# Patient Record
Sex: FEMALE | Race: WHITE | Employment: OTHER | URBAN - METROPOLITAN AREA
[De-identification: names, ages, dates, MRNs, and addresses within clinical notes are randomized per-mention and may not be internally consistent; named-entity substitution may affect disease eponyms.]

---

## 2017-06-28 PROBLEM — R51.9 INTRACTABLE HEADACHE: Status: ACTIVE | Noted: 2017-06-28

## 2020-01-16 ENCOUNTER — APPOINTMENT (OUTPATIENT)
Dept: CT IMAGING | Age: 75
DRG: 386 | End: 2020-01-16
Payer: MEDICARE

## 2020-01-16 ENCOUNTER — HOSPITAL ENCOUNTER (INPATIENT)
Age: 75
LOS: 9 days | Discharge: HOME | DRG: 386 | End: 2020-01-25
Attending: EMERGENCY MEDICINE | Admitting: INTERNAL MEDICINE
Payer: MEDICARE

## 2020-01-16 ENCOUNTER — APPOINTMENT (OUTPATIENT)
Dept: GENERAL RADIOLOGY | Age: 75
DRG: 386 | End: 2020-01-16
Payer: MEDICARE

## 2020-01-16 PROBLEM — R41.82 CHANGE IN MENTAL STATUS: Status: ACTIVE | Noted: 2020-01-16

## 2020-01-16 LAB
A/G RATIO: 0.7 (ref 1.1–2.2)
ALBUMIN SERPL-MCNC: 3.6 G/DL (ref 3.4–5)
ALP BLD-CCNC: 124 U/L (ref 40–129)
ALT SERPL-CCNC: 10 U/L (ref 10–40)
ANION GAP SERPL CALCULATED.3IONS-SCNC: 12 MMOL/L (ref 3–16)
ANISOCYTOSIS: ABNORMAL
AST SERPL-CCNC: 15 U/L (ref 15–37)
BASOPHILS ABSOLUTE: 0 K/UL (ref 0–0.2)
BASOPHILS RELATIVE PERCENT: 0.4 %
BILIRUB SERPL-MCNC: 0.3 MG/DL (ref 0–1)
BILIRUBIN URINE: NEGATIVE
BLOOD, URINE: NEGATIVE
BUN BLDV-MCNC: 17 MG/DL (ref 7–20)
CALCIUM SERPL-MCNC: 9.3 MG/DL (ref 8.3–10.6)
CHLORIDE BLD-SCNC: 101 MMOL/L (ref 99–110)
CLARITY: ABNORMAL
CO2: 22 MMOL/L (ref 21–32)
COLOR: YELLOW
COMMENT UA: ABNORMAL
CREAT SERPL-MCNC: 0.8 MG/DL (ref 0.6–1.2)
EKG ATRIAL RATE: 92 BPM
EKG DIAGNOSIS: NORMAL
EKG P AXIS: 31 DEGREES
EKG P-R INTERVAL: 146 MS
EKG Q-T INTERVAL: 344 MS
EKG QRS DURATION: 80 MS
EKG QTC CALCULATION (BAZETT): 425 MS
EKG R AXIS: 19 DEGREES
EKG T AXIS: 4 DEGREES
EKG VENTRICULAR RATE: 92 BPM
EOSINOPHILS ABSOLUTE: 0.1 K/UL (ref 0–0.6)
EOSINOPHILS RELATIVE PERCENT: 0.9 %
EPITHELIAL CELLS, UA: 4 /HPF (ref 0–5)
GFR AFRICAN AMERICAN: >60
GFR NON-AFRICAN AMERICAN: >60
GLOBULIN: 4.9 G/DL
GLUCOSE BLD-MCNC: 93 MG/DL (ref 70–99)
GLUCOSE URINE: NEGATIVE MG/DL
HCT VFR BLD CALC: 33.3 % (ref 36–48)
HEMATOLOGY PATH CONSULT: YES
HEMOGLOBIN: 10.4 G/DL (ref 12–16)
HYALINE CASTS: 11 /LPF (ref 0–8)
HYPOCHROMIA: ABNORMAL
KETONES, URINE: NEGATIVE MG/DL
LACTIC ACID: 1.4 MMOL/L (ref 0.4–2)
LEUKOCYTE ESTERASE, URINE: ABNORMAL
LYMPHOCYTES ABSOLUTE: 1.2 K/UL (ref 1–5.1)
LYMPHOCYTES RELATIVE PERCENT: 10.9 %
MCH RBC QN AUTO: 21.3 PG (ref 26–34)
MCHC RBC AUTO-ENTMCNC: 31.2 G/DL (ref 31–36)
MCV RBC AUTO: 68.2 FL (ref 80–100)
MICROCYTES: ABNORMAL
MICROSCOPIC EXAMINATION: YES
MONOCYTES ABSOLUTE: 0.7 K/UL (ref 0–1.3)
MONOCYTES RELATIVE PERCENT: 6.8 %
MUCUS: ABNORMAL /LPF
NEUTROPHILS ABSOLUTE: 8.6 K/UL (ref 1.7–7.7)
NEUTROPHILS RELATIVE PERCENT: 81 %
NITRITE, URINE: NEGATIVE
OCCULT BLOOD DIAGNOSTIC: ABNORMAL
OVALOCYTES: ABNORMAL
PDW BLD-RTO: 25.7 % (ref 12.4–15.4)
PH UA: 7 (ref 5–8)
PLATELET # BLD: 376 K/UL (ref 135–450)
PMV BLD AUTO: 8.1 FL (ref 5–10.5)
POTASSIUM REFLEX MAGNESIUM: 4.1 MMOL/L (ref 3.5–5.1)
PRO-BNP: 268 PG/ML (ref 0–124)
PROTEIN UA: ABNORMAL MG/DL
RBC # BLD: 4.88 M/UL (ref 4–5.2)
RBC UA: 3 /HPF (ref 0–4)
RENAL EPITHELIAL, UA: ABNORMAL /HPF
SCHISTOCYTES: ABNORMAL
SODIUM BLD-SCNC: 135 MMOL/L (ref 136–145)
SPECIFIC GRAVITY UA: 1.02 (ref 1–1.03)
TOTAL PROTEIN: 8.5 G/DL (ref 6.4–8.2)
TROPONIN: <0.01 NG/ML
URINE REFLEX TO CULTURE: YES
URINE TYPE: ABNORMAL
UROBILINOGEN, URINE: 0.2 E.U./DL
WBC # BLD: 10.7 K/UL (ref 4–11)
WBC UA: 6 /HPF (ref 0–5)

## 2020-01-16 PROCEDURE — 93005 ELECTROCARDIOGRAM TRACING: CPT | Performed by: EMERGENCY MEDICINE

## 2020-01-16 PROCEDURE — 85025 COMPLETE CBC W/AUTO DIFF WBC: CPT

## 2020-01-16 PROCEDURE — 6370000000 HC RX 637 (ALT 250 FOR IP): Performed by: PHYSICIAN ASSISTANT

## 2020-01-16 PROCEDURE — 80053 COMPREHEN METABOLIC PANEL: CPT

## 2020-01-16 PROCEDURE — 81001 URINALYSIS AUTO W/SCOPE: CPT

## 2020-01-16 PROCEDURE — 6360000002 HC RX W HCPCS: Performed by: HOSPITALIST

## 2020-01-16 PROCEDURE — 87086 URINE CULTURE/COLONY COUNT: CPT

## 2020-01-16 PROCEDURE — 6370000000 HC RX 637 (ALT 250 FOR IP): Performed by: HOSPITALIST

## 2020-01-16 PROCEDURE — G0328 FECAL BLOOD SCRN IMMUNOASSAY: HCPCS

## 2020-01-16 PROCEDURE — 2580000003 HC RX 258: Performed by: HOSPITALIST

## 2020-01-16 PROCEDURE — 72125 CT NECK SPINE W/O DYE: CPT

## 2020-01-16 PROCEDURE — 1200000000 HC SEMI PRIVATE

## 2020-01-16 PROCEDURE — 83880 ASSAY OF NATRIURETIC PEPTIDE: CPT

## 2020-01-16 PROCEDURE — 93010 ELECTROCARDIOGRAM REPORT: CPT | Performed by: INTERNAL MEDICINE

## 2020-01-16 PROCEDURE — 70450 CT HEAD/BRAIN W/O DYE: CPT

## 2020-01-16 PROCEDURE — 84484 ASSAY OF TROPONIN QUANT: CPT

## 2020-01-16 PROCEDURE — 83605 ASSAY OF LACTIC ACID: CPT

## 2020-01-16 PROCEDURE — 99285 EMERGENCY DEPT VISIT HI MDM: CPT

## 2020-01-16 PROCEDURE — 71046 X-RAY EXAM CHEST 2 VIEWS: CPT

## 2020-01-16 RX ORDER — ACETAMINOPHEN 325 MG/1
650 TABLET ORAL ONCE
Status: COMPLETED | OUTPATIENT
Start: 2020-01-16 | End: 2020-01-16

## 2020-01-16 RX ORDER — POTASSIUM CHLORIDE 20 MEQ/1
40 TABLET, EXTENDED RELEASE ORAL PRN
Status: DISCONTINUED | OUTPATIENT
Start: 2020-01-16 | End: 2020-01-25 | Stop reason: HOSPADM

## 2020-01-16 RX ORDER — SODIUM CHLORIDE 0.9 % (FLUSH) 0.9 %
10 SYRINGE (ML) INJECTION PRN
Status: DISCONTINUED | OUTPATIENT
Start: 2020-01-16 | End: 2020-01-19

## 2020-01-16 RX ORDER — METOPROLOL SUCCINATE 25 MG/1
25 TABLET, EXTENDED RELEASE ORAL DAILY
Status: DISCONTINUED | OUTPATIENT
Start: 2020-01-16 | End: 2020-01-25 | Stop reason: HOSPADM

## 2020-01-16 RX ORDER — ASPIRIN 81 MG/1
81 TABLET, CHEWABLE ORAL DAILY
Status: DISCONTINUED | OUTPATIENT
Start: 2020-01-17 | End: 2020-01-17

## 2020-01-16 RX ORDER — SODIUM CHLORIDE 0.9 % (FLUSH) 0.9 %
10 SYRINGE (ML) INJECTION EVERY 12 HOURS SCHEDULED
Status: DISCONTINUED | OUTPATIENT
Start: 2020-01-16 | End: 2020-01-19

## 2020-01-16 RX ORDER — CLOPIDOGREL BISULFATE 75 MG/1
75 TABLET ORAL DAILY
Status: DISCONTINUED | OUTPATIENT
Start: 2020-01-17 | End: 2020-01-18

## 2020-01-16 RX ORDER — SODIUM CHLORIDE 9 MG/ML
INJECTION, SOLUTION INTRAVENOUS CONTINUOUS
Status: DISCONTINUED | OUTPATIENT
Start: 2020-01-16 | End: 2020-01-23

## 2020-01-16 RX ORDER — FAMOTIDINE 20 MG/1
20 TABLET, FILM COATED ORAL 2 TIMES DAILY
Status: DISCONTINUED | OUTPATIENT
Start: 2020-01-16 | End: 2020-01-17

## 2020-01-16 RX ORDER — LISINOPRIL 20 MG/1
20 TABLET ORAL DAILY
Status: DISCONTINUED | OUTPATIENT
Start: 2020-01-16 | End: 2020-01-25 | Stop reason: HOSPADM

## 2020-01-16 RX ORDER — ONDANSETRON 2 MG/ML
4 INJECTION INTRAMUSCULAR; INTRAVENOUS EVERY 6 HOURS PRN
Status: DISCONTINUED | OUTPATIENT
Start: 2020-01-16 | End: 2020-01-25 | Stop reason: HOSPADM

## 2020-01-16 RX ORDER — POTASSIUM CHLORIDE 7.45 MG/ML
10 INJECTION INTRAVENOUS PRN
Status: DISCONTINUED | OUTPATIENT
Start: 2020-01-16 | End: 2020-01-25 | Stop reason: HOSPADM

## 2020-01-16 RX ORDER — ATORVASTATIN CALCIUM 40 MG/1
40 TABLET, FILM COATED ORAL NIGHTLY
Status: DISCONTINUED | OUTPATIENT
Start: 2020-01-16 | End: 2020-01-25 | Stop reason: HOSPADM

## 2020-01-16 RX ADMIN — METOPROLOL SUCCINATE 25 MG: 25 TABLET, EXTENDED RELEASE ORAL at 20:42

## 2020-01-16 RX ADMIN — Medication 10 ML: at 20:42

## 2020-01-16 RX ADMIN — ATORVASTATIN CALCIUM 40 MG: 40 TABLET, FILM COATED ORAL at 20:42

## 2020-01-16 RX ADMIN — LISINOPRIL 20 MG: 20 TABLET ORAL at 20:42

## 2020-01-16 RX ADMIN — SODIUM CHLORIDE: 9 INJECTION, SOLUTION INTRAVENOUS at 20:42

## 2020-01-16 RX ADMIN — FAMOTIDINE 20 MG: 20 TABLET, FILM COATED ORAL at 20:42

## 2020-01-16 RX ADMIN — ENOXAPARIN SODIUM 40 MG: 40 INJECTION SUBCUTANEOUS at 20:42

## 2020-01-16 RX ADMIN — ACETAMINOPHEN 650 MG: 325 TABLET ORAL at 15:29

## 2020-01-16 ASSESSMENT — ENCOUNTER SYMPTOMS
EYE DISCHARGE: 0
SORE THROAT: 0
SHORTNESS OF BREATH: 0
BACK PAIN: 0
RHINORRHEA: 0
WHEEZING: 0
NAUSEA: 1
COUGH: 0
DIARRHEA: 0
VOMITING: 0
EYE PAIN: 0
ABDOMINAL PAIN: 1

## 2020-01-16 ASSESSMENT — PAIN DESCRIPTION - FREQUENCY: FREQUENCY: INTERMITTENT

## 2020-01-16 ASSESSMENT — PAIN SCALES - GENERAL
PAINLEVEL_OUTOF10: 1
PAINLEVEL_OUTOF10: 1
PAINLEVEL_OUTOF10: 0
PAINLEVEL_OUTOF10: 0
PAINLEVEL_OUTOF10: 4
PAINLEVEL_OUTOF10: 0

## 2020-01-16 ASSESSMENT — PAIN DESCRIPTION - ORIENTATION: ORIENTATION: ANTERIOR

## 2020-01-16 ASSESSMENT — PAIN DESCRIPTION - DESCRIPTORS: DESCRIPTORS: ACHING

## 2020-01-16 ASSESSMENT — PAIN - FUNCTIONAL ASSESSMENT: PAIN_FUNCTIONAL_ASSESSMENT: ACTIVITIES ARE NOT PREVENTED

## 2020-01-16 ASSESSMENT — PAIN DESCRIPTION - ONSET: ONSET: ON-GOING

## 2020-01-16 ASSESSMENT — PAIN DESCRIPTION - LOCATION: LOCATION: HEAD

## 2020-01-16 ASSESSMENT — PAIN DESCRIPTION - PROGRESSION: CLINICAL_PROGRESSION: NOT CHANGED

## 2020-01-16 ASSESSMENT — PAIN DESCRIPTION - PAIN TYPE: TYPE: ACUTE PAIN

## 2020-01-16 NOTE — ED PROVIDER NOTES
[address]       Pt Name: Lars Newsome  MRN: [id number]  [place] 1945  Date of evaluation: 1/16/2020  Provider: SONIA Hawkins    This patient was seen and evaluated by the attending physician Fred Cnoti MD.    [address] [place] [address]       Chief Complaint   Patient presents with   Polo Kimts     lives with son. son found her on ground in bathroom. pt unsure of how she fell. denies injury/pain. . a/ox4       HISTORY OF PRESENT ILLNESS  (Location/Symptom, Timing/Onset, Context/Setting, Quality, Duration, Modifying Factors, Severity.)   Lars Newsome is a 69 y.o. female who presents to the emergency department following a fall in her home. Patient was found by her son in the bathroom on the floor, and he called EMS and she was brought to the ED. Patient's son is not present for this HPI. Patient says she does not remember falling, only remembers being found on the floor, and does not remember what happened prior to falling. Says that lately she has been having episodes of dizziness that caused her to fall down, but she denies dizziness presently. Says she has been having some abdominal pain recently, reports a recent colonoscopy but she cannot remember anything else about that. She denies any pain presently. Denies shortness of breath, cough or cold symptoms. No other complaints. Nursing Notes were reviewed and I agree. REVIEW OF SYSTEMS    (2-9 systems for level 4, 10 or more for level 5)     Constitutional:  Negative for fever, chills, appetite change, fatigue and unexpected weight change. HENT:  Negative for congestion, ear pain, facial swelling, rhinorrhea, sinus pressure, sneezing, sore throat and trouble swallowing. Eyes:  Negative for photophobia, pain and visual disturbance. Respiratory:  Negative for cough, shortness of breath, wheezing and stridor. Cardiovascular:  Negative for chest pain, palpitations and leg swelling. Gastrointestinal: Positive for frequent abdominal pain. Negative for nausea, vomiting, diarrhea, constipation and blood in stool. Genitourinary:  Negative for dysuria, urgency, hematuria, flank pain, vaginal bleeding, vaginal discharge and pelvic pain. Musculoskeletal:  Negative for myalgias, arthralgias, neck pain and neck stiffness. Neurological:  Negative for dizziness, seizures, syncope, speech difficulty, weakness, light-headedness, numbness and headaches. Psychiatric/Behavioral:  Negative for suicidal ideas, hallucinations, confusion, sleep disturbance and agitation. Except as noted above the remainder of the review of systems was reviewed and negative. PAST MEDICAL HISTORY         Diagnosis Date    Acute MI [facility])     Eczema     [place] (hard of hearing)     Hypercholesteremia     Hypertension     Pacemaker     Ulcerative colitis     \"resolved\" per pt    Wears hearing aid        SURGICAL HISTORY           Procedure Laterality Date    ANKLE FRACTURE SURGERY  11    ORIF left ankle fracture    CORONARY ANGIOPLASTY WITH STENT PLACEMENT      x5    HYSTERECTOMY      dub    PACEMAKER INSERTION      RHINOPLASTY         CURRENT MEDICATIONS       Previous Medications    ASPIRIN 81 MG TABLET    Take 81 mg by mouth daily    ATORVASTATIN (LIPITOR) 40 MG TABLET    Take 40 mg by mouth daily. CLOPIDOGREL (PLAVIX) 75 MG TABLET    Take 75 mg by mouth daily. LISINOPRIL (PRINIVIL;ZESTRIL) 20 MG TABLET    Take 20 mg by mouth daily. METOPROLOL (TOPROL-XL) 25 MG XL TABLET    Take 25 mg by mouth daily.        ALLERGIES     Cortisone and Percocet [oxycodone-acetaminophen]    FAMILY HISTORY           Problem Relation Age of Onset    Heart Disease Mother     Ulcerative Colitis Father     Ulcerative Colitis Son      Family Status   Relation Name Status    Mother   at age 80    Father   at age 80    Son  (Not Specified)        SOCIAL HISTORY      reports that she has never smoked. She has never used smokeless tobacco. She reports that she does not drink alcohol or use drugs. PHYSICAL EXAM    (up to 7 for level 4, 8 or more for level 5)     ED Triage Vitals   BP Temp Temp Source Pulse Resp SpO2 Height Weight   01/16/20 1333 01/16/20 1333 01/16/20 1333 01/16/20 1333 01/16/20 1333 01/16/20 1333 01/16/20 1346 01/16/20 1333   (!) 177/98 97.9 °F (36.6 °C) Oral 94 20 99 % 4' 10\" (1.473 m) 148 lb 13 oz (67.5 kg)       Constitutional:  Appearing well-developed and well-nourished. No distress. HENT:  Normocephalic and atraumatic. Conjunctivae and EOM are normal. Pupils are equal, round, and reactive to light. Neck:  Normal range of motion. Neck supple. No tracheal deviation present. No thyromegaly present. No cervical adenopathy. Cardiovascular:  Normal rate, regular rhythm, normal heart sounds and intact distal pulses. Pulmonary/Chest:  Effort normal and breath sounds normal. No respiratory distress. No wheezes or rales. Abdominal:  Soft. Bowel sounds are normal. No distension, mass, tenderness, rebound or guarding. Musculoskeletal:  Normal range of motion. No edema exhibited. Neurological:  Alert and oriented to person, place, and time. No cranial nerve deficit. Skin:  Skin is warm and dry. Not diaphoretic. Psychiatric: Mildly confused. Normal mood, affect, behavior. DIAGNOSTIC RESULTS     RADIOLOGY:     Interpretation per the Radiologist below, if available at the time of this note:    CT Head WO Contrast   Preliminary Result   1. No acute intracranial hemorrhage   2. Focal area of encephalomalacia has developed involving the right   parietal-occipital region consistent with remote infarct. This is new from   prior CT of February 2018. 3. Stable chronic ischemic changes involving the periventricular white matter   and the right basal ganglia.          CT Cervical Spine WO Contrast   Preliminary Result   No radiographic findings to suggest presence of Narrative:     ORDER#: [id number]                          ORDERED BY: Shayla Loyola  SOURCE: Stool                              COLLECTED:  01/16/20 14:55  ANTIBIOTICS AT [facility].:                      RECEIVED :  01/16/20 14:57  Performed at:  [address] [place] [address]  [address] [facility] [employer] [address]   Phone [phone]   MICROSCOPIC URINALYSIS - Abnormal; Notable for the following components:    Mucus, UA 1+ (*)     Renal Epithelial, Urine 0-2 (*)     Hyaline Casts, UA 11 (*)     WBC, UA 6 (*)     All other components within normal limits    Narrative:     Performed at:  [facility]  [address] [facility] [employer] [address]   Phone [phone]   URINE CULTURE   TROPONIN    Narrative:     Performed at:  [facility]  [address] [facility] [employer] [address]   Phone [phone]   LACTIC ACID, PLASMA    Narrative:     Performed at:  [facility]  [address] [facility] [employer] [address]   Phone [phone]       All other labs were within normal range or not returned as of this dictation. EMERGENCY DEPARTMENT COURSE and DIFFERENTIAL DIAGNOSIS/MDM:   Vitals:    Vitals:    01/16/20 1447 01/16/20 1501 01/16/20 1517 01/16/20 1532   BP: (!) 167/77 (!) 187/97 (!) 192/91 (!) 164/95   Pulse: 90 87 87 92   Resp: 19 18 19 18   Temp:       TempSrc:       SpO2: 98% 98% 100% 96%   Weight:       Height:           The patient's condition in the ED was stable, the patient was afebrile and nontoxic in appearance, and the patient's physical exam was unremarkable. She did appear somewhat confused in the ED, and she could not recollect the circumstances of her fall. I do not know the patient's baseline mental status, and there is a possibility that she is altered right now. CT scan of the head without contrast showed some suspicion for a remote infarct since prior imaging about 2 years ago.

## 2020-01-16 NOTE — ED PROVIDER NOTES
[address] [place] [address]  eMERGENCY dEPARTMENT eNCOUnter        Pt Name: Boris Catalan  MRN: [id number]  [place] 1945  Date of evaluation: 1/16/2020  Provider: Colonel Mcburney, MD  PCP: [facility]       Chief Complaint   Patient presents with   Alejandrina Moles     lives with son. son found her on ground in bathroom. pt unsure of how she fell. denies injury/pain. . a/ox4       HISTORY OFPRESENT ILLNESS   (Location/Symptom, Timing/Onset, Context/Setting, Quality, Duration, Modifying Factors,Severity)  Note limiting factors. Boris Catalan is a 69 y.o. female     Brought in by ambulance for what appears to be a syncopal event. Patient has no recollection but she was apparently found on the bathroom floor. The only specific complaint is that she had some abdominal pain earlier and has been nauseated. She does state that last week she got lightheaded getting out of the car and struck her head on a tree. No chest pain no trouble breathing and she is not been running any fever. She seems mildly confused but she knows where she is, her name, the year and why she is here. Nursing Noteswere all reviewed and agreed with or any disagreements were addressed  in the HPI. REVIEW OF SYSTEMS    (2-9 systems for level 4, 10 or more for level 5)     Review of Systems   Constitutional: Negative for chills, fatigue and fever. HENT: Negative for ear pain, rhinorrhea and sore throat. Eyes: Negative for pain, discharge and visual disturbance. Respiratory: Negative for cough, shortness of breath and wheezing. Cardiovascular: Negative for chest pain, palpitations and leg swelling. Gastrointestinal: Positive for abdominal pain and nausea. Negative for diarrhea and vomiting. Genitourinary: Negative for difficulty urinating, dysuria, pelvic pain and vaginal discharge. Musculoskeletal: Negative for arthralgias, back pain, joint swelling and neck pain. Skin: Negative for rash. Transportation needs:     Medical: None     Non-medical: None   Tobacco Use    Smoking status: Never Smoker    Smokeless tobacco: Never Used   Substance and Sexual Activity    Alcohol use: No     Alcohol/week: 0.0 standard drinks    Drug use: No    Sexual activity: Not Currently   Lifestyle    Physical activity:     Days per week: None     Minutes per session: None    Stress: None   Relationships    Social connections:     Talks on phone: None     Gets together: None     Attends Hoahaoism service: None     Active member of club or organization: None     Attends meetings of clubs or organizations: None     Relationship status: None    Intimate partner violence:     Fear of current or ex partner: None     Emotionally abused: None     Physically abused: None     Forced sexual activity: None   Other Topics Concern    None   Social History Narrative    None       SCREENINGS    [place] Coma Scale  Eye Opening: Spontaneous  Best Verbal Response: Oriented  Best Motor Response: Obeys commands  [place]ao Coma Scale Score: 15        PHYSICAL EXAM    (up to 7 for level 4, 8 or more for level 5)     ED Triage Vitals   BP Temp Temp Source Pulse Resp SpO2 Height Weight   01/16/20 1333 01/16/20 1333 01/16/20 1333 01/16/20 1333 01/16/20 1333 01/16/20 1333 01/16/20 1346 01/16/20 1333   (!) 177/98 97.9 °F (36.6 °C) Oral 94 20 99 % 4' 10\" (1.473 m) 148 lb 13 oz (67.5 kg)      height is 4' 10\" (1.473 m) and weight is 144 lb 6.4 oz (65.5 kg). Her oral temperature is 97.5 °F (36.4 °C). Her blood pressure is 133/81 and her pulse is 70. Her respiration is 18 and oxygen saturation is 97%. Physical Exam  Exam conducted with a chaperone present. Constitutional:       Appearance: She is well-developed. She is not diaphoretic. HENT:      Head: Normocephalic and atraumatic. Right Ear: External ear normal.      Left Ear: External ear normal.   Eyes:      General: No scleral icterus. Right eye: No discharge. Left eye: No discharge. Neck:      Musculoskeletal: Normal range of motion. Thyroid: No thyromegaly. Vascular: No JVD. Trachea: No tracheal deviation. Cardiovascular:      Rate and Rhythm: Normal rate and regular rhythm. Heart sounds: No murmur. No friction rub. No gallop. Pulmonary:      Effort: Pulmonary effort is normal. No respiratory distress. Breath sounds: Normal breath sounds. No stridor. No wheezing or rales. Abdominal:      General: There is no distension. Palpations: Abdomen is soft. Tenderness: There is no tenderness. There is no guarding or rebound. Genitourinary:     Rectum: Tenderness present. Comments: Exam was tender. Stool was brown but there was a hint of bright red blood  Musculoskeletal:         General: No tenderness. Skin:     General: Skin is warm and dry. Findings: No rash (On exposed body surfaces). Neurological:      Mental Status: She is alert and oriented to person, place, and time. Coordination: Coordination normal.   Psychiatric:         Behavior: Behavior normal.         Thought Content:  Thought content normal.         DIAGNOSTIC RESULTS   LABS:    Results for orders placed or performed during the hospital encounter of 01/16/20   CBC Auto Differential   Result Value Ref Range    WBC 10.7 4.0 - 11.0 K/uL    RBC 4.88 4.00 - 5.20 M/uL    Hemoglobin 10.4 (L) 12.0 - 16.0 g/dL    Hematocrit 33.3 (L) 36.0 - 48.0 %    MCV 68.2 (L) 80.0 - 100.0 fL    MCH 21.3 (L) 26.0 - 34.0 pg    MCHC 31.2 31.0 - 36.0 g/dL    RDW 25.7 (H) 12.4 - 15.4 %    Platelets 464 393 - 849 K/uL    MPV 8.1 5.0 - 10.5 fL    Path Consult Yes     Neutrophils % 81.0 %    Lymphocytes % 10.9 %    Monocytes % 6.8 %    Eosinophils % 0.9 %    Basophils % 0.4 %    Neutrophils Absolute 8.6 (H) 1.7 - 7.7 K/uL    Lymphocytes Absolute 1.2 1.0 - 5.1 K/uL    Monocytes Absolute 0.7 0.0 - 1.3 K/uL    Eosinophils Absolute 0.1 0.0 - 0.6 K/uL    Basophils Absolute 0.0 0.0 - - 8 /LPF    WBC, UA 6 (H) 0 - 5 /HPF    RBC, UA 3 0 - 4 /HPF    Epi Cells 4 0 - 5 /HPF   Basic Metabolic Panel w/ Reflex to MG   Result Value Ref Range    Sodium 137 136 - 145 mmol/L    Potassium reflex Magnesium 3.7 3.5 - 5.1 mmol/L    Chloride 107 99 - 110 mmol/L    CO2 20 (L) 21 - 32 mmol/L    Anion Gap 10 3 - 16    Glucose 97 70 - 99 mg/dL    BUN 19 7 - 20 mg/dL    CREATININE 0.7 0.6 - 1.2 mg/dL    GFR Non-African American >60 >60    GFR African American >60 >60    Calcium 8.1 (L) 8.3 - 10.6 mg/dL   CBC   Result Value Ref Range    WBC 9.4 4.0 - 11.0 K/uL    RBC 3.80 (L) 4.00 - 5.20 M/uL    Hemoglobin 8.0 (L) 12.0 - 16.0 g/dL    Hematocrit 26.0 (L) 36.0 - 48.0 %    MCV 68.6 (L) 80.0 - 100.0 fL    MCH 21.1 (L) 26.0 - 34.0 pg    MCHC 30.7 (L) 31.0 - 36.0 g/dL    RDW 25.5 (H) 12.4 - 15.4 %    Platelets 349 726 - 098 K/uL    MPV 6.4 5.0 - 10.5 fL    Path Consult No    EKG 12 Lead   Result Value Ref Range    Ventricular Rate 92 BPM    Atrial Rate 92 BPM    P-R Interval 146 ms    QRS Duration 80 ms    Q-T Interval 344 ms    QTc Calculation (Bazett) 425 ms    P Axis 31 degrees    R Axis 19 degrees    T Axis 4 degrees    Diagnosis       Normal sinus rhythmMinimal voltage criteria for LVH, may be normal variantInferior infarct (cited on or before 02-NOV-2010)Cannot rule out Anterior infarct , age undeterminedAbnormal ECGWhen compared with ECG of 03-FEB-2018 23:52,Questionable change in initial forces of Anterior leadsConfirmed by Jacky Coy MD, Mercedes Sauer ([id number]) on 1/16/2020 4:07:02 PM       All other labs were within normal range or not returned as of this dictation. EKG: All EKG's are interpreted by the Emergency Department Physician who either signs orCo-signs this chart in the absence of a cardiologist.    EKG visualized preliminarily interpreted by myself shows sinus rhythm. Borderline left ventricular approach if he.  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 The axis is 19. Nonspecific T wave flattening.   No acute injury pattern appreciated    RADIOLOGY:   Non-plain film images such as CT, Ultrasound and MRI are read by the radiologist. [facility] radiographic images are visualized and preliminarily interpreted by the  EDProvider with the below findings:    Xr Chest Standard (2 Vw)    Result Date: 1/16/2020  EXAMINATION: TWO XRAY VIEWS OF THE CHEST 1/16/2020 2:02 pm COMPARISON: Chest x-ray dated 02/03/2018. HISTORY: ORDERING SYSTEM PROVIDED HISTORY: syncope TECHNOLOGIST PROVIDED HISTORY: Reason for exam:->syncope Reason for Exam: syncope Acuity: Acute Type of Exam: Initial FINDINGS: LINES/TUBES/OTHER: Left subclavian dual lead pacemaker is visualized with leads terminating in the right atrium and right ventricle. HEART/MEDIASTINUM: The cardiomediastinal silhouette is within normal limits. PLEURA/LUNGS: There are no focal consolidations or pleural effusions. There is no appreciable pneumothorax. BONES/SOFT TISSUE: No acute abnormality. No acute pulmonary disease. Ct Head Wo Contrast    Result Date: 1/16/2020  EXAMINATION: CT OF THE HEAD WITHOUT CONTRAST  1/16/2020 2:14 pm TECHNIQUE: CT of the head was performed without the administration of intravenous contrast. Dose modulation, iterative reconstruction, and/or weight based adjustment of the mA/kV was utilized to reduce the radiation dose to as low as reasonably achievable. COMPARISON: February 3, 2018 HISTORY: ORDERING SYSTEM PROVIDED HISTORY: fell and hit head last week, syncope today TECHNOLOGIST PROVIDED HISTORY: Reason for exam:->fell and hit head last week, syncope today Has a \"code stroke\" or \"stroke alert\" been called? ->No Reason for Exam: Fall (lives with son. son found her on ground in bathroom. pt unsure of how she fell. denies injury/pain. . a/ox4) Acuity: Acute Type of Exam: Initial FINDINGS: BRAIN/VENTRICLES: There is no acute intracranial hemorrhage, mass effect or midline shift. No abnormal extra-axial fluid collection.   The gray-white differentiation mg Oral Given 1/17/20 0904)   lisinopril (PRINIVIL;ZESTRIL) tablet 20 mg (20 mg Oral Given 1/17/20 0904)   metoprolol succinate (TOPROL XL) extended release tablet 25 mg (25 mg Oral Given 1/17/20 0904)   0.9 % sodium chloride infusion ( Intravenous New Bag 1/17/20 0630)   sodium chloride flush 0.9 % injection 10 mL (10 mLs Intravenous Not Given 1/17/20 0904)   sodium chloride flush 0.9 % injection 10 mL (has no administration in time range)   potassium chloride (KLOR-CON M) extended release tablet 40 mEq (has no administration in time range)     Or   potassium bicarb-citric acid (EFFER-K) effervescent tablet 40 mEq (has no administration in time range)     Or   potassium chloride 10 mEq/100 mL IVPB (Peripheral Line) (has no administration in time range)   magnesium hydroxide (MILK OF MAGNESIA) 400 MG/5ML suspension 30 mL (has no administration in time range)   ondansetron (ZOFRAN) injection 4 mg (has no administration in time range)   famotidine (PEPCID) tablet 20 mg (20 mg Oral Given 1/17/20 0904)   enoxaparin (LOVENOX) injection 40 mg (40 mg Subcutaneous Given 1/16/20 2042)   acetaminophen (TYLENOL) tablet 650 mg (650 mg Oral Given 1/16/20 1529)       Stable. Admitted. FINAL IMPRESSION      1. Fall in home, initial encounter    2. Blood in stool    3.  History of ulcerative colitis          DISPOSITION/PLAN    DISPOSITION Admitted 01/16/2020 04:42:55 PM      PATIENT REFERRED TO:  [address] [place] [address]  [address] [place] [address]  [phone]            DISCHARGE MEDICATIONS:  Current Discharge Medication List          DISCONTINUED MEDICATIONS:  Current Discharge Medication List      STOP taking these medications       ibuprofen (ADVIL;MOTRIN) 200 MG tablet Comments:   Reason for Stopping:         fexofenadine (ALLEGRA) 180 MG tablet Comments:   Reason for Stopping:                      (Please note that portions of this note were completed with a voice recognition program.  Efforts were

## 2020-01-16 NOTE — ED NOTES
ED SBAR report provider to Lisbet Traore, [address] [facility]. Patient to be transported to Room 4120 via stretcher by transport tech. Patient transported with bedside cardiac monitor and with IV medications infusing. IV site clean, dry, and intact. MEWS score and pain assessed and documented. Updated patient on plan of care.      Jina Larson RN  01/16/20 [id number]

## 2020-01-16 NOTE — ED TRIAGE NOTES
Pt to ED d/t fall. Pt lives with her son. Son found her on ground in bathroom. Pt unsure of how she fell. Denies injury/pain. .

## 2020-01-17 LAB
ANION GAP SERPL CALCULATED.3IONS-SCNC: 10 MMOL/L (ref 3–16)
BUN BLDV-MCNC: 19 MG/DL (ref 7–20)
CALCIUM SERPL-MCNC: 8.1 MG/DL (ref 8.3–10.6)
CHLORIDE BLD-SCNC: 107 MMOL/L (ref 99–110)
CO2: 20 MMOL/L (ref 21–32)
CREAT SERPL-MCNC: 0.7 MG/DL (ref 0.6–1.2)
DAT IGG CAPTURE: NORMAL
FERRITIN: 20.9 NG/ML (ref 15–150)
GFR AFRICAN AMERICAN: >60
GFR NON-AFRICAN AMERICAN: >60
GLUCOSE BLD-MCNC: 97 MG/DL (ref 70–99)
HCT VFR BLD CALC: 26 % (ref 36–48)
HCT VFR BLD CALC: 28.2 % (ref 36–48)
HEMATOLOGY PATH CONSULT: NO
HEMATOLOGY PATH CONSULT: NORMAL
HEMOGLOBIN: 8 G/DL (ref 12–16)
HEMOGLOBIN: 8.8 G/DL (ref 12–16)
IRON SATURATION: 5 % (ref 15–50)
IRON: 18 UG/DL (ref 37–145)
LV EF: 55 %
LVEF MODALITY: NORMAL
MCH RBC QN AUTO: 21.1 PG (ref 26–34)
MCHC RBC AUTO-ENTMCNC: 30.7 G/DL (ref 31–36)
MCV RBC AUTO: 68.6 FL (ref 80–100)
PDW BLD-RTO: 25.5 % (ref 12.4–15.4)
PLATELET # BLD: 330 K/UL (ref 135–450)
PMV BLD AUTO: 6.4 FL (ref 5–10.5)
POTASSIUM REFLEX MAGNESIUM: 3.7 MMOL/L (ref 3.5–5.1)
RBC # BLD: 3.8 M/UL (ref 4–5.2)
SODIUM BLD-SCNC: 137 MMOL/L (ref 136–145)
TOTAL IRON BINDING CAPACITY: 334 UG/DL (ref 260–445)
URINE CULTURE, ROUTINE: NORMAL
WBC # BLD: 9.4 K/UL (ref 4–11)

## 2020-01-17 PROCEDURE — 82728 ASSAY OF FERRITIN: CPT

## 2020-01-17 PROCEDURE — 86900 BLOOD TYPING SEROLOGIC ABO: CPT

## 2020-01-17 PROCEDURE — 93306 TTE W/DOPPLER COMPLETE: CPT

## 2020-01-17 PROCEDURE — 6360000002 HC RX W HCPCS: Performed by: HOSPITALIST

## 2020-01-17 PROCEDURE — 85014 HEMATOCRIT: CPT

## 2020-01-17 PROCEDURE — 95819 EEG AWAKE AND ASLEEP: CPT

## 2020-01-17 PROCEDURE — 86870 RBC ANTIBODY IDENTIFICATION: CPT

## 2020-01-17 PROCEDURE — 86922 COMPATIBILITY TEST ANTIGLOB: CPT

## 2020-01-17 PROCEDURE — 93880 EXTRACRANIAL BILAT STUDY: CPT

## 2020-01-17 PROCEDURE — 85018 HEMOGLOBIN: CPT

## 2020-01-17 PROCEDURE — 2580000003 HC RX 258: Performed by: HOSPITALIST

## 2020-01-17 PROCEDURE — 86850 RBC ANTIBODY SCREEN: CPT

## 2020-01-17 PROCEDURE — 80048 BASIC METABOLIC PNL TOTAL CA: CPT

## 2020-01-17 PROCEDURE — 6370000000 HC RX 637 (ALT 250 FOR IP): Performed by: HOSPITALIST

## 2020-01-17 PROCEDURE — 83540 ASSAY OF IRON: CPT

## 2020-01-17 PROCEDURE — 86860 RBC ANTIBODY ELUTION: CPT

## 2020-01-17 PROCEDURE — C9113 INJ PANTOPRAZOLE SODIUM, VIA: HCPCS | Performed by: HOSPITALIST

## 2020-01-17 PROCEDURE — 36415 COLL VENOUS BLD VENIPUNCTURE: CPT

## 2020-01-17 PROCEDURE — 85027 COMPLETE CBC AUTOMATED: CPT

## 2020-01-17 PROCEDURE — 1200000000 HC SEMI PRIVATE

## 2020-01-17 PROCEDURE — 86901 BLOOD TYPING SEROLOGIC RH(D): CPT

## 2020-01-17 PROCEDURE — 86880 COOMBS TEST DIRECT: CPT

## 2020-01-17 PROCEDURE — 86905 BLOOD TYPING RBC ANTIGENS: CPT

## 2020-01-17 PROCEDURE — 86902 BLOOD TYPE ANTIGEN DONOR EA: CPT

## 2020-01-17 PROCEDURE — 83550 IRON BINDING TEST: CPT

## 2020-01-17 RX ORDER — HALOPERIDOL 5 MG/ML
1 INJECTION INTRAMUSCULAR EVERY 4 HOURS PRN
Status: DISCONTINUED | OUTPATIENT
Start: 2020-01-17 | End: 2020-01-24

## 2020-01-17 RX ORDER — PANTOPRAZOLE SODIUM 40 MG/10ML
40 INJECTION, POWDER, LYOPHILIZED, FOR SOLUTION INTRAVENOUS 2 TIMES DAILY
Status: DISCONTINUED | OUTPATIENT
Start: 2020-01-17 | End: 2020-01-25 | Stop reason: HOSPADM

## 2020-01-17 RX ORDER — LORAZEPAM 2 MG/ML
1 INJECTION INTRAMUSCULAR ONCE
Status: COMPLETED | OUTPATIENT
Start: 2020-01-17 | End: 2020-01-17

## 2020-01-17 RX ADMIN — ONDANSETRON 4 MG: 2 INJECTION INTRAMUSCULAR; INTRAVENOUS at 20:25

## 2020-01-17 RX ADMIN — SODIUM CHLORIDE: 9 INJECTION, SOLUTION INTRAVENOUS at 17:10

## 2020-01-17 RX ADMIN — METOPROLOL SUCCINATE 25 MG: 25 TABLET, EXTENDED RELEASE ORAL at 09:04

## 2020-01-17 RX ADMIN — CLOPIDOGREL BISULFATE 75 MG: 75 TABLET ORAL at 09:04

## 2020-01-17 RX ADMIN — HALOPERIDOL LACTATE 1 MG: 5 INJECTION INTRAMUSCULAR at 17:53

## 2020-01-17 RX ADMIN — HALOPERIDOL LACTATE 1 MG: 5 INJECTION INTRAMUSCULAR at 22:45

## 2020-01-17 RX ADMIN — LISINOPRIL 20 MG: 20 TABLET ORAL at 09:04

## 2020-01-17 RX ADMIN — ASPIRIN 81 MG 81 MG: 81 TABLET ORAL at 09:04

## 2020-01-17 RX ADMIN — FAMOTIDINE 20 MG: 20 TABLET, FILM COATED ORAL at 09:04

## 2020-01-17 RX ADMIN — LORAZEPAM 1 MG: 2 INJECTION INTRAMUSCULAR; INTRAVENOUS at 19:00

## 2020-01-17 RX ADMIN — PANTOPRAZOLE SODIUM 40 MG: 40 INJECTION, POWDER, FOR SOLUTION INTRAVENOUS at 20:25

## 2020-01-17 RX ADMIN — SODIUM CHLORIDE: 9 INJECTION, SOLUTION INTRAVENOUS at 06:30

## 2020-01-17 RX ADMIN — Medication 10 ML: at 20:26

## 2020-01-17 RX ADMIN — PANTOPRAZOLE SODIUM 40 MG: 40 INJECTION, POWDER, FOR SOLUTION INTRAVENOUS at 14:49

## 2020-01-17 ASSESSMENT — PAIN SCALES - GENERAL: PAINLEVEL_OUTOF10: 0
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
## 2020-01-17 NOTE — PROGRESS NOTES
Pt has a pacemaker. Per tele monitor pt is in sinus rhythm with only an occasional atrial spike. Pt denies chest pain but is SOB with exertion.

## 2020-01-17 NOTE — PROGRESS NOTES
Pt admitted to 4120. Pt is A&O x 4, extremely hard of hearing and states that her hearing aides aren't working right now. Pt oriented to room, call light, fall precautions. Orders to be reviewed. Will monitor.    Electronically signed by Dave Onofre RN on 1/16/2020 at 8:34 PM

## 2020-01-17 NOTE — CONSULTS
Neurology Consult Note  Reason for Consult: syncope    Chief complaint: dizzy, fall    Dr Kala Fung MD asked me to see Clau Andrea in consultation for evaluation of syncope    History of Present Illness:  Clau Andrea is a 69 y.o. female who presents after falling in the bathroom. I obtained my information via interview w/ the patient, supplemented by chart review. The patient says that yesterday afternoon she stood up from the tub after showering/bathing, began feeling dizzy, then ended up on the floor. It is not clear to her if she actually lost consciousness or how long she was actually down for. No tongue biting. No incontinence. She was concerned because she could not get herself up, though did not identify any other focal deficit at the time. She did feel confused, however, saying that everything seemed strange. She was able to call for her son, who himself has a hx of stroke, and unfortunately he was unable to help her up either. EMS was called and she was subsequently transported to the ED in order to be evaluated. BP was 177/98. No fever. CT head w/out any definitive acute abnormality, noting a new focal area of encephalomalacia (when compared to scan from 2018) in the right parietal/occipital region. Currently, she says she has been feeling dizzy intermittently today, particularly when upright and moving, otherwise feels OK.       Medical History:  Past Medical History:   Diagnosis Date    Acute MI ([address] 2003    Eczema     [place] (hard of hearing)     Hypercholesteremia     Hypertension     Pacemaker     Ulcerative colitis 2003    \"resolved\" per pt    Wears hearing aid      Past Surgical History:   Procedure Laterality Date    ANKLE FRACTURE SURGERY  7/26/11    ORIF left ankle fracture    CORONARY ANGIOPLASTY WITH STENT PLACEMENT      x5    HYSTERECTOMY  1996    dub    PACEMAKER INSERTION      RHINOPLASTY       Scheduled Meds:   aspirin  81 mg Oral Daily  atorvastatin  40 mg Oral Nightly    clopidogrel  75 mg Oral Daily    lisinopril  20 mg Oral Daily    metoprolol succinate  25 mg Oral Daily    sodium chloride flush  10 mL Intravenous 2 times per day    famotidine  20 mg Oral BID    enoxaparin  40 mg Subcutaneous Nightly     Continuous Infusions:   sodium chloride 100 mL/hr at 01/17/20 0630     Medications Prior to Admission:   aspirin 81 MG tablet, Take 81 mg by mouth daily  lisinopril (PRINIVIL;ZESTRIL) 20 MG tablet, Take 20 mg by mouth daily. clopidogrel (PLAVIX) 75 MG tablet, Take 75 mg by mouth daily. metoprolol (TOPROL-XL) 25 MG XL tablet, Take 25 mg by mouth daily. atorvastatin (LIPITOR) 40 MG tablet, Take 40 mg by mouth daily. Allergies   Allergen Reactions    Cortisone Other (See Comments)     Injection site site sunk in    Percocet [Oxycodone-Acetaminophen] Nausea And Vomiting     Family History   Problem Relation Age of Onset    Heart Disease Mother     Ulcerative Colitis Father     Ulcerative Colitis Son      Social History     Tobacco Use   Smoking Status Never Smoker   Smokeless Tobacco Never Used     Social History     Substance and Sexual Activity   Drug Use No     Social History     Substance and Sexual Activity   Alcohol Use No    Alcohol/week: 0.0 standard drinks     ROS:  Constitutional- No weight loss or fevers  Eyes- No diplopia. No photophobia. Ears/nose/throat- No dysphagia. No Dysarthria  Cardiovascular- No palpitations. No chest pain  Respiratory- No dyspnea. No Cough  Gastrointestinal- No Abdominal pain. No Vomiting. Genitourinary- No incontinence. No urinary retention  Musculoskeletal- No myalgia. No arthralgia  Skin- No rash. No easy bruising. Psychiatric- No depression. No anxiety  Endocrine- No diabetes. No thyroid issues. Hematologic- No bleeding difficulty. No fatigue  Neurologic- No weakness. No Headache.     Exam:  Blood pressure 134/81, pulse 75, temperature 97.9 °F (36.6 °C), temperature source Oral, consciousness or not. Sounds like this may have been an orthostatic episode. Her Hg is falling and was + for occult blood, related? Doubt seizure, though does have old stroke that could potentially serve as an epileptogenic focus. 2.  HTN  3. HLD  4. Pacemaker    Recommendations  1. Would like to obtain MRI brain w/o, though unfortunately I've been told that her pacemaker is not compatible. 2.  Will check routine EEG. 3.  Likely needs GI consult. 4.  Defer any decision to hold antiplatelet therapy to primary/GI. 5.  Orthostatic BP/HR, telemetry/pacemaker interrogation, ECHO. 6.  PT/OT evaluation.     7.  Secondary stroke prevention measures - statin, BP control, normoglycemia, etc.      Elsie Valiente NP  [address] [place] [address] [address] Neurology    A copy of this note was provided for Dr Wing Sandy MD

## 2020-01-17 NOTE — PROGRESS NOTES
Physical Therapy  Attempt Note    Patient Name: Rena Solomon   Patient : 1945  MRN: [id number]   Room Number: I3R-7813/1919-50      PT referral received and chart reviewed. I went to patient's bedside to attempt evaluation this afternoon however patient is out of room at EEG test. Will attempt PT evaluation again tomorrow.         Abhay Colon, PT

## 2020-01-17 NOTE — PLAN OF CARE
Dr. Fuentes Senior notified about a drop of H+H from 1/16-1/17 and positive occult stool reading. See new orders, will continue to monitor.

## 2020-01-17 NOTE — PLAN OF CARE
Problem: Pain:  Goal: Pain level will decrease  Description  Pain level will decrease  Outcome: Ongoing  Goal: Control of acute pain  Description  Control of acute pain  Outcome: Ongoing  Goal: Control of chronic pain  Description  Control of chronic pain  Outcome: Ongoing     Problem: Falls - Risk of:  Goal: Will remain free from falls  Description  Will remain free from falls  Outcome: Ongoing  Goal: Absence of physical injury  Description  Absence of physical injury  Outcome: Ongoing     Problem: Discharge Planning:  Goal: Discharged to appropriate level of care  Description  Discharged to appropriate level of care  Outcome: Ongoing

## 2020-01-17 NOTE — PLAN OF CARE
MRI called to inform nurse that MRI cannot be done due to type of pacemaker.  Page put out to notify Raghavendra Portillo NP.

## 2020-01-17 NOTE — PLAN OF CARE
Pt has set off bed/chair alarm 3 times this shift. CMU called for telemetry monitor. Will place in room and continue to monitor. All fall risk precautions are in place. . Bed in lowest position, call light within reach, non-skid socks on when ambulating, bed alarm on, bed wheels locked. Pt. Educated on usage of call light before ambulation.

## 2020-01-18 PROBLEM — D62 ACUTE BLOOD LOSS ANEMIA: Status: ACTIVE | Noted: 2020-01-18

## 2020-01-18 LAB
ABO/RH: NORMAL
ANION GAP SERPL CALCULATED.3IONS-SCNC: 12 MMOL/L (ref 3–16)
ANTIBODY IDENTIFICATION: NORMAL
ANTIBODY SCREEN: NORMAL
BUN BLDV-MCNC: 15 MG/DL (ref 7–20)
CALCIUM SERPL-MCNC: 8.2 MG/DL (ref 8.3–10.6)
CHLORIDE BLD-SCNC: 109 MMOL/L (ref 99–110)
CO2: 19 MMOL/L (ref 21–32)
CREAT SERPL-MCNC: 0.6 MG/DL (ref 0.6–1.2)
FOLATE: 4.57 NG/ML (ref 4.78–24.2)
GFR AFRICAN AMERICAN: >60
GFR NON-AFRICAN AMERICAN: >60
GLUCOSE BLD-MCNC: 111 MG/DL (ref 70–99)
JKA ANTIGEN: NORMAL
MAGNESIUM: 1.6 MG/DL (ref 1.8–2.4)
POTASSIUM SERPL-SCNC: 3.4 MMOL/L (ref 3.5–5.1)
SODIUM BLD-SCNC: 140 MMOL/L (ref 136–145)
T4 FREE: 1 NG/DL (ref 0.9–1.8)
TSH SERPL DL<=0.05 MIU/L-ACNC: 2.18 UIU/ML (ref 0.27–4.2)
VITAMIN B-12: 287 PG/ML (ref 211–911)

## 2020-01-18 PROCEDURE — 2580000003 HC RX 258: Performed by: HOSPITALIST

## 2020-01-18 PROCEDURE — 97116 GAIT TRAINING THERAPY: CPT

## 2020-01-18 PROCEDURE — 82746 ASSAY OF FOLIC ACID SERUM: CPT

## 2020-01-18 PROCEDURE — 36415 COLL VENOUS BLD VENIPUNCTURE: CPT

## 2020-01-18 PROCEDURE — 6370000000 HC RX 637 (ALT 250 FOR IP): Performed by: FAMILY MEDICINE

## 2020-01-18 PROCEDURE — 6360000002 HC RX W HCPCS: Performed by: HOSPITALIST

## 2020-01-18 PROCEDURE — C9113 INJ PANTOPRAZOLE SODIUM, VIA: HCPCS | Performed by: HOSPITALIST

## 2020-01-18 PROCEDURE — 84443 ASSAY THYROID STIM HORMONE: CPT

## 2020-01-18 PROCEDURE — 85025 COMPLETE CBC W/AUTO DIFF WBC: CPT

## 2020-01-18 PROCEDURE — 80048 BASIC METABOLIC PNL TOTAL CA: CPT

## 2020-01-18 PROCEDURE — 97530 THERAPEUTIC ACTIVITIES: CPT

## 2020-01-18 PROCEDURE — 97162 PT EVAL MOD COMPLEX 30 MIN: CPT

## 2020-01-18 PROCEDURE — 82607 VITAMIN B-12: CPT

## 2020-01-18 PROCEDURE — 1200000000 HC SEMI PRIVATE

## 2020-01-18 PROCEDURE — 6370000000 HC RX 637 (ALT 250 FOR IP): Performed by: HOSPITALIST

## 2020-01-18 PROCEDURE — 97535 SELF CARE MNGMENT TRAINING: CPT

## 2020-01-18 PROCEDURE — 83735 ASSAY OF MAGNESIUM: CPT

## 2020-01-18 PROCEDURE — 84439 ASSAY OF FREE THYROXINE: CPT

## 2020-01-18 PROCEDURE — 94760 N-INVAS EAR/PLS OXIMETRY 1: CPT

## 2020-01-18 PROCEDURE — 97166 OT EVAL MOD COMPLEX 45 MIN: CPT

## 2020-01-18 RX ADMIN — Medication 10 ML: at 21:12

## 2020-01-18 RX ADMIN — PANTOPRAZOLE SODIUM 40 MG: 40 INJECTION, POWDER, FOR SOLUTION INTRAVENOUS at 21:11

## 2020-01-18 RX ADMIN — POTASSIUM BICARBONATE 40 MEQ: 782 TABLET, EFFERVESCENT ORAL at 18:34

## 2020-01-18 RX ADMIN — PANTOPRAZOLE SODIUM 40 MG: 40 INJECTION, POWDER, FOR SOLUTION INTRAVENOUS at 09:43

## 2020-01-18 RX ADMIN — LISINOPRIL 20 MG: 20 TABLET ORAL at 09:43

## 2020-01-18 RX ADMIN — Medication 10 ML: at 09:43

## 2020-01-18 RX ADMIN — POTASSIUM CHLORIDE 40 MEQ: 1500 TABLET, EXTENDED RELEASE ORAL at 09:43

## 2020-01-18 RX ADMIN — METOPROLOL SUCCINATE 25 MG: 25 TABLET, EXTENDED RELEASE ORAL at 09:43

## 2020-01-18 RX ADMIN — ATORVASTATIN CALCIUM 40 MG: 40 TABLET, FILM COATED ORAL at 21:11

## 2020-01-18 RX ADMIN — CLOPIDOGREL BISULFATE 75 MG: 75 TABLET ORAL at 09:43

## 2020-01-18 ASSESSMENT — PAIN SCALES - GENERAL: PAINLEVEL_OUTOF10: 0

## 2020-01-18 NOTE — PROGRESS NOTES
diabetes. No thyroid issues. Hematologic- No bleeding difficulty. No fatigue  Neurologic- No weakness. No Headache. Labs  Glucose 111  Na 140  K 3.4  BUN 15  Creatinine 0.6    WBC 8.2K  Hg 8.1  Platelets 947    Ferritin 20.9  Iron 18  Iron saturation 5    Stool + occult blood    Urine culture non-specific    Studies  EEG 1/17/20  Mild back ground slowing and disorganization. Focal slowing and disorganization in right temporal/occipital region. No definitive epileptiform activity. CT head w/o 1/16/20  No acute abnormality. Focal area of encephalomalacia in the right parietal occipital region consistent w/ remote infarct (new from 2018)  Stable chronic ischemic changes in the white matter and right basal ganglia.         Carotid US 8/58/37  JESSICA/LICA 4-81% stenosis     TTE 1/17/20  EF 55%  Left atrium mildly dilated. EKG 1/16/20  NSR    Impression  1. Dizziness w/ unwitnessed fall. Wonder about an orthostatic episode, maybe secondary to anemia. She has had some fluctuating encephalopathy, for which the cause is not clear at the moment. It is reasonable to consider the possibly of seizure related to her clinical picture in general (initially and ongoing), as she does have an old infarct that could plausibly be serving as an epileptogenic focus. EEG w/ nothing epileptiform or any recorded seizures, w/ slowing noted in area of previous stroke. Recommendations  1. Can't get MRI brain due to pacemaker. 2.  GI to see. Will defer antiplatelet regimen in the acute timeframe to their discretion. 3.  If her encephalopathy continues, may consider a trial of Keppra 500 mg BID.     4.  Delirium precautions - frequent reorientation, familiar objects at the bedside, attempt to maintain normal sleep/wake cycles, blinds up during the day and down at night, avoid benzodiazepines if able, etc.      Tamara Shirley NP  [address] [place] [address] [address] Neurology    A copy of this note was provided for Dr Shirley Salmon Abdifatah Carreon MD

## 2020-01-18 NOTE — PLAN OF CARE
Pt currently sleeping after finishing breakfast. Has not pulled at lines or attempted to get out of bed without assistance. Will continue to monitor.

## 2020-01-18 NOTE — PROGRESS NOTES
[facility] Medicine Progress Note     Date:  1/18/2020    PCP: Daren Fowler (Tel: [phone])    Date of Admission: 1/16/2020        Subjective  No new complaints    Objective  Physical exam:  Vitals: /75   Pulse 73   Temp 98.4 °F (36.9 °C) (Oral)   Resp 16   Ht 4' 10\" (1.473 m)   Wt 144 lb 6.4 oz (65.5 kg)   SpO2 93%   BMI 30.18 kg/m²   Gen: Not in distress. Alert. Head: Normocephalic. Atraumatic. Eyes: EOMI. Good acuity. ENT: Oral mucosa moist  Neck: No JVD. No obvious thyromegaly. CVS: Nml S1S2, no MRG, RRR  Pulmomary: Clear bilaterally. No crackles. No wheezes. Gastrointestinal: Soft, NT/ND. Positive bowel sounds. Musculoskeletal: No edema. Warm  Neuro: No focal deficit. Moves extremity spontaneously. Psychiatry: Appropriate affect. Not agitated. Skin: Warm, dry with normal turgor. No rash      24HR INTAKE/OUTPUT:      Intake/Output Summary (Last 24 hours) at 1/18/2020 1802  Last data filed at 1/18/2020 1401  Gross per 24 hour   Intake 560 ml   Output --   Net 560 ml     I/O last 3 completed shifts: In: 560 [P.O.:560]  Out: -   No intake/output data recorded.       Meds:    potassium bicarb-citric acid  40 mEq Oral Once    pantoprazole  40 mg Intravenous BID    atorvastatin  40 mg Oral Nightly    clopidogrel  75 mg Oral Daily    lisinopril  20 mg Oral Daily    metoprolol succinate  25 mg Oral Daily    sodium chloride flush  10 mL Intravenous 2 times per day       Infusions:    sodium chloride 100 mL/hr at 01/17/20 1710         PRN Meds: haloperidol lactate, sodium chloride flush, potassium chloride **OR** potassium alternative oral replacement **OR** potassium chloride, magnesium hydroxide, ondansetron    Labs/imaging:  CBC:   Recent Labs     01/16/20  1441 01/17/20  0633 01/17/20  1447 01/18/20  0549   WBC 10.7 9.4  --  8.2   HGB 10.4* 8.0* 8.8* 8.1*    330  --  324         BMP:    Recent Labs     01/16/20  1441 01/17/20  0633 01/18/20  0549   * 137 140 K 4.1 3.7 3.4*    107 109   CO2 22 20* 19*   BUN 17 19 15   CREATININE 0.8 0.7 0.6   GLUCOSE 93 97 111*         Hepatic:   Recent Labs     01/16/20  1441   AST 15   ALT 10   BILITOT 0.3   ALKPHOS 124       Troponin:   Recent Labs     01/16/20  1441   TROPONINI <0.01         BNP: No results for input(s): BNP in the last 72 hours. INR: No results for input(s): INR in the last 72 hours. Reviewed imaging and reports noted      Assessment:  Active Problems:    CAD (coronary artery disease)    Pacemaker    HTN (hypertension)    Hypercholesteremia    UC (ulcerative colitis) ([facility] [address]    Change in mental status    Acute blood loss anemia  Resolved Problems:    * No resolved hospital problems. *        Plan:  Syncope and collapse  -Clinically unable to determine  -Appreciate neurology recommendations  -CT head negative for any acute intracranial hemorrhage, did reveal focal area of encephalomalacia in the right parieto-occipital region consistent with remote infarct which is new from previous CT in 2018  -Cannot obtain MRI due to pacemaker  -Echo revealed EF of 55%, carotid ultrasound Dopplers did not reveal any significant stenosis  -Continue statin, hold aspirin and Plavix for now        Increased confusion and agitation  -Was present during admission, I have not encountered the symptoms thus I am clinically unable to determine the cause of this      Possible GI bleed  -GI consulted, aspirin and Plavix on hold  -Continue IV Protonix, continue to monitor, transfuse hemoglobin less than 7      Acute blood loss anemia  -Secondary to above, continue to monitor      CAD  -Hold aspirin and Plavix, continue statin, beta-blocker      Hypokalemia  -Replace, continue to monitor      Essential hypertension  -BP stable, continue present management, continue to monitor      Diet: DIET GENERAL;     Activity: Up with assist  Prophylaxis: SCD    Code status: Full Code     ----------        Syeda Vitale

## 2020-01-18 NOTE — PROGRESS NOTES
Pt awake, tearful and confused. Pt does not remember why she came to the hospital. Pt is less agitated this am and cooperative with pt care. Bilateral wrists restraints in place.

## 2020-01-18 NOTE — PROGRESS NOTES
Occupational Therapy   Occupational Therapy Initial Assessment   If pt is d/c'd prior to next tx session this note will serve as d/c summary. Date: 2020   Patient Name: Paulette Macario  MRN: [id number]     : 1945    Date of Service: 2020    Discharge Recommendations:  3-5 sessions per week, Continue to assess pending progress  Paulette Macario scored a 17/24 on the AM-PAC ADL Inpatient form. Current research shows that an AM-PAC score of 17 or less is typically not associated with a discharge to the patient's home setting. Based on the patients AM-PAC score and their current ADL deficits, it is recommended that the patient have 3-5 sessions per week of Occupational Therapy at d/c to increase the patients independence. OT Equipment Recommendations  Other: Uncertain due to questionable historian    Assessment   Performance deficits / Impairments: Decreased functional mobility ; Decreased cognition;Decreased ADL status; Decreased safe awareness;Decreased balance;Decreased high-level IADLs    Assessment: \"70 y. o.female With encephalopathy and r/o seizures. PMH coronary artery disease, ulcerative colitis, anemia, blood transfusions, Pacer, HTN, hyperlipidemia. S/p fall at home  EEG shows Mild back ground slowing and disorganization in right temporal/occipital region. Pt lived with her son and reports she was indep prior to admit. She states that she could perform all ADLs, transfers without an AD and shared homemanagement with her son. She is currently well below that baseline. She is having significant difficulty following verbal commands and has frequent episodes of confusion in conversation, perseverating  on topics. She has difficulty terminating activities but is willing to participate. She requires min/CGA for transfers and anticipate this for LE ADLs as well as home management tasks.  Assessment is ongoing as she will most likely do better due to confusiion if she is able to return to home but if she needed to leave today she would require continued skilled OT for 3-5x/week. Treatment Diagnosis: Imp ADLs, IADLs, cognition, functional mobility, transfers and balance due to encephalopathy and fall  Prognosis: Fair  Decision Making: Medium Complexity  History: coronary artery disease, ulcerative colitis, anemia, blood transfusions, Pacer, HTN, hyperlipidemia. S/p fall at home  Exam: see above  Assistance / Modification: Min/CGA  OT Education: OT Role;Plan of Care;Orientation  Patient Education: Pt is reoriented frequently throughout session  Barriers to Learning: Cognition, [place]  REQUIRES OT FOLLOW UP: Yes  Activity Tolerance  Activity Tolerance: Patient Tolerated treatment well;Treatment limited secondary to decreased cognition  Safety Devices  Safety Devices in place: Yes  Type of devices: All fall risk precautions in place; Left in chair;Nurse notified; Chair alarm in place;Call light within reach(RNKayden)           Patient Diagnosis(es): The primary encounter diagnosis was Fall in home, initial encounter. Diagnoses of Blood in stool and History of ulcerative colitis were also pertinent to this visit. has a past medical history of Acute MI ([facility] [address] Eczema, [place] (hard of hearing), Hypercholesteremia, Hypertension, Pacemaker, Ulcerative colitis, and Wears hearing aid. has a past surgical history that includes Hysterectomy (1996); Pacemaker insertion; Coronary angioplasty with stent; Ankle fracture surgery (7/26/11); and rhinoplasty. Treatment Diagnosis: Imp ADLs, IADLs, cognition, functional mobility, transfers and balance due to encephalopathy and fall      Restrictions  Restrictions/Precautions  Restrictions/Precautions: Fall Risk    Subjective   General  Chart Reviewed: Yes  Patient assessed for rehabilitation services?: Yes  Additional Pertinent Hx: \"70 y. o.female With medical history significant for coronary artery disease, ulcerative colitis, anemia and blood transfusions.  Patient presented to the emergency room when she was found down by her son in the bathroom floor. Patient could not recall the circumstances around her fall. Patient does report that she has been experiencing Episodes of dizziness. \" Per Dr. Brandi Chawla 1/16/2020. EEG shows Mild back ground slowing and disorganization in right temporal/occipital region. No definitive epileptiform activity     Family / Caregiver Present: No  Referring Practitioner: Dr. Eddie Parrish  Diagnosis: encephalopathy R/O seizures  Subjective  Subjective: Pt seen bedside with PT. Denies pain throughout. Has confused conversations perseverating on  seeing 4 little people yesterday who put jelly in her hair (EEG?)  General Comment  Comments: Pt pleasant to work with and agreeable to OT/PT co-evaluation but needs repetition and demonstration to follow commands  Oxygen Therapy  SpO2: 95 %  Pulse Oximeter Device Mode: Intermittent  O2 Device: None (Room air)  Social/Functional History  Social/Functional History  Lives With: Son  Type of Home: House  Home Layout: One level, Laundry in basement, Able to Live on Main level with bedroom/bathroom(14 step down to basement)  Home Access: Stairs to enter without rails  Entrance Stairs - Number of Steps: 1  Bathroom Shower/Tub: Tub/Shower unit, Shower chair with back  [employer]: Standard  Bathroom Equipment: Grab bars in shower, Grab bars around toilet, Hand-held shower  Bathroom Accessibility: Not accessible(with a walker)  Home Equipment: Grab bars  Receives Help From: Family  ADL Assistance: Independent(per pt)  Homemaking Assistance: (shares with son)  Ambulation Assistance: Independent  Transfer Assistance: Independent  Active : No(son drives)  Mode of Transportation: Family  Additional Comments: Pt is a questionable historian. Most of the social history was copied from earlier stay in  2017. Pt reports she uses the microwave when cooking.        Objective   Vision: Impaired(glasses)  Hearing: Exceptions to transfer OT care to primary OT.   OT license: [id number]  Lord Carlos OT   Electronically signed by Lord Carlos OT on 1/18/2020 at 1:58 PM

## 2020-01-18 NOTE — PROCEDURES
[address] [place] [address]                          ELECTROENCEPHALOGRAM REPORT    PATIENT NAME: Merlin Bacca                      :        1945  MED REC NO:   [id number]                          ROOM:       4120  ACCOUNT NO:   [de-identified]                           ADMIT DATE: 2020  PROVIDER:     Boby Oliveros DO    DATE OF EE2020    REFERRING PROVIDER:  Simi Herzog NP    REASON FOR STUDY:  Seizure. BRIEF HISTORY AND NEUROLOGIC FINDINGS:  The patient is a 41-year-old  female being evaluated for possible seizure. MEDICATIONS:  The patient's medications did not include any centrally  acting medications. EEG FINDINGS:  This is a 20-channel digital EEG performed utilizing  bipolar and referential montages. Wakefulness, drowsiness, and sleep  were obtained during the recording. During maximum wakefulness, there  was a moderate voltage, symmetric, somewhat disorganized though reactive  7 Hz posterior background rhythm. The anterior background consisted of  low to moderate voltage mixed frequencies. Drowsiness was manifested by  attenuation of the waking background rhythms. Stage 2 sleep was  manifested by somewhat poorly formed K-complexes. Vertex waves were  also noted in light sleep. There appeared to be a focal theta and delta slowing with increased  disorganization in the right temporooccipital region. Photic stimulation was performed at various flash frequencies and evoked  a minimal posterior driving response at a few isolated frequencies. Hyperventilation exercise was not performed due to the patient's age and  clinical history. A 1-channel EKG rhythm strip was reviewed and showed no significant  cardiac dysrhythmias. EEG DIAGNOSIS:  This EEG is abnormal due to the presence of mild  background slowing and disorganization.   There was also focal slowing  and disorganization in the right temporooccipital region as well during  the study. CLINICAL INTERPRETATION:  The focal slowing and disorganization in the  right temporooccipital region is nonspecific and suggestive of possible  focal neuronal dysfunction in the involved region. The generalized  background slowing and disorganization, also nonspecific, is consistent  with a mild encephalopathy. This maybe seen in multiple settings  including toxic, metabolic, or degenerative conditions as well as with  medication effect. No definite epileptiform activity was present during  this recording. If seizures remain a clinical concern, then a repeat  EEG maybe of further benefit. Clinical correlation is advised.         Lenore Vargas DO    D: 01/17/2020 16:45:53       T: 01/17/2020 21:55:22     /[email or web address]  Job#: [id number]     Doc#: [id number]    CC:

## 2020-01-18 NOTE — CONSULTS
Gastroenterology Consult Note      Patient: Kendra Morris  : 1945  Acct#:      Date:  2020    Subjective:       History of Present Illness  Patient is a 69 y.o.  female who is seen in consult for anemia. 69 y.o. female with a PMH of UC, CAD on plavix, pacemaker, HTN, HLD who presented on 2020 with syncope. According to chart notes she was found down on the bathroom floor by her son. We have been consulted regarding anemia with positive heme occult stool test.     She was recently admitted to [facility] last month with abdominal pain, diarrhea, rectal bleeding. She was found to be anemic and required 3u blood transfusion during her hospital stay. She underwent colonoscopy on 19 with Dr. Adam Lombardo which showed pancolitis. Biopsies showed moderate to severe chronic active colitis. Initially suspected to have ulcerative colitis however stool studies were positive for E. Coli. She was treated with a 5-day course of Bactrim. Her hemoglobin on discharge was 8.7.       Blood work this admission showed an intial hemoglobin of 10.4, now dropped to 8.0. Mely Mello Past Medical History:   Diagnosis Date    Acute MI ([address]     Eczema     [place]hard of hearing[place]     Hypercholesteremia     Hypertension     Pacemaker     Ulcerative colitis     \"resolved\" per pt    Wears hearing aid       Past Surgical History:   Procedure Laterality Date    ANKLE FRACTURE SURGERY  11    ORIF left ankle fracture    CORONARY ANGIOPLASTY WITH STENT PLACEMENT      x5    HYSTERECTOMY      dub    PACEMAKER INSERTION      RHINOPLASTY        Past Endoscopic History:   Colonoscopy 2019 with Dr. Adam Lombardo  1. Ulcerative pan-colitis, mild in right colon and moderate/severe in the left colon  2. Random biopsies obtained  3. Normal terminal ileum  4. Mild sigmoid colon diverticulosis  5.  Small internal hemorrhoids  A.  Right colon, biopsy:  - One colonic fragment with mild chronic active colitis. - Remaining colonic fragments with no significant abnormality.  - No dysplasia identified. B.  Left colon, biopsy:  - Moderate to severe chronic active colitis. - No dysplasia identified. Admission Meds  No current facility-administered medications on file prior to encounter. Current Outpatient Medications on File Prior to Encounter   Medication Sig Dispense Refill    aspirin 81 MG tablet Take 81 mg by mouth daily      lisinopril (PRINIVIL;ZESTRIL) 20 MG tablet Take 20 mg by mouth daily.  clopidogrel (PLAVIX) 75 MG tablet Take 75 mg by mouth daily.  metoprolol (TOPROL-XL) 25 MG XL tablet Take 25 mg by mouth daily.  atorvastatin (LIPITOR) 40 MG tablet Take 40 mg by mouth daily. Allergies  Allergies   Allergen Reactions    Cortisone Other (See Comments)     Injection site site sunk in    Percocet [Oxycodone-Acetaminophen] Nausea And Vomiting        Social history:  Social History     Tobacco Use    Smoking status: Never Smoker    Smokeless tobacco: Never Used   Substance Use Topics    Alcohol use: No     Alcohol/week: 0.0 standard drinks        Family History:   Family History   Problem Relation Age of Onset    Heart Disease Mother     Ulcerative Colitis Father     Ulcerative Colitis Son           Review of Systems  Pertinent items are noted in HPI. Physical Exam:  Blood pressure (!) 126/93, pulse 80, temperature 97.5 °F (36.4 °C), temperature source Oral, resp. rate 18, height 4' 10\" (1.473 m), weight 144 lb 6.4 oz (65.5 kg), SpO2 95 %, not currently breastfeeding. General appearance: alert, cooperative, no distress, appears stated age  Eyes: Anicteric  Head: Normocephalic, without obvious abnormality  Lungs: clear to auscultation bilaterally, Normal Effort  Heart: regular rate and rhythm, normal S1 and S2, no murmurs or rubs  Abdomen: soft, non-tender. Bowel sounds normal. No masses,  no organomegaly.    Extremities: atraumatic, no
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
## 2020-01-18 NOTE — PROGRESS NOTES
Pt remains agitated and confused, trying to get out of restraints. Attempted to check pt for incontinence and she screamed STOP. Pt disoriented x 3 but does know her name is Kristal Mccarthy. Will continue to monitor.

## 2020-01-18 NOTE — PLAN OF CARE
Pt slept most of the morning until breakfast. Woke up pleasantly asking to go to the bathroom. Ambulated with pt x1. Tolerated well, pt is A+Ox3, disorientated to situation. Discontinued wrist restraints and posey vest. Pt states that she will use the call light before ambulating, teaching needs reinforcement but fall precautions are in place. Bed in lowest position, call light within reach, non-skid socks on when ambulating, bed alarm on, bed wheels locked. Telecam is in the room and CMU was notified that pt no longer has on restraints. Will continue to monitor throughout the shift.  Electronically signed by Manjula Hollis RN on 1/18/2020 at 9:56 AM

## 2020-01-19 LAB
ANION GAP SERPL CALCULATED.3IONS-SCNC: 13 MMOL/L (ref 3–16)
BUN BLDV-MCNC: 12 MG/DL (ref 7–20)
CALCIUM SERPL-MCNC: 8.6 MG/DL (ref 8.3–10.6)
CHLORIDE BLD-SCNC: 105 MMOL/L (ref 99–110)
CO2: 21 MMOL/L (ref 21–32)
CREAT SERPL-MCNC: 0.6 MG/DL (ref 0.6–1.2)
GFR AFRICAN AMERICAN: >60
GFR NON-AFRICAN AMERICAN: >60
GLUCOSE BLD-MCNC: 89 MG/DL (ref 70–99)
POTASSIUM SERPL-SCNC: 3.9 MMOL/L (ref 3.5–5.1)
SODIUM BLD-SCNC: 139 MMOL/L (ref 136–145)

## 2020-01-19 PROCEDURE — 6360000002 HC RX W HCPCS: Performed by: INTERNAL MEDICINE

## 2020-01-19 PROCEDURE — 6370000000 HC RX 637 (ALT 250 FOR IP): Performed by: HOSPITALIST

## 2020-01-19 PROCEDURE — 85027 COMPLETE CBC AUTOMATED: CPT

## 2020-01-19 PROCEDURE — 1200000000 HC SEMI PRIVATE

## 2020-01-19 PROCEDURE — 2580000003 HC RX 258: Performed by: INTERNAL MEDICINE

## 2020-01-19 PROCEDURE — 6360000002 HC RX W HCPCS: Performed by: HOSPITALIST

## 2020-01-19 PROCEDURE — 80048 BASIC METABOLIC PNL TOTAL CA: CPT

## 2020-01-19 PROCEDURE — 6370000000 HC RX 637 (ALT 250 FOR IP): Performed by: INTERNAL MEDICINE

## 2020-01-19 PROCEDURE — 92523 SPEECH SOUND LANG COMPREHEN: CPT

## 2020-01-19 PROCEDURE — 2580000003 HC RX 258: Performed by: FAMILY MEDICINE

## 2020-01-19 PROCEDURE — 97129 THER IVNTJ 1ST 15 MIN: CPT

## 2020-01-19 PROCEDURE — 6370000000 HC RX 637 (ALT 250 FOR IP): Performed by: NURSE PRACTITIONER

## 2020-01-19 PROCEDURE — 94760 N-INVAS EAR/PLS OXIMETRY 1: CPT

## 2020-01-19 PROCEDURE — 2580000003 HC RX 258: Performed by: HOSPITALIST

## 2020-01-19 PROCEDURE — C9113 INJ PANTOPRAZOLE SODIUM, VIA: HCPCS | Performed by: HOSPITALIST

## 2020-01-19 PROCEDURE — 36415 COLL VENOUS BLD VENIPUNCTURE: CPT

## 2020-01-19 RX ORDER — SODIUM CHLORIDE 0.9 % (FLUSH) 0.9 %
10 SYRINGE (ML) INJECTION EVERY 12 HOURS SCHEDULED
Status: DISCONTINUED | OUTPATIENT
Start: 2020-01-19 | End: 2020-01-25 | Stop reason: HOSPADM

## 2020-01-19 RX ORDER — LEVETIRACETAM 250 MG/1
250 TABLET ORAL 2 TIMES DAILY
Status: DISCONTINUED | OUTPATIENT
Start: 2020-01-19 | End: 2020-01-25 | Stop reason: HOSPADM

## 2020-01-19 RX ORDER — FOLIC ACID 1 MG/1
4 TABLET ORAL DAILY
Status: DISCONTINUED | OUTPATIENT
Start: 2020-01-19 | End: 2020-01-25 | Stop reason: HOSPADM

## 2020-01-19 RX ORDER — SODIUM CHLORIDE 0.9 % (FLUSH) 0.9 %
10 SYRINGE (ML) INJECTION PRN
Status: DISCONTINUED | OUTPATIENT
Start: 2020-01-19 | End: 2020-01-25 | Stop reason: HOSPADM

## 2020-01-19 RX ORDER — LANOLIN ALCOHOL/MO/W.PET/CERES
1000 CREAM (GRAM) TOPICAL DAILY
Status: DISCONTINUED | OUTPATIENT
Start: 2020-01-19 | End: 2020-01-25 | Stop reason: HOSPADM

## 2020-01-19 RX ADMIN — METOPROLOL SUCCINATE 25 MG: 25 TABLET, EXTENDED RELEASE ORAL at 08:37

## 2020-01-19 RX ADMIN — Medication 10 ML: at 08:37

## 2020-01-19 RX ADMIN — SODIUM CHLORIDE, PRESERVATIVE FREE 10 ML: 5 INJECTION INTRAVENOUS at 21:29

## 2020-01-19 RX ADMIN — CYANOCOBALAMIN TAB 1000 MCG 1000 MCG: 1000 TAB at 13:56

## 2020-01-19 RX ADMIN — LEVETIRACETAM 250 MG: 250 TABLET ORAL at 21:29

## 2020-01-19 RX ADMIN — BISACODYL 20 MG: 5 TABLET, COATED ORAL at 17:07

## 2020-01-19 RX ADMIN — IRON SUCROSE 200 MG: 20 INJECTION, SOLUTION INTRAVENOUS at 14:18

## 2020-01-19 RX ADMIN — FOLIC ACID 4 MG: 1 TABLET ORAL at 13:56

## 2020-01-19 RX ADMIN — LISINOPRIL 20 MG: 20 TABLET ORAL at 08:37

## 2020-01-19 RX ADMIN — PANTOPRAZOLE SODIUM 40 MG: 40 INJECTION, POWDER, FOR SOLUTION INTRAVENOUS at 21:29

## 2020-01-19 RX ADMIN — ATORVASTATIN CALCIUM 40 MG: 40 TABLET, FILM COATED ORAL at 21:29

## 2020-01-19 RX ADMIN — LEVETIRACETAM 250 MG: 250 TABLET ORAL at 13:57

## 2020-01-19 RX ADMIN — SODIUM CHLORIDE: 9 INJECTION, SOLUTION INTRAVENOUS at 14:41

## 2020-01-19 RX ADMIN — PANTOPRAZOLE SODIUM 40 MG: 40 INJECTION, POWDER, FOR SOLUTION INTRAVENOUS at 08:37

## 2020-01-19 RX ADMIN — MAGNESIUM CITRATE 296 ML: 1.75 LIQUID ORAL at 17:07

## 2020-01-19 ASSESSMENT — PAIN SCALES - GENERAL
PAINLEVEL_OUTOF10: 0

## 2020-01-19 NOTE — PROGRESS NOTES
[facility] Medicine Progress Note     Date:  1/19/2020    PCP: Albert Alejandro (Tel: [phone])    Date of Admission: 1/16/2020        Subjective  No new complaints    Objective  Physical exam:  Vitals: /83   Pulse 77   Temp 97.7 °F (36.5 °C) (Oral)   Resp 15   Ht 4' 10\" (1.473 m)   Wt 146 lb 9.7 oz (66.5 kg)   SpO2 91%   BMI 30.64 kg/m²   Gen: Not in distress. Alert. Head: Normocephalic. Atraumatic. Eyes: EOMI. Good acuity. ENT: Oral mucosa moist  Neck: No JVD. No obvious thyromegaly. CVS: Nml S1S2, no MRG, RRR  Pulmomary: Clear bilaterally. No crackles. No wheezes. Gastrointestinal: Soft, NT/ND. Positive bowel sounds. Musculoskeletal: No edema. Warm  Neuro: No focal deficit. Moves extremity spontaneously. Psychiatry: Appropriate affect. Not agitated. Skin: Warm, dry with normal turgor. No rash      24HR INTAKE/OUTPUT:      Intake/Output Summary (Last 24 hours) at 1/19/2020 1524  Last data filed at 1/19/2020 1401  Gross per 24 hour   Intake 1580 ml   Output --   Net 1580 ml     I/O last 3 completed shifts: In: 3220 [P.O.:600; I.V.:980]  Out: -   No intake/output data recorded.       Meds:    levETIRAcetam  250 mg Oral BID    folic acid  4 mg Oral Daily    vitamin B-12  1,000 mcg Oral Daily    sodium chloride flush  10 mL Intravenous 2 times per day    iron sucrose  200 mg Intravenous Daily    pantoprazole  40 mg Intravenous BID    atorvastatin  40 mg Oral Nightly    lisinopril  20 mg Oral Daily    metoprolol succinate  25 mg Oral Daily       Infusions:    sodium chloride 75 mL/hr at 01/19/20 1441         PRN Meds: sodium chloride flush, haloperidol lactate, potassium chloride **OR** potassium alternative oral replacement **OR** potassium chloride, magnesium hydroxide, ondansetron    Labs/imaging:  CBC:   Recent Labs     01/17/20  0633 01/17/20  1447 01/18/20  0549 01/19/20  0812   WBC 9.4  --  8.2 8.2   HGB 8.0* 8.8* 8.1* 8.6*     --  324 391         BMP:    Recent Labs     01/17/20  0633 01/18/20  0549 01/19/20  0812    140 139   K 3.7 3.4* 3.9    109 105   CO2 20* 19* 21   BUN 19 15 12   CREATININE 0.7 0.6 0.6   GLUCOSE 97 111* 89         Hepatic:   No results for input(s): AST, ALT, ALB, BILITOT, ALKPHOS in the last 72 hours. Troponin:   No results for input(s): TROPONINI in the last 72 hours. BNP: No results for input(s): BNP in the last 72 hours. INR: No results for input(s): INR in the last 72 hours. Reviewed imaging and reports noted      Assessment:  Active Problems:    CAD (coronary artery disease)    Pacemaker    HTN (hypertension)    Hypercholesteremia    UC (ulcerative colitis) ([facility] [address]    Change in mental status    Acute blood loss anemia  Resolved Problems:    * No resolved hospital problems.  *        Plan:  Syncope and collapse  -Clinically unable to determine  -Appreciate neurology recommendations  -CT head negative for any acute intracranial hemorrhage, did reveal focal area of encephalomalacia in the right parieto-occipital region consistent with remote infarct which is new from previous CT in 2018  -Cannot obtain MRI due to pacemaker  -Echo revealed EF of 55%, carotid ultrasound Dopplers did not reveal any significant stenosis  -Continue statin, hold aspirin and Plavix for now        Increased confusion and agitation  -Was present during admission, I have not encountered the symptoms thus I am clinically unable to determine the cause of this      Possible GI bleed  -Appreciate GI recommendations, plan for EGD with flex sigmoidoscopy on Monday  - aspirin and Plavix on hold  -Continue IV Protonix, continue to monitor, transfuse hemoglobin less than 7      Acute blood loss anemia  -Secondary to above, continue to monitor      CAD  -Hold aspirin and Plavix, continue statin, beta-blocker      Hypokalemia  -Replace, continue to monitor      Essential hypertension  -BP stable, continue present management, continue to monitor      Diet: DIET GENERAL;     Activity: Up with assist  Prophylaxis: SCD    Code status: Full Code     ----------        Armando Hoyt MD  -------------------------------  Lor hospitalist

## 2020-01-19 NOTE — PROGRESS NOTES
Speech Language Pathology  Facility/Department: [facility] SURG  Initial Speech/Language/Cognitive Assessment    NAME: Paulette Macario  :    MRN: [id number]  ADMISSION DATE: 2020  ADMITTING DIAGNOSIS: has Ankle fracture; CAD (coronary artery disease); Pacemaker; HTN (hypertension); Hypercholesteremia; UC (ulcerative colitis) ([facility] [address] Intractable headache; Change in mental status; and Acute blood loss anemia on their problem list.  DATE ONSET: 2020    Date of Eval: 2020   Evaluating Therapist: MAHIN Campbell    RECENT RESULTS  CT OF HEAD/MRI:  2020:     1. No acute intracranial hemorrhage   2. Focal area of encephalomalacia has developed involving the right   parietal-occipital region consistent with remote infarct.  This is new from   prior CT of 2018. 3. Stable chronic ischemic changes involving the periventricular white matter   and the right basal ganglia.             Primary Complaint:       Fall       lives with son. son found her on ground in bathroom. pt unsure of how she fell. denies injury/pain. . a/ox4         Pain:  Pain Assessment  Pain Assessment: 0-10  Pain Level: 0  Patient's Stated Pain Goal: 3  Pain Type: Acute pain  Pain Location: Head  Pain Orientation: Anterior  Pain Descriptors: Aching  Pain Frequency: Intermittent  Pain Onset: On-going  Clinical Progression: Not changed  Functional Pain Assessment: Activities are not prevented  Non-Pharmaceutical Pain Intervention(s): Relaxation techniques, Repositioned, Rest  Response to Pain Intervention: Patient Satisfied    Assessment:  Cognitive Diagnosis: mild-mod orientation; mild-mod memory; mild attention deficits; mod PS for basic problems; poor initiation. Aphasia Diagnosis: no aphasia  Speech Diagnosis: no dysarthria  Communication Diagnosis: pt had poor initiation of communication. Pt was able to comprehend and express herself, but did not initiate it on her own.    Diagnosis: cognitive

## 2020-01-19 NOTE — PROGRESS NOTES
Gastroenterology Progress Note    Krista Blanchard is a 69 y.o. female patient. Hospitalization Day:3    SUBJECTIVE:  Sleepy. No complaints. ROS:  Gastrointestinal ROS: no abdominal pain, change in bowel habits, or black or bloody stools    Physical    VITALS:  /86   Pulse 74   Temp 97.8 °F (36.6 °C) (Oral)   Resp 16   Ht 4' 10\" (1.473 m)   Wt 146 lb 9.7 oz (66.5 kg)   SpO2 95%   BMI 30.64 kg/m²   TEMPERATURE:  Current - Temp: 97.8 °F (36.6 °C); Max - Temp  Av °F (36.7 °C)  Min: 97.6 °F (36.4 °C)  Max: 98.4 °F (36.9 °C)    General:  Alert and oriented,  No apparent distress  Skin- without jaundice  Eyes: anicteric sclera  Cardiac: RRR, Nl s1s2, without murmurs  Lungs CTA Bilaterally, normal effort  Abdomen soft, ND, NT, no HSM, Bowel sounds normal  Ext: without edema  Neuro: no asterixis     Data    Data Review:    Recent Labs     20  0633 20  1447 20  0549 20  0812   WBC 9.4  --  8.2 8.2   HGB 8.0* 8.8* 8.1* 8.6*   HCT 26.0* 28.2* 26.1* 27.7*   MCV 68.6*  --  68.3* 68.4*     --  324 391     Recent Labs     20  0633 20  0549 20  0812    140 139   K 3.7 3.4* 3.9    109 105   CO2 20* 19* 21   BUN 19 15 12   CREATININE 0.7 0.6 0.6     Recent Labs     20  1441   AST 15   ALT 10   BILITOT 0.3   ALKPHOS 124     No results for input(s): LIPASE, AMYLASE in the last 72 hours. No results for input(s): PROTIME, INR in the last 72 hours. Radiology Review:    VL DUP CAROTID BILATERAL   Final Result      CT Head WO Contrast   Final Result   1. No acute intracranial hemorrhage   2. Focal area of encephalomalacia has developed involving the right   parietal-occipital region consistent with remote infarct. This is new from   prior CT of 2018. 3. Stable chronic ischemic changes involving the periventricular white matter   and the right basal ganglia.          CT Cervical Spine WO Contrast   Final Result   No radiographic
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
## 2020-01-19 NOTE — PROGRESS NOTES
8.2K  Hg 8.6  Platelets 679    TSH 2.18  T4 Free 1.0  Folate 4.57  Vitamin B12 - 287    Ferritin 20.9  Iron 18  Iron saturation 5     Stool + occult blood     Urine culture non-specific    Studies  EEG 1/17/20  Mild back ground slowing and disorganization. Focal slowing and disorganization in right temporal/occipital region. No definitive epileptiform activity.       CT head w/o 1/16/20  No acute abnormality.    Focal area of encephalomalacia in the right parietal occipital region consistent w/ remote infarct (new from 2018)  Stable chronic ischemic changes in the white matter and right basal ganglia.          Carotid US 2/58/50  JESSICA/LICA 3-21% stenosis     TTE 1/17/20  EF 55%  Left atrium mildly dilated.       EKG 1/16/20  NSR    Impression  1. Dizziness w/ unwitnessed fall. Wonder about an orthostatic episode, maybe secondary to anemia. She has had some fluctuating encephalopathy, for which the cause is not clear at the moment. Currently not encephalopathic.       It is reasonable to consider the possibly of seizure related to her clinical picture in general (initially and ongoing), as she does have an old infarct that could plausibly be serving as an epileptogenic focus.       EEG w/ nothing epileptiform or any recorded seizures, w/ slowing noted in area of previous stroke. B12 and folate low. Recommendations  1. Will start low dose Keppra, 250 mg BID. 2.  Will begin folic acid 4 mg daily. 3.  Will supplement B12, 1000 mcg PO daily. 4.  GI to see. Will defer antiplatelet regimen in acute timeframe to primary/GI. 5.  Delirium precautions.       Maximino Flynn NP  [address] [place] [address] [address] Neurology    A copy of this note was provided for Dr Susan Saavedra MD

## 2020-01-20 ENCOUNTER — ANESTHESIA EVENT (OUTPATIENT)
Dept: ENDOSCOPY | Age: 75
DRG: 386 | End: 2020-01-20
Payer: MEDICARE

## 2020-01-20 ENCOUNTER — ANESTHESIA (OUTPATIENT)
Dept: ENDOSCOPY | Age: 75
DRG: 386 | End: 2020-01-20
Payer: MEDICARE

## 2020-01-20 VITALS
SYSTOLIC BLOOD PRESSURE: 98 MMHG | DIASTOLIC BLOOD PRESSURE: 56 MMHG | OXYGEN SATURATION: 92 % | RESPIRATION RATE: 8 BRPM

## 2020-01-20 LAB
ANION GAP SERPL CALCULATED.3IONS-SCNC: 12 MMOL/L (ref 3–16)
ANISOCYTOSIS: ABNORMAL
BASOPHILS ABSOLUTE: 0 K/UL (ref 0–0.2)
BASOPHILS ABSOLUTE: 0.1 K/UL (ref 0–0.2)
BASOPHILS RELATIVE PERCENT: 0.4 %
BASOPHILS RELATIVE PERCENT: 0.7 %
BUN BLDV-MCNC: 12 MG/DL (ref 7–20)
C DIFF TOXIN/ANTIGEN: NORMAL
CALCIUM SERPL-MCNC: 8.3 MG/DL (ref 8.3–10.6)
CHLORIDE BLD-SCNC: 109 MMOL/L (ref 99–110)
CO2: 22 MMOL/L (ref 21–32)
CREAT SERPL-MCNC: 0.6 MG/DL (ref 0.6–1.2)
EOSINOPHILS ABSOLUTE: 0.4 K/UL (ref 0–0.6)
EOSINOPHILS ABSOLUTE: 0.4 K/UL (ref 0–0.6)
EOSINOPHILS RELATIVE PERCENT: 4.8 %
EOSINOPHILS RELATIVE PERCENT: 5.3 %
GFR AFRICAN AMERICAN: >60
GFR NON-AFRICAN AMERICAN: >60
GLUCOSE BLD-MCNC: 89 MG/DL (ref 70–99)
HCT VFR BLD CALC: 26.1 % (ref 36–48)
HCT VFR BLD CALC: 27.4 % (ref 36–48)
HCT VFR BLD CALC: 27.7 % (ref 36–48)
HEMATOLOGY PATH CONSULT: NO
HEMOGLOBIN: 8.1 G/DL (ref 12–16)
HEMOGLOBIN: 8.5 G/DL (ref 12–16)
HEMOGLOBIN: 8.6 G/DL (ref 12–16)
HYPOCHROMIA: ABNORMAL
LYMPHOCYTES ABSOLUTE: 2.1 K/UL (ref 1–5.1)
LYMPHOCYTES ABSOLUTE: 2.1 K/UL (ref 1–5.1)
LYMPHOCYTES RELATIVE PERCENT: 24.1 %
LYMPHOCYTES RELATIVE PERCENT: 25.6 %
MAGNESIUM: 2 MG/DL (ref 1.8–2.4)
MCH RBC QN AUTO: 21.1 PG (ref 26–34)
MCH RBC QN AUTO: 21.1 PG (ref 26–34)
MCH RBC QN AUTO: 21.2 PG (ref 26–34)
MCHC RBC AUTO-ENTMCNC: 30.9 G/DL (ref 31–36)
MCHC RBC AUTO-ENTMCNC: 31 G/DL (ref 31–36)
MCHC RBC AUTO-ENTMCNC: 31 G/DL (ref 31–36)
MCV RBC AUTO: 68.1 FL (ref 80–100)
MCV RBC AUTO: 68.3 FL (ref 80–100)
MCV RBC AUTO: 68.4 FL (ref 80–100)
MICROCYTES: ABNORMAL
MONOCYTES ABSOLUTE: 0.7 K/UL (ref 0–1.3)
MONOCYTES ABSOLUTE: 0.8 K/UL (ref 0–1.3)
MONOCYTES RELATIVE PERCENT: 9.1 %
MONOCYTES RELATIVE PERCENT: 9.9 %
NEUTROPHILS ABSOLUTE: 4.9 K/UL (ref 1.7–7.7)
NEUTROPHILS ABSOLUTE: 5.2 K/UL (ref 1.7–7.7)
NEUTROPHILS RELATIVE PERCENT: 59.6 %
NEUTROPHILS RELATIVE PERCENT: 60.5 %
OVALOCYTES: ABNORMAL
PDW BLD-RTO: 24.9 % (ref 12.4–15.4)
PDW BLD-RTO: 25.1 % (ref 12.4–15.4)
PDW BLD-RTO: 25.2 % (ref 12.4–15.4)
PHOSPHORUS: 3.7 MG/DL (ref 2.5–4.9)
PLATELET # BLD: 324 K/UL (ref 135–450)
PLATELET # BLD: 391 K/UL (ref 135–450)
PLATELET # BLD: 400 K/UL (ref 135–450)
PLATELET SLIDE REVIEW: ADEQUATE
PMV BLD AUTO: 6.3 FL (ref 5–10.5)
PMV BLD AUTO: 6.5 FL (ref 5–10.5)
PMV BLD AUTO: 8.2 FL (ref 5–10.5)
POIKILOCYTES: ABNORMAL
POTASSIUM SERPL-SCNC: 3.6 MMOL/L (ref 3.5–5.1)
RBC # BLD: 3.81 M/UL (ref 4–5.2)
RBC # BLD: 4.02 M/UL (ref 4–5.2)
RBC # BLD: 4.06 M/UL (ref 4–5.2)
SLIDE REVIEW: ABNORMAL
SODIUM BLD-SCNC: 143 MMOL/L (ref 136–145)
TEAR DROP CELLS: ABNORMAL
WBC # BLD: 8.2 K/UL (ref 4–11)
WBC # BLD: 8.2 K/UL (ref 4–11)
WBC # BLD: 8.5 K/UL (ref 4–11)

## 2020-01-20 PROCEDURE — 6360000002 HC RX W HCPCS: Performed by: HOSPITALIST

## 2020-01-20 PROCEDURE — 88342 IMHCHEM/IMCYTCHM 1ST ANTB: CPT

## 2020-01-20 PROCEDURE — 7100000001 HC PACU RECOVERY - ADDTL 15 MIN: Performed by: INTERNAL MEDICINE

## 2020-01-20 PROCEDURE — 6370000000 HC RX 637 (ALT 250 FOR IP): Performed by: INTERNAL MEDICINE

## 2020-01-20 PROCEDURE — 0DB98ZX EXCISION OF DUODENUM, VIA NATURAL OR ARTIFICIAL OPENING ENDOSCOPIC, DIAGNOSTIC: ICD-10-PCS | Performed by: INTERNAL MEDICINE

## 2020-01-20 PROCEDURE — 2580000003 HC RX 258: Performed by: INTERNAL MEDICINE

## 2020-01-20 PROCEDURE — 0DBH8ZX EXCISION OF CECUM, VIA NATURAL OR ARTIFICIAL OPENING ENDOSCOPIC, DIAGNOSTIC: ICD-10-PCS | Performed by: INTERNAL MEDICINE

## 2020-01-20 PROCEDURE — 87324 CLOSTRIDIUM AG IA: CPT

## 2020-01-20 PROCEDURE — 83735 ASSAY OF MAGNESIUM: CPT

## 2020-01-20 PROCEDURE — 2580000003 HC RX 258: Performed by: FAMILY MEDICINE

## 2020-01-20 PROCEDURE — 3700000000 HC ANESTHESIA ATTENDED CARE: Performed by: INTERNAL MEDICINE

## 2020-01-20 PROCEDURE — 6370000000 HC RX 637 (ALT 250 FOR IP): Performed by: HOSPITALIST

## 2020-01-20 PROCEDURE — 0DBF8ZX EXCISION OF RIGHT LARGE INTESTINE, VIA NATURAL OR ARTIFICIAL OPENING ENDOSCOPIC, DIAGNOSTIC: ICD-10-PCS | Performed by: INTERNAL MEDICINE

## 2020-01-20 PROCEDURE — C9113 INJ PANTOPRAZOLE SODIUM, VIA: HCPCS | Performed by: INTERNAL MEDICINE

## 2020-01-20 PROCEDURE — 3609010300 HC COLONOSCOPY W/BIOPSY SINGLE/MULTIPLE: Performed by: INTERNAL MEDICINE

## 2020-01-20 PROCEDURE — 36415 COLL VENOUS BLD VENIPUNCTURE: CPT

## 2020-01-20 PROCEDURE — 87505 NFCT AGENT DETECTION GI: CPT

## 2020-01-20 PROCEDURE — 3609012400 HC EGD TRANSORAL BIOPSY SINGLE/MULTIPLE: Performed by: INTERNAL MEDICINE

## 2020-01-20 PROCEDURE — C9113 INJ PANTOPRAZOLE SODIUM, VIA: HCPCS | Performed by: HOSPITALIST

## 2020-01-20 PROCEDURE — 7100000000 HC PACU RECOVERY - FIRST 15 MIN: Performed by: INTERNAL MEDICINE

## 2020-01-20 PROCEDURE — 85025 COMPLETE CBC W/AUTO DIFF WBC: CPT

## 2020-01-20 PROCEDURE — 3700000001 HC ADD 15 MINUTES (ANESTHESIA): Performed by: INTERNAL MEDICINE

## 2020-01-20 PROCEDURE — 84100 ASSAY OF PHOSPHORUS: CPT

## 2020-01-20 PROCEDURE — 6370000000 HC RX 637 (ALT 250 FOR IP): Performed by: NURSE PRACTITIONER

## 2020-01-20 PROCEDURE — 87449 NOS EACH ORGANISM AG IA: CPT

## 2020-01-20 PROCEDURE — 6360000002 HC RX W HCPCS: Performed by: INTERNAL MEDICINE

## 2020-01-20 PROCEDURE — 88305 TISSUE EXAM BY PATHOLOGIST: CPT

## 2020-01-20 PROCEDURE — 0DB68ZX EXCISION OF STOMACH, VIA NATURAL OR ARTIFICIAL OPENING ENDOSCOPIC, DIAGNOSTIC: ICD-10-PCS | Performed by: INTERNAL MEDICINE

## 2020-01-20 PROCEDURE — 1200000000 HC SEMI PRIVATE

## 2020-01-20 PROCEDURE — 94760 N-INVAS EAR/PLS OXIMETRY 1: CPT

## 2020-01-20 PROCEDURE — 6360000002 HC RX W HCPCS: Performed by: NURSE ANESTHETIST, CERTIFIED REGISTERED

## 2020-01-20 PROCEDURE — 2709999900 HC NON-CHARGEABLE SUPPLY: Performed by: INTERNAL MEDICINE

## 2020-01-20 PROCEDURE — 2500000003 HC RX 250 WO HCPCS: Performed by: NURSE ANESTHETIST, CERTIFIED REGISTERED

## 2020-01-20 PROCEDURE — 80048 BASIC METABOLIC PNL TOTAL CA: CPT

## 2020-01-20 RX ORDER — PHENYLEPHRINE HCL IN 0.9% NACL 1 MG/10 ML
SYRINGE (ML) INTRAVENOUS PRN
Status: DISCONTINUED | OUTPATIENT
Start: 2020-01-20 | End: 2020-01-20 | Stop reason: SDUPTHER

## 2020-01-20 RX ORDER — LIDOCAINE HYDROCHLORIDE 20 MG/ML
INJECTION, SOLUTION EPIDURAL; INFILTRATION; INTRACAUDAL; PERINEURAL PRN
Status: DISCONTINUED | OUTPATIENT
Start: 2020-01-20 | End: 2020-01-20 | Stop reason: SDUPTHER

## 2020-01-20 RX ORDER — METHYLPREDNISOLONE SODIUM SUCCINATE 40 MG/ML
40 INJECTION, POWDER, LYOPHILIZED, FOR SOLUTION INTRAMUSCULAR; INTRAVENOUS EVERY 6 HOURS SCHEDULED
Status: DISCONTINUED | OUTPATIENT
Start: 2020-01-20 | End: 2020-01-21

## 2020-01-20 RX ORDER — PROPOFOL 10 MG/ML
INJECTION, EMULSION INTRAVENOUS PRN
Status: DISCONTINUED | OUTPATIENT
Start: 2020-01-20 | End: 2020-01-20 | Stop reason: SDUPTHER

## 2020-01-20 RX ORDER — PROPOFOL 10 MG/ML
INJECTION, EMULSION INTRAVENOUS CONTINUOUS PRN
Status: DISCONTINUED | OUTPATIENT
Start: 2020-01-20 | End: 2020-01-20 | Stop reason: SDUPTHER

## 2020-01-20 RX ADMIN — CYANOCOBALAMIN TAB 1000 MCG 1000 MCG: 1000 TAB at 10:57

## 2020-01-20 RX ADMIN — IRON SUCROSE 200 MG: 20 INJECTION, SOLUTION INTRAVENOUS at 10:57

## 2020-01-20 RX ADMIN — FOLIC ACID 4 MG: 1 TABLET ORAL at 10:57

## 2020-01-20 RX ADMIN — METHYLPREDNISOLONE SODIUM SUCCINATE 40 MG: 40 INJECTION, POWDER, FOR SOLUTION INTRAMUSCULAR; INTRAVENOUS at 21:08

## 2020-01-20 RX ADMIN — Medication 100 MCG: at 14:16

## 2020-01-20 RX ADMIN — LIDOCAINE HYDROCHLORIDE 60 MG: 20 INJECTION, SOLUTION EPIDURAL; INFILTRATION; INTRACAUDAL; PERINEURAL at 14:08

## 2020-01-20 RX ADMIN — PROPOFOL 140 MCG/KG/MIN: 10 INJECTION, EMULSION INTRAVENOUS at 14:08

## 2020-01-20 RX ADMIN — SODIUM CHLORIDE, PRESERVATIVE FREE 10 ML: 5 INJECTION INTRAVENOUS at 21:08

## 2020-01-20 RX ADMIN — METHYLPREDNISOLONE SODIUM SUCCINATE 40 MG: 40 INJECTION, POWDER, FOR SOLUTION INTRAMUSCULAR; INTRAVENOUS at 17:18

## 2020-01-20 RX ADMIN — PROPOFOL 60 MG: 10 INJECTION, EMULSION INTRAVENOUS at 14:08

## 2020-01-20 RX ADMIN — LISINOPRIL 20 MG: 20 TABLET ORAL at 10:57

## 2020-01-20 RX ADMIN — ATORVASTATIN CALCIUM 40 MG: 40 TABLET, FILM COATED ORAL at 21:07

## 2020-01-20 RX ADMIN — SODIUM CHLORIDE: 9 INJECTION, SOLUTION INTRAVENOUS at 21:53

## 2020-01-20 RX ADMIN — Medication 100 MCG: at 14:20

## 2020-01-20 RX ADMIN — PANTOPRAZOLE SODIUM 40 MG: 40 INJECTION, POWDER, FOR SOLUTION INTRAVENOUS at 10:58

## 2020-01-20 RX ADMIN — LEVETIRACETAM 250 MG: 250 TABLET ORAL at 21:07

## 2020-01-20 RX ADMIN — PANTOPRAZOLE SODIUM 40 MG: 40 INJECTION, POWDER, FOR SOLUTION INTRAVENOUS at 21:08

## 2020-01-20 RX ADMIN — PROPOFOL 40 MG: 10 INJECTION, EMULSION INTRAVENOUS at 14:11

## 2020-01-20 RX ADMIN — SODIUM CHLORIDE: 9 INJECTION, SOLUTION INTRAVENOUS at 04:18

## 2020-01-20 RX ADMIN — METOPROLOL SUCCINATE 25 MG: 25 TABLET, EXTENDED RELEASE ORAL at 10:57

## 2020-01-20 RX ADMIN — LEVETIRACETAM 250 MG: 250 TABLET ORAL at 10:57

## 2020-01-20 ASSESSMENT — PULMONARY FUNCTION TESTS
PIF_VALUE: 0
PIF_VALUE: 0
PIF_VALUE: 2
PIF_VALUE: 0
PIF_VALUE: 44
PIF_VALUE: 0

## 2020-01-20 ASSESSMENT — PAIN - FUNCTIONAL ASSESSMENT: PAIN_FUNCTIONAL_ASSESSMENT: 0-10

## 2020-01-20 ASSESSMENT — PAIN SCALES - GENERAL
PAINLEVEL_OUTOF10: 0

## 2020-01-20 ASSESSMENT — LIFESTYLE VARIABLES: SMOKING_STATUS: 0

## 2020-01-20 NOTE — ANESTHESIA POSTPROCEDURE EVALUATION
Department of Anesthesiology  Postprocedure Note    Patient: Audi Lara  MRN: [id number]  YOB: 1945  Date of evaluation: 1/20/2020  Time:  3:33 PM     Procedure Summary     Date:  01/20/20 Room / Location:  [address] [place] [address]    Anesthesia Start:  0916 Anesthesia Stop:  5069    Procedures:       EGD BIOPSY (N/A )      COLONOSCOPY WITH BIOPSY (N/A ) Diagnosis:  (anemia)    Surgeon:  Ernie Good MD Responsible Provider:  Javier Zacarias MD    Anesthesia Type:  MAC ASA Status:  3          Anesthesia Type: MAC    Falguni Phase I: Falguni Score: 8    Falguni Phase II:      Last vitals: Reviewed and per EMR flowsheets.        Anesthesia Post Evaluation    Patient location during evaluation: PACU  Patient participation: complete - patient participated  Level of consciousness: awake and alert  Pain score: 0  Airway patency: patent  Nausea & Vomiting: no nausea and no vomiting  Complications: no  Cardiovascular status: blood pressure returned to baseline  Respiratory status: acceptable  Hydration status: euvolemic

## 2020-01-20 NOTE — CARE COORDINATION
SW left messages for Mirliz, patient's children, regarding SNF recommendations. SW to leave a patient choice list in the room today and will follow up again later. Respectfully submitted,    TARA Ma  [facility]   [phone]    Electronically signed by TARA Oconnell on 1/20/2020 at 12:48 PM

## 2020-01-20 NOTE — PLAN OF CARE
Problem: Pain:  Goal: Pain level will decrease  Description  Pain level will decrease  1/20/2020 1318 by Laureano Aguilar RN  Outcome: Ongoing  1/19/2020 2330 by Maryann Cook RN  Outcome: Ongoing  Goal: Control of acute pain  Description  Control of acute pain  1/20/2020 1318 by Laureano Aguilar RN  Outcome: Ongoing  1/19/2020 2330 by Maryann Cook RN  Outcome: Ongoing  Goal: Control of chronic pain  Description  Control of chronic pain  1/20/2020 1318 by Laureano Aguilar RN  Outcome: Ongoing  1/19/2020 2330 by Maryann Cook RN  Outcome: Ongoing     Problem: Falls - Risk of:  Goal: Will remain free from falls  Description  Will remain free from falls  1/20/2020 1318 by Laureano Aguilar RN  Outcome: Ongoing  1/19/2020 2330 by Maryann Cook RN  Outcome: Met This Shift  Goal: Absence of physical injury  Description  Absence of physical injury  1/20/2020 1318 by Laureano Aguilar RN  Outcome: Ongoing  1/19/2020 2330 by Maryann Cook RN  Outcome: Met This Shift     Problem: Discharge Planning:  Goal: Discharged to appropriate level of care  Description  Discharged to appropriate level of care  1/20/2020 1318 by Laureano Aguilar RN  Outcome: Ongoing  1/19/2020 2330 by Maryann Cook RN  Outcome: Ongoing     Problem: Restraint Use - Nonviolent/Non-Self-Destructive Behavior:  Goal: Absence of restraint indications  Description  Absence of restraint indications  1/20/2020 1318 by Laureano Aguilar RN  Outcome: Ongoing  1/19/2020 2330 by Maryann Cook RN  Outcome: Ongoing  Goal: Absence of restraint-related injury  Description  Absence of restraint-related injury  1/20/2020 1318 by Laureano Aguilar RN  Outcome: Ongoing  1/19/2020 2330 by Maryann Cook RN  Outcome: Ongoing

## 2020-01-20 NOTE — OP NOTE
were obtained. Findings:    Esophagus: The esophagus appeared normal without evidence of Larry's esophagus or reflux esophagitis. There was a 5cm haital hernia seen. Stomach: The stomach showed erosive gastritis of the antrum. Biopsies were obtained from the antrum and body of the stomach to rule out active gastritis and H.pylori gastritis. Retroflexion revealed the hiatal hernia. Duodenum: The first and 2nd portions of the duodenum appeared normal with normal villous pattern. Cold forceps biopsies performed to rule out celiac disease. The scope was then withdrawn back into the stomach, it was decompressed, and the scope was completely withdrawn. A digital rectal examination was performed and revealed negative without mass, lesions or tenderness. The Olympus video colonoscope was placed in the patient's rectum under digital direction and advanced to the cecum. The cecum was identified by characteristic anatomy and ballottment. The prep was good. The ileocecal valve was identified. The terminal ileum was not intubated. The scope was then withdrawn back through the cecum, ascending, transverse, descending, sigmoid colon, and rectum. Careful circumferential examination of the mucosa in these areas demonstrated:   rmed to rule out celiac disease. 1) Moderate colitis with erythema, edema, and ulcerations from the rectum to the hepatic flexure. Cold forceps biopsies were obtained and stool studies collected. 2) Normal colonic mucosa in the right colon and cecum. Cold forceps biopsies were performed. The scope was then withdrawn into the rectum and retroflexed. The retroflexed view of the anal verge and rectum demonstrates no abnormalities. The scope was straightened, the colon was decompressed and the scope was withdrawn from the patient. The patient tolerated the procedure well and was taken to the PACU in good condition.     Estimated blood loss:  None    Specimens obtained: Yes    Impression:    1) 5cm hiatal hernia. 2) Erosive gastritis of the antrum. Biopsies were obtained from the antrum and body of the stomach to rule out active gastritis and H.pylori gastritis. 3) Normal duodenum. Cold forceps biopsies were performed to rule out celiac disease. 4) Moderate colitis with erythema, edema, and ulcerations from the rectum to the hepatic flexure. Cold forceps biopsies were obtained and stool studies collected. 5) Normal colonic mucosa in the right colon and cecum. Cold forceps biopsies were performed. Recommendations:   1)  The patient had biopsies taken today. The patient should call for results in 7 days if they have not heard from our office. Our number is [phone].  2)  Continue Protonix 40mg twice daily. 3)  I will start her on IV Solumedrol 40mg IV q6 for active ulcerative colitis which is likely the cause of her ongoing anemia. 4)  Low fiber diet. 5)  Follow CBC daily.      CANDICE Razo [address] [facility]

## 2020-01-20 NOTE — H&P
present    Pre-Procedure Assessment / Plan:  1) EGD, flexible sigmoidoscopy    ASA Grade:  ASA 3 - Patient with moderate systemic disease with functional limitations  Mallampati Classification:  Class II    Level of Sedation Plan: Moderate sedation    Post Procedure plan: Return to same level of care    I assessed the patient and find that the patient is in satisfactory condition to proceed with the planned procedure and sedation plan. I have explained the risk, benefits, and alternatives to the procedure; the patient understands and agrees to proceed.        William Norman MD  1/20/2020
injection. ENT: No discharge. Pharynx clear. External appearance of ears and nose normal.  Neck: Trachea midline. No obvious mass. Resp: No accessory muscle use. No crackles. No wheezes. No rhonchi. CV: Regular rate. Regular rhythm. No murmur or rub. No edema. GI: Non-tender. Non-distended. No hernia. Skin: Warm, dry, normal texture and turgor. Lymph: No cervical LAD. No supraclavicular LAD. M/S: / Ext. No cyanosis. No clubbing. No joint deformity. Neuro: Moves all four extremities. CN 2-12 tested, no deficits noted. Peripheral pulses and capillary refill is intact. CBC:   Recent Labs     01/16/20  1441   WBC 10.7   HGB 10.4*        BMP:    Recent Labs     01/16/20  1441   *   K 4.1      CO2 22   BUN 17   CREATININE 0.8   GLUCOSE 93     Hepatic:   Recent Labs     01/16/20  1441   AST 15   ALT 10   BILITOT 0.3   ALKPHOS 124     Troponin:   Recent Labs     01/16/20  1441   TROPONINI <0.01     BNP: No results for input(s): BNP in the last 72 hours. INR: No results for input(s): INR in the last 72 hours. No results found for: LABA1C        No results for input(s): CKTOTAL in the last 72 hours. -----------------------------------------------------------------  CT scan of the head  No acute intracranial abnormalities  Right parieto-occipital region infarct new since 2018  Stable chronic ischemic changes    Assessment / Plan     syncope R55  Check for a postural hypotension  IV fluids  Monitor on telemetry  CT scan of the head is negative  Carotid Doppler  Consult to neurology      Coronary artery disease  Continue on aspirin and Plavix and beta-blockers        DVT and GI prophylaxis      Full Code      Rosemary Boyer M.D    This note was transcribed using [id number] [employer]. Please disregard any translational errors.

## 2020-01-20 NOTE — CARE COORDINATION
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 SW informed that the current plan was for the patient to go to a rehab facility but they need to pick one so we can start the process. SW informed that there was a list in the room they could review and give us a few facilities to start with. SW informed that this will not be a fast process because we will need pre-certfication from insurance. SW asked him to speak with his sister so they could discuss the SNF list because the decision of where to treat can't be ours to make. He stated that they would look at it. Respectfully submitted,    TARA Hernández  [facility]   [phone]    Electronically signed by TARA Chandler on 1/20/2020 at 4:13 PM

## 2020-01-20 NOTE — PROGRESS NOTES
(EFFER-K) effervescent tablet 40 mEq, 40 mEq, Oral, PRN **OR** potassium chloride 10 mEq/100 mL IVPB (Peripheral Line), 10 mEq, Intravenous, PRN, Yi Sharma MD    magnesium hydroxide (MILK OF MAGNESIA) 400 MG/5ML suspension 30 mL, 30 mL, Oral, Daily PRN, Yi Sharma MD    ondansetron [facility]) injection 4 mg, 4 mg, Intravenous, Q6H PRN, Yi Sharma MD, 4 mg at 01/17/20 2025    Exam  Blood pressure 132/85, pulse 85, temperature 97.7 °F (36.5 °C), temperature source Oral, resp. rate 20, height 4' 10\" (1.473 m), weight 142 lb 6.7 oz (64.6 kg), SpO2 94 %, not currently breastfeeding. Constitutional                          Vital signs: BP, HR, and RR reviewed            General alert, no distress, well-nourished  Eyes: unable to visualize the fundi  Cardiovascular: pulses symmetric in all 4 extremities. Psychiatric: cooperative with examination, no psychotic behavior noted. Neurologic  Mental status:   orientation to person, place, year. Able to identify objects correctly, identify current [place] president.    Glendy Soto intact as able to attend well to the exam                Language fluent in conversation. No aphasia.                 Comprehension intact; follows simple commands  Cranial nerves:   CN2: visual fields full   CN 3,4,6: extraocular muscles intact. Pupils are equal, round, reactive bilaterally.    CN7: face symmetric without dysarthria  CN8: hard of hearing  CN12: tongue midline with protrusion  Strength: no gross focal paresis.    Sensory: light touch intact in all 4 extremities. Cerebellar/coordination: finger nose finger normal without any overt ataxia  Tone: normal in all 4 extremities  Gait: deferred at this time for safety. ROS  Constitutional- No weight loss or fevers  Eyes- No diplopia. No photophobia. Ears/nose/throat- No dysphagia. No Dysarthria  Cardiovascular- No palpitations. No chest pain  Respiratory- No dyspnea.  No Cough  Gastrointestinal- No

## 2020-01-20 NOTE — PROGRESS NOTES
To pacu from endo. Pt asleep. Wakes briefly. Pt very [place]. Abd rounded but soft. IV infusing. Monitor in a paced rhythm.

## 2020-01-20 NOTE — ANESTHESIA PRE PROCEDURE
tablet Take 40 mg by mouth daily.        Current Facility-Administered Medications   Medication Dose Route Frequency Provider Last Rate Last Dose    levETIRAcetam (KEPPRA) tablet 250 mg  250 mg Oral BID ROBERT Colon CNP   250 mg at 84/86/49 6577    folic acid (FOLVITE) tablet 4 mg  4 mg Oral Daily ROBERT Colon CNP   4 mg at 01/20/20 1057    vitamin B-12 (CYANOCOBALAMIN) tablet 1,000 mcg  1,000 mcg Oral Daily ROBERT Colon CNP   1,000 mcg at 01/20/20 1057    sodium chloride flush 0.9 % injection 10 mL  10 mL Intravenous 2 times per day Ita Horta MD   10 mL at 01/19/20 2129    sodium chloride flush 0.9 % injection 10 mL  10 mL Intravenous PRN Ita Horta MD        iron sucrose (VENOFER) injection 200 mg  200 mg Intravenous Daily Ita Horta MD   200 mg at 01/20/20 1057    pantoprazole (PROTONIX) injection 40 mg  40 mg Intravenous BID Paulo Esposito MD   40 mg at 01/20/20 1058    haloperidol lactate (HALDOL) injection 1 mg  1 mg Intramuscular Q4H PRN Paulo Esposito MD   1 mg at 01/17/20 2245    atorvastatin (LIPITOR) tablet 40 mg  40 mg Oral Nightly Yue Moncada MD   40 mg at 01/19/20 2129    lisinopril (PRINIVIL;ZESTRIL) tablet 20 mg  20 mg Oral Daily Yue Moncada MD   20 mg at 01/20/20 1057    metoprolol succinate (TOPROL XL) extended release tablet 25 mg  25 mg Oral Daily Yue Moncada MD   25 mg at 01/20/20 1057    0.9 % sodium chloride infusion   Intravenous Continuous Mynor Estevez MD 75 mL/hr at 01/20/20 0418      potassium chloride (KLOR-CON M) extended release tablet 40 mEq  40 mEq Oral PRN Yue Moncada MD   40 mEq at 01/18/20 3340    Or    potassium bicarb-citric acid (EFFER-K) effervescent tablet 40 mEq  40 mEq Oral PRN Yue Moncada MD        Or    potassium chloride 10 mEq/100 mL IVPB (Peripheral Line)  10 mEq Intravenous PRN Yue Moncada MD        magnesium hydroxide (MILK OF hyperlipidemia        Rhythm: regular  Rate: normal                    Neuro/Psych:   (+) headaches:,             GI/Hepatic/Renal:   (+) GERD:, PUD, bowel prep,           Endo/Other:    (+) blood dyscrasia::., .                 Abdominal:           Vascular:                                      Anesthesia Plan      MAC     ASA 3       Induction: intravenous. Anesthetic plan and risks discussed with patient. Plan discussed with CRNA. This pre-anesthesia assessment may be used as a history and physical.    DOS STAFF ADDENDUM:    Pt seen and examined, chart reviewed (including anesthesia, drug and allergy history). No interval changes to history and physical examination. Anesthetic plan, risks, benefits, alternatives, and personnel involved discussed with patient. Patient verbalized an understanding and agrees to proceed.       Audie Lynch MD  January 20, 2020  1:21 PM

## 2020-01-20 NOTE — PROGRESS NOTES
present    Pre-Procedure Assessment / Plan:  1) EGD, flexible sigmoidoscopy    ASA Grade:  ASA 3 - Patient with moderate systemic disease with functional limitations  Mallampati Classification:  Class II    Level of Sedation Plan: Moderate sedation    Post Procedure plan: Return to same level of care    I assessed the patient and find that the patient is in satisfactory condition to proceed with the planned procedure and sedation plan. I have explained the risk, benefits, and alternatives to the procedure; the patient understands and agrees to proceed.        Bobby Harmon MD  1/20/2020

## 2020-01-20 NOTE — PLAN OF CARE
Problem: Falls - Risk of:  Goal: Will remain free from falls  Description  Will remain free from falls  1/19/2020 2330 by Efrain Craft RN  Outcome: Met This Shift     Problem: Falls - Risk of:  Goal: Absence of physical injury  Description  Absence of physical injury  1/19/2020 2330 by Efrain Craft RN  Outcome: Met This Shift     Problem: Pain:  Goal: Pain level will decrease  Description  Pain level will decrease  1/19/2020 2330 by Efrain Craft RN  Outcome: Ongoing     Problem: Pain:  Goal: Control of acute pain  Description  Control of acute pain  1/19/2020 2330 by Efrain Craft RN  Outcome: Ongoing     Problem: Pain:  Goal: Control of chronic pain  Description  Control of chronic pain  1/19/2020 2330 by Efrain Craft RN  Outcome: Ongoing     Problem: Discharge Planning:  Goal: Discharged to appropriate level of care  Description  Discharged to appropriate level of care  1/19/2020 2330 by Efrain Craft RN  Outcome: Ongoing     Problem: Restraint Use - Nonviolent/Non-Self-Destructive Behavior:  Goal: Absence of restraint indications  Description  Absence of restraint indications  1/19/2020 2330 by Efrain Craft RN  Outcome: Ongoing     Problem: Restraint Use - Nonviolent/Non-Self-Destructive Behavior:  Goal: Absence of restraint-related injury  Description  Absence of restraint-related injury  1/19/2020 2330 by Efrain Craft RN  Outcome: Ongoing

## 2020-01-21 LAB
ANION GAP SERPL CALCULATED.3IONS-SCNC: 12 MMOL/L (ref 3–16)
BASOPHILS ABSOLUTE: 0 K/UL (ref 0–0.2)
BASOPHILS RELATIVE PERCENT: 0.1 %
BLOOD BANK DISPENSE STATUS: NORMAL
BLOOD BANK DISPENSE STATUS: NORMAL
BLOOD BANK PRODUCT CODE: NORMAL
BLOOD BANK PRODUCT CODE: NORMAL
BPU ID: NORMAL
BPU ID: NORMAL
BUN BLDV-MCNC: 12 MG/DL (ref 7–20)
CALCIUM SERPL-MCNC: 8.9 MG/DL (ref 8.3–10.6)
CHLORIDE BLD-SCNC: 109 MMOL/L (ref 99–110)
CO2: 20 MMOL/L (ref 21–32)
CREAT SERPL-MCNC: 0.6 MG/DL (ref 0.6–1.2)
DESCRIPTION BLOOD BANK: NORMAL
DESCRIPTION BLOOD BANK: NORMAL
EOSINOPHILS ABSOLUTE: 0 K/UL (ref 0–0.6)
EOSINOPHILS RELATIVE PERCENT: 0.1 %
GFR AFRICAN AMERICAN: >60
GFR NON-AFRICAN AMERICAN: >60
GI BACTERIAL PATHOGENS BY PCR: NORMAL
GLUCOSE BLD-MCNC: 143 MG/DL (ref 70–99)
HBV SURFACE AB TITR SER: <3.5 MIU/ML
HCT VFR BLD CALC: 27.6 % (ref 36–48)
HEMATOLOGY PATH CONSULT: NO
HEMOGLOBIN: 8.6 G/DL (ref 12–16)
HEPATITIS B CORE IGM ANTIBODY: NORMAL
LYMPHOCYTES ABSOLUTE: 1 K/UL (ref 1–5.1)
LYMPHOCYTES RELATIVE PERCENT: 12.7 %
MCH RBC QN AUTO: 21.3 PG (ref 26–34)
MCHC RBC AUTO-ENTMCNC: 31.1 G/DL (ref 31–36)
MCV RBC AUTO: 68.6 FL (ref 80–100)
MONOCYTES ABSOLUTE: 0.1 K/UL (ref 0–1.3)
MONOCYTES RELATIVE PERCENT: 1.4 %
NEUTROPHILS ABSOLUTE: 6.7 K/UL (ref 1.7–7.7)
NEUTROPHILS RELATIVE PERCENT: 85.7 %
PDW BLD-RTO: 24.5 % (ref 12.4–15.4)
PLATELET # BLD: 475 K/UL (ref 135–450)
PMV BLD AUTO: 6.7 FL (ref 5–10.5)
POTASSIUM SERPL-SCNC: 4.6 MMOL/L (ref 3.5–5.1)
RBC # BLD: 4.03 M/UL (ref 4–5.2)
SODIUM BLD-SCNC: 141 MMOL/L (ref 136–145)
WBC # BLD: 7.8 K/UL (ref 4–11)

## 2020-01-21 PROCEDURE — 97116 GAIT TRAINING THERAPY: CPT

## 2020-01-21 PROCEDURE — 2500000003 HC RX 250 WO HCPCS: Performed by: PHYSICIAN ASSISTANT

## 2020-01-21 PROCEDURE — 86704 HEP B CORE ANTIBODY TOTAL: CPT

## 2020-01-21 PROCEDURE — 86705 HEP B CORE ANTIBODY IGM: CPT

## 2020-01-21 PROCEDURE — 97530 THERAPEUTIC ACTIVITIES: CPT

## 2020-01-21 PROCEDURE — 97130 THER IVNTJ EA ADDL 15 MIN: CPT

## 2020-01-21 PROCEDURE — 6370000000 HC RX 637 (ALT 250 FOR IP): Performed by: INTERNAL MEDICINE

## 2020-01-21 PROCEDURE — 80048 BASIC METABOLIC PNL TOTAL CA: CPT

## 2020-01-21 PROCEDURE — 6370000000 HC RX 637 (ALT 250 FOR IP): Performed by: PHYSICIAN ASSISTANT

## 2020-01-21 PROCEDURE — 97129 THER IVNTJ 1ST 15 MIN: CPT

## 2020-01-21 PROCEDURE — C9113 INJ PANTOPRAZOLE SODIUM, VIA: HCPCS | Performed by: INTERNAL MEDICINE

## 2020-01-21 PROCEDURE — 6360000002 HC RX W HCPCS: Performed by: INTERNAL MEDICINE

## 2020-01-21 PROCEDURE — 2580000003 HC RX 258: Performed by: INTERNAL MEDICINE

## 2020-01-21 PROCEDURE — 94760 N-INVAS EAR/PLS OXIMETRY 1: CPT

## 2020-01-21 PROCEDURE — 1200000000 HC SEMI PRIVATE

## 2020-01-21 PROCEDURE — 36415 COLL VENOUS BLD VENIPUNCTURE: CPT

## 2020-01-21 PROCEDURE — 97535 SELF CARE MNGMENT TRAINING: CPT

## 2020-01-21 PROCEDURE — 85025 COMPLETE CBC W/AUTO DIFF WBC: CPT

## 2020-01-21 PROCEDURE — 86706 HEP B SURFACE ANTIBODY: CPT

## 2020-01-21 RX ORDER — METHYLPREDNISOLONE SODIUM SUCCINATE 40 MG/ML
20 INJECTION, POWDER, LYOPHILIZED, FOR SOLUTION INTRAMUSCULAR; INTRAVENOUS EVERY 8 HOURS
Status: DISCONTINUED | OUTPATIENT
Start: 2020-01-21 | End: 2020-01-21

## 2020-01-21 RX ORDER — BALSALAZIDE DISODIUM 750 MG/1
2250 CAPSULE ORAL 3 TIMES DAILY
Status: DISCONTINUED | OUTPATIENT
Start: 2020-01-21 | End: 2020-01-25 | Stop reason: HOSPADM

## 2020-01-21 RX ORDER — PREDNISONE 20 MG/1
40 TABLET ORAL DAILY
Status: DISCONTINUED | OUTPATIENT
Start: 2020-01-21 | End: 2020-01-25 | Stop reason: HOSPADM

## 2020-01-21 RX ADMIN — LEVETIRACETAM 250 MG: 250 TABLET ORAL at 08:19

## 2020-01-21 RX ADMIN — TUBERCULIN PURIFIED PROTEIN DERIVATIVE 5 UNITS: 5 INJECTION INTRADERMAL at 12:20

## 2020-01-21 RX ADMIN — BALSALAZIDE DISODIUM 2250 MG: 750 CAPSULE ORAL at 15:00

## 2020-01-21 RX ADMIN — CYANOCOBALAMIN TAB 1000 MCG 1000 MCG: 1000 TAB at 08:19

## 2020-01-21 RX ADMIN — BALSALAZIDE DISODIUM 2250 MG: 750 CAPSULE ORAL at 20:21

## 2020-01-21 RX ADMIN — SODIUM CHLORIDE, PRESERVATIVE FREE 10 ML: 5 INJECTION INTRAVENOUS at 20:21

## 2020-01-21 RX ADMIN — SODIUM CHLORIDE: 9 INJECTION, SOLUTION INTRAVENOUS at 11:24

## 2020-01-21 RX ADMIN — LISINOPRIL 20 MG: 20 TABLET ORAL at 08:19

## 2020-01-21 RX ADMIN — LEVETIRACETAM 250 MG: 250 TABLET ORAL at 20:20

## 2020-01-21 RX ADMIN — METHYLPREDNISOLONE SODIUM SUCCINATE 40 MG: 40 INJECTION, POWDER, FOR SOLUTION INTRAMUSCULAR; INTRAVENOUS at 04:58

## 2020-01-21 RX ADMIN — METOPROLOL SUCCINATE 25 MG: 25 TABLET, EXTENDED RELEASE ORAL at 08:19

## 2020-01-21 RX ADMIN — SODIUM CHLORIDE: 9 INJECTION, SOLUTION INTRAVENOUS at 22:46

## 2020-01-21 RX ADMIN — PANTOPRAZOLE SODIUM 40 MG: 40 INJECTION, POWDER, FOR SOLUTION INTRAVENOUS at 20:20

## 2020-01-21 RX ADMIN — ATORVASTATIN CALCIUM 40 MG: 40 TABLET, FILM COATED ORAL at 20:20

## 2020-01-21 RX ADMIN — PREDNISONE 40 MG: 20 TABLET ORAL at 12:19

## 2020-01-21 RX ADMIN — PANTOPRAZOLE SODIUM 40 MG: 40 INJECTION, POWDER, FOR SOLUTION INTRAVENOUS at 08:19

## 2020-01-21 RX ADMIN — FOLIC ACID 4 MG: 1 TABLET ORAL at 08:19

## 2020-01-21 RX ADMIN — IRON SUCROSE 200 MG: 20 INJECTION, SOLUTION INTRAVENOUS at 08:19

## 2020-01-21 ASSESSMENT — PAIN SCALES - GENERAL
PAINLEVEL_OUTOF10: 0
PAINLEVEL_OUTOF10: 0

## 2020-01-21 NOTE — PROGRESS NOTES
Speech Language Pathology  Facility/Department: [facility]  Speech/Language/Cognitive Daily Treatment Note    NAME: Ramona Elliott  :    MRN: [id number]  ADMISSION DATE: 2020  ADMITTING DIAGNOSIS: has Ankle fracture; CAD (coronary artery disease); Pacemaker; HTN (hypertension); Hypercholesteremia; UC (ulcerative colitis) ([facility] [address] Intractable headache; Change in mental status; and Acute blood loss anemia on their problem list.  DATE ONSET: 2020    Date of Eval: 2020   Treating Therapist: MAHIN Ovalle    RECENT RESULTS  CT OF HEAD/MRI:  2020:     1. No acute intracranial hemorrhage   2. Focal area of encephalomalacia has developed involving the right   parietal-occipital region consistent with remote infarct.  This is new from   prior CT of 2018. 3. Stable chronic ischemic changes involving the periventricular white matter   and the right basal ganglia.         Primary Complaint:       Fall       lives with son. son found her on ground in bathroom. pt unsure of how she fell. denies injury/pain. . a/ox4     Chart Review:CT head negative for any acute intracranial hemorrhage, did reveal focal area of encephalomalacia in the right parieto-occipital region consistent with remote infarct which is new from previous CT in 2018    Pain:  Pain Assessment  Pain Assessment: 0-10  Pain Level: 0    Assessment:  · Resolving trace to minimal cognitive linguistic deficit in areas of higher level attention and complex PS. Pt is oriented x4, able to recall 3 units after 5 min delay IND, and demonstrates functional reasoning and simple PS skills. Pt reports her cognition has significantly cleared since admission. · Speech and language skills are grossly WFLs  · Swallowing function appears intact with 3oz water test.  Pt denies any dysphagia s/s.     Recommendations:  F/u 1-3x to confirm no further ST needs    Plan:   Goals:  Goal 1: Pt will participate in the assessment of writing and higher level PS. ongoing  Goal 2: Pt will be O x 4 with occasional cues. Appears met  Goal 3: Pt will recall strategies learned from PT and OT re: ambulation and ADL's with 80% accuracy and min cues. Ongoing; able to recall 3/3 units after 5 min delay IND  Goal 4: Pt will follow multi-unit directions with 80% accuracy and occasional cues. Appears met in complex conversation  Goal 5: Pt will solve basic problems for ADL's 60% of the time with mod cues. Appears met; may cont x1 for carryover   Goal 6: Pt will initiate questions for the SLP in 3/5 exhcanges with min-mod cues. Goal met: [facility] for initiation and topic maintenance for 20 min duration in structured 1:1 setting    Subjective:  Alert and cooperative in bedside chair; hard of hearing and requires increased volume and repetition at times. Previous level of function and limitations: Pt lived with her son. Objective:  Oral Motor: WFL  Motor Speech: WFL  Auditory Comprhension: WFL  Verbal Expression: WFL: no word finding in complex conversation. Initiates and maintains conversation over 15 min duration  Orientation: oriented x4 IND, with stating date as 19th or 20th  Recall:  IND for 4 lunch items, reason for stay, pertinent diagnoses. Able to recall 3/3 unrelated words 100% IND immediately and after 5 min delay. Problem Solving: abstract reasoning for similarities/differences 3/3 IND; simple PS IND     Prognosis:   excellent    Education:  Discussed goals of care and improvement across all domains with pt, who is in agreement    Therapy Time:   Individual Concurrent Group Co-treatment   Time In 1600         Time Out  1630         Minutes  30 cognition                This note serves as a D/C Summary in the event that this patient is discharged prior to the next therapy session.     Jacqueline Cardenas MS, CCC-SLP #[id number]  Speech Language Pathologist  1/21/2020 4:30 PM

## 2020-01-21 NOTE — PROGRESS NOTES
Other PMHx as noted. Response To Previous Treatment: Patient with no complaints from previous session. Family / Caregiver Present: No  Referring Practitioner: Yolanda Mora MD  Subjective  Subjective: Patient in bed. Awake. Agreeable to therapy. Denies pain. Pain Screening  Patient Currently in Pain: Denies    Orientation  Orientation  Overall Orientation Status: Within Functional Limits     Social/Functional History  Social/Functional History  Lives With: Son  Type of Home: House  Home Layout: One level, Laundry in basement, Able to Live on Main level with bedroom/bathroom(14 step down to basement)  Home Access: Stairs to enter without rails  Entrance Stairs - Number of Steps: 1  Bathroom Shower/Tub: Tub/Shower unit, Shower chair with back  [employer]: Standard  Bathroom Equipment: Grab bars in shower, Grab bars around toilet, Hand-held shower  Bathroom Accessibility: Not accessible(with a walker)  Home Equipment: Grab bars  Receives Help From: Family  ADL Assistance: Independent(per pt)  Homemaking Assistance: (shares with son)  Ambulation Assistance: Independent  Transfer Assistance: Independent  Active : No(son drives)  Mode of Transportation: Family  Additional Comments: Pt is a questionable historian. Most of the social history was copied from earlier stay in  2017. Pt reports she uses the microwave when cooking.     Objective  Bed mobility  Supine to Sit: Supervision(bed flat, no rails, exiting to patient's R side)  Sit to Supine: Unable to assess(in BS chair at end of session)  Transfers  Sit to Stand: Stand by assistance(v cues for hand placement with transfers.)  Stand to sit: Stand by assistance(v cues for hand placement with transfers.)  Ambulation  Ambulation?: Yes  More Ambulation?: Yes  Ambulation 1  Surface: level tile  Device: No Device  Assistance: Contact guard assistance;Stand by assistance  Quality of Gait: Step through pattern with more normalized base of support, but fairly consistent gait path deviation trace L throughout. 1 LOB as she turns head to speak with therapist, requiring Min A to recover. Gait Deviations: Slow [place]  Distance: 60 feet  Ambulation 2  Surface - 2: level tile  Device 2: Rolling Walker  Assistance 2: Stand by assistance  Quality of Gait 2: Step through pattern. Cues to position within wh walker base. Cues to use walker until at end destination vs sitting it aside. Incomplete comprehension/integration. Gait more steady with wh walkeras compared to no device. Distance: 61'  Comments: Patient performs grooming tasks at sink with OT--refer to OT note for details. Stairs/Curb  Stairs?: No  Balance  Posture: Fair  Sitting - Static: Good  Sitting - Dynamic: Good  Standing - Static: Good;-  Standing - Dynamic: Good;-  Comments: gait and stance supported. Plan   Plan  Times per week: 3-5  Plan weeks: 1-2   Current Treatment Recommendations: Strengthening, Transfer Training, Endurance Training, Patient/Caregiver Education & Training, Equipment Evaluation, Education, & procurement, Gait Training, Balance Training, Home Exercise Program, Functional Mobility Training, Stair training, Safety Education & Training  Safety Devices  Type of devices: All fall risk precautions in place, Gait belt, Patient at risk for falls, Nurse notified, Call light within reach, Chair alarm in place, Left in chair, Telesitter in use  Restraints  Initially in place: No    AM-PAC Score  AM-PAC Inpatient Mobility Raw Score : 19 (01/21/20 1528)  AM-PAC Inpatient T-Scale Score : 45.44 (01/21/20 1528)  Mobility Inpatient CMS 0-100% Score: 41.77 (01/21/20 1528)  Mobility Inpatient CMS G-Code Modifier : CK (01/21/20 1528)     Goals  Short term goals  Time Frame for Short term goals: upon discharge  Short term goal 1: Bed mobility Supervision. 1-: goal met  Short term goal 2: Transfers Supervision  Short term goal 3: Ambulate 150 feet without A. D. with Supervision  Short term goal 4: Up/down one stair with CGA  Patient Goals   Patient goals : pt was unable to state a goal    Therapy Time   Individual Concurrent Group Co-treatment   Time In 1504         Time Out 1545         Minutes [address] [facility] Eva Spears PT  Electronically signed by Drake Wells, PT [id number] on 1/21/2020 at 4:08 PM

## 2020-01-21 NOTE — PROGRESS NOTES
Hospital Medicine Progress Note     Date:  1/21/2020    PCP: Francisco Haynes (Tel: [phone])    Date of Admission: 1/16/2020    Subjective  Denies any new concerns this morning. Doing well and overall feels significantly improved. Objective  Physical exam:  Vitals: /72   Pulse 76   Temp 97.6 °F (36.4 °C) (Oral)   Resp 14   Ht 4' 10\" (1.473 m)   Wt 144 lb 10 oz (65.6 kg)   SpO2 95%   BMI 30.23 kg/m²   Gen: Not in distress. Alert. Head: Normocephalic. Atraumatic. Eyes: EOMI. Good acuity. ENT: Oral mucosa moist  Neck: No JVD. No obvious thyromegaly. CVS: Nml S1S2, no MRG, RRR  Pulmomary: Clear bilaterally. No crackles. No wheezes. Gastrointestinal: Soft, NT/ND. Positive bowel sounds. Musculoskeletal: No edema. Warm  Neuro: No focal deficit. Moves extremity spontaneously. Psychiatry: Appropriate affect. Not agitated. Skin: Warm, dry with normal turgor. No rash      24HR INTAKE/OUTPUT:      Intake/Output Summary (Last 24 hours) at 1/21/2020 1410  Last data filed at 1/21/2020 2493  Gross per 24 hour   Intake 1431.25 ml   Output --   Net 1431.25 ml     I/O last 3 completed shifts: In: 1431.3 [I.V.:1431.3]  Out: -   No intake/output data recorded.       Meds:    predniSONE  40 mg Oral Daily    balsalazide  2,250 mg Oral TID    [START ON 1/23/2020] ppd   Does not apply Once    levETIRAcetam  250 mg Oral BID    folic acid  4 mg Oral Daily    vitamin B-12  1,000 mcg Oral Daily    sodium chloride flush  10 mL Intravenous 2 times per day    iron sucrose  200 mg Intravenous Daily    pantoprazole  40 mg Intravenous BID    atorvastatin  40 mg Oral Nightly    lisinopril  20 mg Oral Daily    metoprolol succinate  25 mg Oral Daily       Infusions:    sodium chloride 75 mL/hr at 01/21/20 1124         PRN Meds: sodium chloride flush, haloperidol lactate, potassium chloride **OR** potassium alternative oral replacement **OR** potassium chloride, magnesium hydroxide, ondansetron    Labs/imaging:  CBC:   Recent Labs     01/19/20  0812 01/20/20  0739 01/21/20  0830   WBC 8.2 8.5 7.8   HGB 8.6* 8.5* 8.6*    400 475*         BMP:    Recent Labs     01/19/20  0812 01/20/20  0739 01/21/20  0830    143 141   K 3.9 3.6 4.6    109 109   CO2 21 22 20*   BUN 12 12 12   CREATININE 0.6 0.6 0.6   GLUCOSE 89 89 143*         Hepatic:   No results for input(s): AST, ALT, ALB, BILITOT, ALKPHOS in the last 72 hours. Troponin:   No results for input(s): TROPONINI in the last 72 hours. BNP: No results for input(s): BNP in the last 72 hours. INR: No results for input(s): INR in the last 72 hours. Reviewed imaging and reports noted      Assessment:  Active Problems:    CAD (coronary artery disease)    Pacemaker    HTN (hypertension)    Hypercholesteremia    UC (ulcerative colitis) ([facility] [address]    Change in mental status    Acute blood loss anemia  Resolved Problems:    * No resolved hospital problems.  *        Plan:  Syncope and collapse  -Clinically unable to determine  -Appreciate neurology recommendations  -CT head negative for any acute intracranial hemorrhage, did reveal focal area of encephalomalacia in the right parieto-occipital region consistent with remote infarct which is new from previous CT in 2018  -Cannot obtain MRI due to pacemaker  -Echo revealed EF of 55%, carotid ultrasound Dopplers did not reveal any significant stenosis  -Continue statin, hold aspirin and Plavix for now    Colitis suspected secondary to acute flare of ulcerative colitis  -Patient is status post EGD EGD and colonoscopy with biopsy, await results  -GI following, Solu-Medrol switched to prednisone 40 mg p.o. daily, also started on mesalamine  -Work-up underway for biological therapy including hep B labs, PPD test, QuantiFERON TB    Possible GI bleed secondary to flare of ulcerative colitis  -Appreciate GI recommendations  -Status post EGD and colonoscopy with biopsy on 1/20/2020

## 2020-01-21 NOTE — PROGRESS NOTES
INPATIENT CONSULTATION:    IDENTIFYING DATA/REASON FOR CONSULTATION   PATIENT:  Ely Mccall  MRN:  [id number]  ADMIT DATE: 1/16/2020  TIME OF EVALUATION: 1/21/2020 7:18 AM  HOSPITAL STAY:   LOS: 5 days   CONSULTING PHYSICIAN: Martell Pereyra MD   REASON FOR CONSULTATION: rectal bleeding    Subjective:    Patient denies abdominal pain. No diarrhea or rectal bleeding overnight    MEDICATIONS   SCHEDULED:  methylPREDNISolone, 20 mg, Q8H  levETIRAcetam, 605 mg, BID  folic acid, 4 mg, Daily  vitamin B-12, 1,000 mcg, Daily  sodium chloride flush, 10 mL, 2 times per day  iron sucrose, 200 mg, Daily  pantoprazole, 40 mg, BID  atorvastatin, 40 mg, Nightly  lisinopril, 20 mg, Daily  metoprolol succinate, 25 mg, Daily      FLUIDS/DRIPS:     sodium chloride 75 mL/hr at 01/20/20 2153     PRNs: sodium chloride flush, 10 mL, PRN  haloperidol lactate, 1 mg, Q4H PRN  potassium chloride, 40 mEq, PRN    Or  potassium alternative oral replacement, 40 mEq, PRN    Or  potassium chloride, 10 mEq, PRN  magnesium hydroxide, 30 mL, Daily PRN  ondansetron, 4 mg, Q6H PRN      ALLERGIES:    Allergies   Allergen Reactions    Cortisone Other (See Comments)     Injection site site sunk in    Percocet [Oxycodone-Acetaminophen] Nausea And Vomiting         PHYSICAL EXAM     Vitals:    01/20/20 1530 01/20/20 1956 01/21/20 0018 01/21/20 0434   BP: 128/82 124/80 133/88 (!) 145/79   Pulse: 84 79 79 77   Resp: 18 18 18 16   Temp: 97.9 °F (36.6 °C) 97.2 °F (36.2 °C) 98.3 °F (36.8 °C) 97.7 °F (36.5 °C)   TempSrc: Oral Oral Oral Oral   SpO2: 95% 94% 94% 94%   Weight:    144 lb 10 oz (65.6 kg)   Height:           I/O last 3 completed shifts:   In: 1431.3 [I.V.:1431.3]  Out: -     Physical Exam:  General appearance: alert, cooperative, no distress  Eyes: Anicteric  Head: Normocephalic, without obvious abnormality  Lungs: clear to auscultation bilaterally, Normal Effort  Heart: regular rate and rhythm, normal S1 and S2, no murmurs or rubs  Abdomen: performed. Colonoscopy 12/11/2019 with Dr. Stephon Uriarte  1. Ulcerative pan-colitis, mild in right colon and moderate/severe in the left colon  2. Random biopsies obtained  3. Normal terminal ileum  4. Mild sigmoid colon diverticulosis  5. Small internal hemorrhoids  A.  Right colon, biopsy:  - One colonic fragment with mild chronic active colitis. - Remaining colonic fragments with no significant abnormality.  - No dysplasia identified. B.  Left colon, biopsy:  - Moderate to severe chronic active colitis. - No dysplasia identified. ASSESSMENT AND RECOMMENDATIONS   Lashay Costa is a 69 y.o. female with PMH of UC (initially dx 2003 with colonoscopy but has not been on long term medications), CAD on plavix, pacemaker, HTN, HLD who presented 1/16/20 with syncope, weakness. Found to be anemic. Recently admitted to [facility] with rectal bleeding and anemia, colonoscopy 12/11 showed ulcerative pan-colitis with bx showing moderate to severe chronic active colitis. Also found with E Coli +stool studies. She was treated with antibiotics for suspected actue infectious colitis vs UC. This admission repeat colonoscopy as well as EGD was completed 1/20/20 showing moderated colitis from rectum to hepatic flexure. Biopsies pending. Started on solumedrol for suspected UC     1. Colitis:  Suspect 2/2 acute on chronic ulcerative colitis. Biopsies pending    2. Anemia 2/2 #1. stable    RECOMMENDATIONS:    Pt with no diarrhea, bleeding or abd pain this morning. Will switch IV solumedrol to prednisone 40 mg daily. Will also start mesalamine   Will order Hep B labs, PPD test, quantiferon TB as part of work up for biologic therapy  IV venofer while here. Convert to oral iron supplements at discharge  Diet as tolerated    If you have any questions or need any further information, please feel free to contact us [phone]. Thank you for allowing us to participate in the care of Lashay Costa.     Oxana SCOTT    Attending

## 2020-01-21 NOTE — PROGRESS NOTES
Occupational Therapy  Facility/Department: [facility] MED SURG  Daily Treatment Note  NAME: Ora Kimble  : 1945  MRN: [id number]    Date of Service: 2020    Discharge Recommendations:  Patient would benefit from continued therapy after discharge, 24 hour supervision or assist, Home with Home health OT     Ora Kimble scored a 19/24 on the AM-PAC ADL Inpatient form. Current research shows that an AM-PAC score of 18 or greater is typically associated with a discharge to the patient's home setting. Based on the patients AM-PAC score and their current ADL deficits, it is recommended that the patient have 2-3 sessions per week of Occupational Therapy at d/c to increase the patients independence. Assessment   Performance deficits / Impairments: Decreased functional mobility ; Decreased cognition;Decreased ADL status; Decreased safe awareness;Decreased balance;Decreased high-level IADLs  Assessment: Pt tolerated treatment well, making progress towards goals. Pt cognition much improved as compared with initial eval, A&O x4 and appropriate in conversation, able to consistently follow commands. Pt did require verbal cues for safety. Pt completed dressing and grooming with SBA, fxl transfers/mobility in room and hallway with no AD and SBA/CGA, RW and SBA. Will continue to see on acute to address the above limitations and maximize pt's safety and independence. Anticipate pt would be able to return home with 24 hour assist and home OT (level 3 home care) to ensure safe transition home. Prognosis: Good  OT Education: OT Role;Plan of Care;Transfer Training;ADL Adaptive Strategies  Barriers to Learning: hearing  REQUIRES OT FOLLOW UP: Yes  Activity Tolerance  Activity Tolerance: Patient Tolerated treatment well  Safety Devices  Safety Devices in place: Yes  Type of devices: Call light within reach; Chair alarm in place; Left in chair;Gait belt         Patient Diagnosis(es): The primary encounter diagnosis was

## 2020-01-22 LAB
ANION GAP SERPL CALCULATED.3IONS-SCNC: 12 MMOL/L (ref 3–16)
BASOPHILS ABSOLUTE: 0.1 K/UL (ref 0–0.2)
BASOPHILS RELATIVE PERCENT: 0.5 %
BLOOD BANK REF CASE: NORMAL
BUN BLDV-MCNC: 16 MG/DL (ref 7–20)
CALCIUM SERPL-MCNC: 8.5 MG/DL (ref 8.3–10.6)
CHLORIDE BLD-SCNC: 109 MMOL/L (ref 99–110)
CO2: 20 MMOL/L (ref 21–32)
CREAT SERPL-MCNC: 0.5 MG/DL (ref 0.6–1.2)
EOSINOPHILS ABSOLUTE: 0 K/UL (ref 0–0.6)
EOSINOPHILS RELATIVE PERCENT: 0 %
GFR AFRICAN AMERICAN: >60
GFR NON-AFRICAN AMERICAN: >60
GLUCOSE BLD-MCNC: 122 MG/DL (ref 70–99)
HCT VFR BLD CALC: 25.3 % (ref 36–48)
HEMATOLOGY PATH CONSULT: NO
HEMOGLOBIN: 7.5 G/DL (ref 12–16)
LYMPHOCYTES ABSOLUTE: 2.3 K/UL (ref 1–5.1)
LYMPHOCYTES RELATIVE PERCENT: 14.3 %
MCH RBC QN AUTO: 20.8 PG (ref 26–34)
MCHC RBC AUTO-ENTMCNC: 29.9 G/DL (ref 31–36)
MCV RBC AUTO: 69.7 FL (ref 80–100)
MONOCYTES ABSOLUTE: 0.9 K/UL (ref 0–1.3)
MONOCYTES RELATIVE PERCENT: 5.8 %
NEUTROPHILS ABSOLUTE: 12.8 K/UL (ref 1.7–7.7)
NEUTROPHILS RELATIVE PERCENT: 79.4 %
PDW BLD-RTO: 24.6 % (ref 12.4–15.4)
PLATELET # BLD: 408 K/UL (ref 135–450)
PMV BLD AUTO: 7.3 FL (ref 5–10.5)
POTASSIUM SERPL-SCNC: 3.6 MMOL/L (ref 3.5–5.1)
RBC # BLD: 3.63 M/UL (ref 4–5.2)
SODIUM BLD-SCNC: 141 MMOL/L (ref 136–145)
WBC # BLD: 16.1 K/UL (ref 4–11)

## 2020-01-22 PROCEDURE — 2580000003 HC RX 258: Performed by: INTERNAL MEDICINE

## 2020-01-22 PROCEDURE — 6370000000 HC RX 637 (ALT 250 FOR IP): Performed by: PHYSICIAN ASSISTANT

## 2020-01-22 PROCEDURE — 6360000002 HC RX W HCPCS: Performed by: INTERNAL MEDICINE

## 2020-01-22 PROCEDURE — 97110 THERAPEUTIC EXERCISES: CPT

## 2020-01-22 PROCEDURE — 85025 COMPLETE CBC W/AUTO DIFF WBC: CPT

## 2020-01-22 PROCEDURE — 6370000000 HC RX 637 (ALT 250 FOR IP): Performed by: INTERNAL MEDICINE

## 2020-01-22 PROCEDURE — 80048 BASIC METABOLIC PNL TOTAL CA: CPT

## 2020-01-22 PROCEDURE — 97530 THERAPEUTIC ACTIVITIES: CPT

## 2020-01-22 PROCEDURE — 94760 N-INVAS EAR/PLS OXIMETRY 1: CPT

## 2020-01-22 PROCEDURE — 1200000000 HC SEMI PRIVATE

## 2020-01-22 PROCEDURE — 36415 COLL VENOUS BLD VENIPUNCTURE: CPT

## 2020-01-22 PROCEDURE — C9113 INJ PANTOPRAZOLE SODIUM, VIA: HCPCS | Performed by: INTERNAL MEDICINE

## 2020-01-22 RX ADMIN — BALSALAZIDE DISODIUM 2250 MG: 750 CAPSULE ORAL at 08:49

## 2020-01-22 RX ADMIN — PANTOPRAZOLE SODIUM 40 MG: 40 INJECTION, POWDER, FOR SOLUTION INTRAVENOUS at 08:50

## 2020-01-22 RX ADMIN — LISINOPRIL 20 MG: 20 TABLET ORAL at 08:49

## 2020-01-22 RX ADMIN — PREDNISONE 40 MG: 20 TABLET ORAL at 08:49

## 2020-01-22 RX ADMIN — SODIUM CHLORIDE: 9 INJECTION, SOLUTION INTRAVENOUS at 09:19

## 2020-01-22 RX ADMIN — PANTOPRAZOLE SODIUM 40 MG: 40 INJECTION, POWDER, FOR SOLUTION INTRAVENOUS at 21:41

## 2020-01-22 RX ADMIN — SODIUM CHLORIDE, PRESERVATIVE FREE 10 ML: 5 INJECTION INTRAVENOUS at 08:50

## 2020-01-22 RX ADMIN — IRON SUCROSE 200 MG: 20 INJECTION, SOLUTION INTRAVENOUS at 08:50

## 2020-01-22 RX ADMIN — SODIUM CHLORIDE, PRESERVATIVE FREE 10 ML: 5 INJECTION INTRAVENOUS at 21:41

## 2020-01-22 RX ADMIN — LEVETIRACETAM 250 MG: 250 TABLET ORAL at 08:49

## 2020-01-22 RX ADMIN — BALSALAZIDE DISODIUM 2250 MG: 750 CAPSULE ORAL at 21:41

## 2020-01-22 RX ADMIN — CYANOCOBALAMIN TAB 1000 MCG 1000 MCG: 1000 TAB at 08:49

## 2020-01-22 RX ADMIN — BALSALAZIDE DISODIUM 2250 MG: 750 CAPSULE ORAL at 14:58

## 2020-01-22 RX ADMIN — ATORVASTATIN CALCIUM 40 MG: 40 TABLET, FILM COATED ORAL at 21:41

## 2020-01-22 RX ADMIN — LEVETIRACETAM 250 MG: 250 TABLET ORAL at 21:41

## 2020-01-22 RX ADMIN — FOLIC ACID 4 MG: 1 TABLET ORAL at 08:49

## 2020-01-22 RX ADMIN — METOPROLOL SUCCINATE 25 MG: 25 TABLET, EXTENDED RELEASE ORAL at 08:49

## 2020-01-22 ASSESSMENT — PAIN SCALES - GENERAL
PAINLEVEL_OUTOF10: 0
PAINLEVEL_OUTOF10: 0

## 2020-01-22 NOTE — PROGRESS NOTES
Occupational Therapy  Facility/Department: [facility] MED SURG  Daily Treatment Note  NAME: Dutch Hernandez  : 1945  MRN: [id number]    Date of Service: 2020    Discharge Recommendations:  Patient would benefit from continued therapy after discharge, 24 hour supervision or assist, Home with Home health OT      Dutch Hernandez scored a 19/24 on the AM-PAC ADL Inpatient form. Current research shows that an AM-PAC score of 18 or greater is typically associated with a discharge to the patient's home setting. Based on the patients AM-PAC score and their current ADL deficits, it is recommended that the patient have 2-3 sessions per week of Occupational Therapy at d/c to increase the patients independence. Assessment   Performance deficits / Impairments: Decreased functional mobility ; Decreased cognition;Decreased ADL status; Decreased safe awareness;Decreased balance;Decreased high-level IADLs  Assessment: Pt making good progress towards goals, but continues to function below baseline level of independent d/t the above deficits. This date, pt SBA/CGA fxl transfers, SBA fxl mobility with walker in room and hallway, declined to complete ADLs but anticipate pt would require overall SBA/CGA for ADLs. Pt required verbal cues for safety with fxl transfers/mobility. Pt with improved balance using walker,  however, reports she erin not use at home despite education. Pt participated in therex to improve strength/endurance for ADLs, tolerated well. Cont per OT POC. Anticiapte pt would be able to return home with 24 hour assist and home OT (level 3 home care) to ensure safe transition home. Prognosis: Good  OT Education: OT Role;Plan of Care;Transfer Training  Patient Education: UE therex  Barriers to Learning: hearing  REQUIRES OT FOLLOW UP: Yes  Activity Tolerance  Activity Tolerance: Patient Tolerated treatment well  Safety Devices  Safety Devices in place: Yes  Type of devices: Call light within reach; Chair alarm in place; Left in chair;Gait belt(RN Letty notified)         Patient Diagnosis(es): The primary encounter diagnosis was Fall in home, initial encounter. Diagnoses of Blood in stool and History of ulcerative colitis were also pertinent to this visit. has a past medical history of Acute MI ([address] Eczema, [place]ie (hard of hearing), Hypercholesteremia, Hypertension, Pacemaker, Ulcerative colitis, and Wears hearing aid. has a past surgical history that includes Hysterectomy (1996); Pacemaker insertion; Coronary angioplasty with stent; Ankle fracture surgery (7/26/11); rhinoplasty; Upper gastrointestinal endoscopy (N/A, 1/20/2020); and Colonoscopy (N/A, 1/20/2020). Restrictions  Restrictions/Precautions  Restrictions/Precautions: Fall Risk  Position Activity Restriction  Other position/activity restrictions: [place]  Subjective   General  Chart Reviewed: Yes  Patient assessed for rehabilitation services?: Yes  Additional Pertinent Hx: \"70 y. o.female With medical history significant for coronary artery disease, ulcerative colitis, anemia and blood transfusions. Patient presented to the emergency room when she was found down by her son in the bathroom floor. Patient could not recall the circumstances around her fall. Patient does report that she has been experiencing Episodes of dizziness. \" Per Dr. Guerrero Mercado 1/16/2020. EEG shows Mild back ground slowing and disorganization in right temporal/occipital region. No definitive epileptiform activity     Family / Caregiver Present: No  Referring Practitioner: Dr. Bladimir Mak  Diagnosis: encephalopathy R/O seizures  Subjective  Subjective: Pt b/s for OT tx. Pt seated in recliner on arrival, agreeable to participate in therapy with min verbal encouragement. Pt did not report or indicate any pain.          Orientation  Orientation  Overall Orientation Status: Within Functional Limits  Orientation Level: Oriented to place;Oriented to time;Oriented to situation;Oriented to person Objective    ADL  LE Dressing: Stand by assistance(to don socks and don sandals)  Additional Comments: Pt reports already completed all grooming and declined to complete shower despite encouragement. Balance  Sitting Balance: Independent  Standing Balance: Stand by assistance  Functional Mobility  Functional - Mobility Device: Rolling Walker  Assist Level: Stand by assistance  Functional Mobility Comments: Pt completed fxl mobility in room and household distance in hallway with RW and SBA. Discussed using walker at home. Pt reports son has a walker, but reports she would not use despite education on improved balance and safety with walker. Bed mobility  Comment: pt OOB to recliner at start/end of session     Transfers  Sit to stand: Stand by assistance  Stand to sit: Contact guard assistance(pt with posterior LOB into chair when backing up requiring CGA to correct, verbal cues for hand placement and safe technique with walker)     Cognition  Overall Cognitive Status: Exceptions  Following Commands: Follows one step commands consistently; Follows one step commands with increased time(d/t [place])  Safety Judgement: Decreased awareness of need for safety;Decreased awareness of need for assistance  Problem Solving: Decreased awareness of errors;Assistance required to identify errors made  Insights: Decreased awareness of deficits       Type of ROM/Therapeutic Exercise  Type of ROM/Therapeutic Exercise: AROM  Comment: Pt completed B UE therex to improve strength/endurance for ADLs and fxl transfers. Pt required verbal/visual cues for correct technique with therex.    Exercises  Shoulder Flexion: x10  Shoulder ABduction: x10  Horizontal ABduction: x10  Horizontal ADduction: x10  Other: arm circles fowards/backwards x10 reps each direction, air armbike x1 minute, modified sit-up in recliner x10, sit><stand from recliner x10                    Plan   Plan  Times per week: 3-5  Times per day: Daily  Current Treatment

## 2020-01-22 NOTE — PROGRESS NOTES
[facility] Medicine Progress Note     Date:  1/22/2020    PCP: Stalin Reyes (Tel: [phone])    Date of Admission: 1/16/2020    Subjective  Denies any new concerns this morning. Patient mentions she is doing well and overall feels significantly improved. Objective  Physical exam:  Vitals: BP (!) 174/77   Pulse 74   Temp 97.5 °F (36.4 °C) (Oral)   Resp 16   Ht 4' 10\" (1.473 m)   Wt 155 lb 6.8 oz (70.5 kg)   SpO2 95%   BMI 32.48 kg/m²   Gen: Not in distress. Alert. Head: Normocephalic. Atraumatic. Eyes: EOMI. Good acuity. ENT: Oral mucosa moist  Neck: No JVD. No obvious thyromegaly. CVS: Nml S1S2, no MRG, RRR  Pulmomary: Clear bilaterally. No crackles. No wheezes. Gastrointestinal: Soft, NT/ND. Positive bowel sounds. Musculoskeletal: No edema. Warm  Neuro: No focal deficit. Moves extremity spontaneously. Psychiatry: Appropriate affect. Not agitated. Skin: Warm, dry with normal turgor. No rash      24HR INTAKE/OUTPUT:      Intake/Output Summary (Last 24 hours) at 1/22/2020 1818  Last data filed at 1/22/2020 1504  Gross per 24 hour   Intake 2997.5 ml   Output --   Net 2997.5 ml     I/O last 3 completed shifts: In: 2047.5 [P.O.:840; I.V.:1207.5]  Out: -   I/O this shift:   In: 950 [I.V.:950]  Out: -       Meds:    predniSONE  40 mg Oral Daily    balsalazide  2,250 mg Oral TID    [START ON 1/23/2020] ppd   Does not apply Once    levETIRAcetam  250 mg Oral BID    folic acid  4 mg Oral Daily    vitamin B-12  1,000 mcg Oral Daily    sodium chloride flush  10 mL Intravenous 2 times per day    iron sucrose  200 mg Intravenous Daily    pantoprazole  40 mg Intravenous BID    atorvastatin  40 mg Oral Nightly    lisinopril  20 mg Oral Daily    metoprolol succinate  25 mg Oral Daily       Infusions:    sodium chloride 75 mL/hr at 01/22/20 0919         PRN Meds: sodium chloride flush, haloperidol lactate, potassium chloride **OR** potassium alternative oral replacement **OR** potassium chloride, magnesium hydroxide, ondansetron    Labs/imaging:  CBC:   Recent Labs     01/20/20  0739 01/21/20  0830 01/22/20  0547   WBC 8.5 7.8 16.1*   HGB 8.5* 8.6* 7.5*    475* 408         BMP:    Recent Labs     01/20/20  0739 01/21/20  0830 01/22/20  0547    141 141   K 3.6 4.6 3.6    109 109   CO2 22 20* 20*   BUN 12 12 16   CREATININE 0.6 0.6 0.5*   GLUCOSE 89 143* 122*         Hepatic:   No results for input(s): AST, ALT, ALB, BILITOT, ALKPHOS in the last 72 hours. Troponin:   No results for input(s): TROPONINI in the last 72 hours. BNP: No results for input(s): BNP in the last 72 hours. INR: No results for input(s): INR in the last 72 hours. Reviewed imaging and reports noted      Assessment:  Active Problems:    CAD (coronary artery disease)    Pacemaker    HTN (hypertension)    Hypercholesteremia    UC (ulcerative colitis) ([facility] [address]    Change in mental status    Acute blood loss anemia  Resolved Problems:    * No resolved hospital problems.  *        Plan:  Syncope and collapse  -Appreciate neurology recommendations   -CT head negative for any acute intracranial hemorrhage, did reveal focal area of encephalomalacia in the right parieto-occipital region consistent with remote infarct which is new from previous CT in 2018  -Cannot obtain MRI due to pacemaker  -Echo revealed EF of 55%, carotid ultrasound Dopplers did not reveal any significant stenosis  -Continue statin, hold aspirin and Plavix for now      Acute on chronic ulcerative colitis   -Patient is status post EGD EGD and colonoscopy with biopsy, await results  -GI following, Solu-Medrol switched to prednisone 40 mg p.o. daily, also started on mesalamine  -Work-up underway for biological therapy including hep B labs, PPD test, QuantiFERON TB    Possible GI bleed secondary to flare of ulcerative colitis  -Appreciate GI recommendations  -Status post EGD and colonoscopy with biopsy on 1/20/2020 revealing erosive gastritis of the antrum, moderate colitis with erythema, edema and ulcerations from the rectum to the hepatic flexure  -Continue prednisone,, Protonix, IV iron, balsalazide for now per GI  -GI ordered PPD, pending result  - aspirin and Plavix on hold-resume when okay with GI   transfuse hemoglobin less than 7      Acute blood loss anemia  -Secondary to above, continue to monitor    CAD  -Hold aspirin and Plavix, continue statin, beta-blocker    Hypokalemia  -Replace, continue to monitor    Essential hypertension  -BP stable, continue present management, continue to monitor      Diet: DIET LOW FIBER; Activity: Up with assist  Prophylaxis: SCD    Code status: Full Code     Disposition.   Pending placement- aware, PPD result, discharge to SNF likely 1/23  ----------        Kosta Graf MD  -------------------------------  Lor hospitalist

## 2020-01-22 NOTE — PLAN OF CARE
Problem: Pain:  Goal: Pain level will decrease  Description  Pain level will decrease  Outcome: Ongoing     Problem: Falls - Risk of:  Goal: Will remain free from falls  Description  Will remain free from falls  Outcome: Ongoing  Wheels of bed locked x 4. Room free from clutter. Non-skid footwear intact. Call light in reach of patient at all times.    Problem: Discharge Planning:  Goal: Discharged to appropriate level of care  Description  Discharged to appropriate level of care  Outcome: Ongoing

## 2020-01-22 NOTE — PROGRESS NOTES
INPATIENT CONSULTATION:    IDENTIFYING DATA/REASON FOR CONSULTATION   PATIENT:  Jaron Smith  MRN:  [id number]  ADMIT DATE: 1/16/2020  TIME OF EVALUATION: 1/22/2020 6:29 AM  HOSPITAL STAY:   LOS: 6 days   CONSULTING PHYSICIAN:  REASON FOR CONSULTATION:    Subjective:    -Patient denies abdominal pain. No rectal bleeding. She reports some diarrhea yesterday. 1 reported stool in Epic. MEDICATIONS   SCHEDULED:  predniSONE, 40 mg, Daily  balsalazide, 2,250 mg, TID  [START ON 1/23/2020] ppd, , Once  levETIRAcetam, 562 mg, BID  folic acid, 4 mg, Daily  vitamin B-12, 1,000 mcg, Daily  sodium chloride flush, 10 mL, 2 times per day  iron sucrose, 200 mg, Daily  pantoprazole, 40 mg, BID  atorvastatin, 40 mg, Nightly  lisinopril, 20 mg, Daily  metoprolol succinate, 25 mg, Daily      FLUIDS/DRIPS:     sodium chloride 75 mL/hr at 01/21/20 2246     PRNs: sodium chloride flush, 10 mL, PRN  haloperidol lactate, 1 mg, Q4H PRN  potassium chloride, 40 mEq, PRN    Or  potassium alternative oral replacement, 40 mEq, PRN    Or  potassium chloride, 10 mEq, PRN  magnesium hydroxide, 30 mL, Daily PRN  ondansetron, 4 mg, Q6H PRN      ALLERGIES:  She [unfilled]      PHYSICAL EXAM   [unfilled]   I/O last 3 completed shifts:   In: 2303.8 [P.O.:440; I.V.:1863.8]  Out: -   Oxygen Therapy:  @[id number])@  @[id number])@  @[id number])@    Physical Exam:  Gen: Resting in bed, NAD   CV: RRR no MRG   Pul: CTAB   Abd: Good bowel sounds throughout, no scars, soft, NT/ND, no masses, no HSM   Ext: No edema   Neuro: No asterixis   Skin: No jaundice, spider angiomas, lemos erythema     LABS AND IMAGING     Recent Results (from the past 24 hour(s))   CBC Auto Differential    Collection Time: 01/21/20  8:30 AM   Result Value Ref Range    WBC 7.8 4.0 - 11.0 K/uL    RBC 4.03 4.00 - 5.20 M/uL    Hemoglobin 8.6 (L) 12.0 - 16.0 g/dL    Hematocrit 27.6 (L) 36.0 - 48.0 %    MCV 68.6 (L) 80.0 - 100.0 fL    MCH 21.3 (L) 26.0 - 34.0 pg    MCHC 31.1 31.0 - 36.0 g/dL    RDW 24.5 (H) 12.4 - 15.4 %    Platelets 083 (H) 801 - 450 K/uL    MPV 6.7 5.0 - 10.5 fL    Path Consult No     Neutrophils % 85.7 %    Lymphocytes % 12.7 %    Monocytes % 1.4 %    Eosinophils % 0.1 %    Basophils % 0.1 %    Neutrophils Absolute 6.7 1.7 - 7.7 K/uL    Lymphocytes Absolute 1.0 1.0 - 5.1 K/uL    Monocytes Absolute 0.1 0.0 - 1.3 K/uL    Eosinophils Absolute 0.0 0.0 - 0.6 K/uL    Basophils Absolute 0.0 0.0 - 0.2 K/uL   Basic Metabolic Panel    Collection Time: 01/21/20  8:30 AM   Result Value Ref Range    Sodium 141 136 - 145 mmol/L    Potassium 4.6 3.5 - 5.1 mmol/L    Chloride 109 99 - 110 mmol/L    CO2 20 (L) 21 - 32 mmol/L    Anion Gap 12 3 - 16    Glucose 143 (H) 70 - 99 mg/dL    BUN 12 7 - 20 mg/dL    CREATININE 0.6 0.6 - 1.2 mg/dL    GFR Non-African American >60 >60    GFR African American >60 >60    Calcium 8.9 8.3 - 10.6 mg/dL   Hepatitis B Surface Antibody    Collection Time: 01/21/20 10:30 AM   Result Value Ref Range    Hep B S Ab <3.50 mIU/mL   Hepatitis B Core Antibody, IgM    Collection Time: 01/21/20 10:30 AM   Result Value Ref Range    Hep B Core Ab, IgM Non-reactive Non-reactive     Other Labs    Imaging    ASSESSMENT AND RECOMMENDATIONS   Paulette Macario is a 69 y.o. female with PMH of UC (initially dx 2003 with colonoscopy but has not been on long term medications), CAD on plavix, pacemaker, HTN, HLD who presented 1/16/20 with syncope, weakness. Found to be anemic. Recently admitted to [facility] with rectal bleeding and anemia, colonoscopy 12/11 showed ulcerative pan-colitis with bx showing moderate to severe chronic active colitis. Also found with E Coli +stool studies. She was treated with antibiotics for suspected actue infectious colitis vs UC. This admission repeat colonoscopy as well as EGD was completed 1/20/20 showing moderated colitis from rectum to hepatic flexure. Biopsies pending. Impression:  1.  Colitis:  Suspect 2/2 acute on chronic ulcerative colitis. Biopsies pending    2. Anemia 2/2 #1. stable     RECOMMENDATIONS:    -Recommend pre-biologic work-up. PPD placed. -Recommend Prednisone 40 mg daily/Balsalazide for now. -Recommend IV Iron load. -Outpatient follow-up at [facility]. If you have any questions or need any further information, please feel free to contact anyone on our consult team.  Thank you for allowing us to participate in the care of Shanthi Valente.     Melissa Galeas MD  [address]

## 2020-01-23 PROBLEM — R55 ATYPICAL SYNCOPE: Status: ACTIVE | Noted: 2020-01-23

## 2020-01-23 PROBLEM — R41.82 CHANGE IN MENTAL STATUS: Status: RESOLVED | Noted: 2020-01-16 | Resolved: 2020-01-23

## 2020-01-23 LAB
ANION GAP SERPL CALCULATED.3IONS-SCNC: 12 MMOL/L (ref 3–16)
BASOPHILS ABSOLUTE: 0.1 K/UL (ref 0–0.2)
BASOPHILS RELATIVE PERCENT: 0.7 %
BUN BLDV-MCNC: 11 MG/DL (ref 7–20)
CALCIUM SERPL-MCNC: 8.4 MG/DL (ref 8.3–10.6)
CHLORIDE BLD-SCNC: 112 MMOL/L (ref 99–110)
CO2: 20 MMOL/L (ref 21–32)
CREAT SERPL-MCNC: 0.5 MG/DL (ref 0.6–1.2)
EOSINOPHILS ABSOLUTE: 0.1 K/UL (ref 0–0.6)
EOSINOPHILS RELATIVE PERCENT: 0.7 %
GFR AFRICAN AMERICAN: >60
GFR NON-AFRICAN AMERICAN: >60
GLUCOSE BLD-MCNC: 78 MG/DL (ref 70–99)
HCT VFR BLD CALC: 26.6 % (ref 36–48)
HEMOGLOBIN: 8 G/DL (ref 12–16)
HEPATITIS B CORE TOTAL ANTIBODY: NEGATIVE
LYMPHOCYTES ABSOLUTE: 3.4 K/UL (ref 1–5.1)
LYMPHOCYTES RELATIVE PERCENT: 25.7 %
MCH RBC QN AUTO: 21.1 PG (ref 26–34)
MCHC RBC AUTO-ENTMCNC: 30.1 G/DL (ref 31–36)
MCV RBC AUTO: 70 FL (ref 80–100)
MONOCYTES ABSOLUTE: 1.3 K/UL (ref 0–1.3)
MONOCYTES RELATIVE PERCENT: 10.1 %
NEUTROPHILS ABSOLUTE: 8.3 K/UL (ref 1.7–7.7)
NEUTROPHILS RELATIVE PERCENT: 62.8 %
PDW BLD-RTO: 24.7 % (ref 12.4–15.4)
PLATELET # BLD: 398 K/UL (ref 135–450)
PMV BLD AUTO: 6.4 FL (ref 5–10.5)
POTASSIUM SERPL-SCNC: 3 MMOL/L (ref 3.5–5.1)
RBC # BLD: 3.8 M/UL (ref 4–5.2)
SODIUM BLD-SCNC: 144 MMOL/L (ref 136–145)
WBC # BLD: 13.2 K/UL (ref 4–11)

## 2020-01-23 PROCEDURE — 6370000000 HC RX 637 (ALT 250 FOR IP): Performed by: PHYSICIAN ASSISTANT

## 2020-01-23 PROCEDURE — C9113 INJ PANTOPRAZOLE SODIUM, VIA: HCPCS | Performed by: INTERNAL MEDICINE

## 2020-01-23 PROCEDURE — 36415 COLL VENOUS BLD VENIPUNCTURE: CPT

## 2020-01-23 PROCEDURE — 2580000003 HC RX 258: Performed by: INTERNAL MEDICINE

## 2020-01-23 PROCEDURE — 1200000000 HC SEMI PRIVATE

## 2020-01-23 PROCEDURE — 97535 SELF CARE MNGMENT TRAINING: CPT

## 2020-01-23 PROCEDURE — 6370000000 HC RX 637 (ALT 250 FOR IP): Performed by: INTERNAL MEDICINE

## 2020-01-23 PROCEDURE — 80048 BASIC METABOLIC PNL TOTAL CA: CPT

## 2020-01-23 PROCEDURE — 85025 COMPLETE CBC W/AUTO DIFF WBC: CPT

## 2020-01-23 PROCEDURE — 6360000002 HC RX W HCPCS: Performed by: INTERNAL MEDICINE

## 2020-01-23 PROCEDURE — 97130 THER IVNTJ EA ADDL 15 MIN: CPT

## 2020-01-23 PROCEDURE — 97530 THERAPEUTIC ACTIVITIES: CPT

## 2020-01-23 PROCEDURE — 97129 THER IVNTJ 1ST 15 MIN: CPT

## 2020-01-23 RX ADMIN — PANTOPRAZOLE SODIUM 40 MG: 40 INJECTION, POWDER, FOR SOLUTION INTRAVENOUS at 09:39

## 2020-01-23 RX ADMIN — BALSALAZIDE DISODIUM 2250 MG: 750 CAPSULE ORAL at 16:33

## 2020-01-23 RX ADMIN — IRON SUCROSE 200 MG: 20 INJECTION, SOLUTION INTRAVENOUS at 09:39

## 2020-01-23 RX ADMIN — SODIUM CHLORIDE: 9 INJECTION, SOLUTION INTRAVENOUS at 09:51

## 2020-01-23 RX ADMIN — LISINOPRIL 20 MG: 20 TABLET ORAL at 09:40

## 2020-01-23 RX ADMIN — BALSALAZIDE DISODIUM 2250 MG: 750 CAPSULE ORAL at 09:39

## 2020-01-23 RX ADMIN — LEVETIRACETAM 250 MG: 250 TABLET ORAL at 09:40

## 2020-01-23 RX ADMIN — LEVETIRACETAM 250 MG: 250 TABLET ORAL at 23:40

## 2020-01-23 RX ADMIN — FOLIC ACID 4 MG: 1 TABLET ORAL at 09:39

## 2020-01-23 RX ADMIN — POTASSIUM CHLORIDE 10 MEQ: 7.46 INJECTION, SOLUTION INTRAVENOUS at 09:47

## 2020-01-23 RX ADMIN — ATORVASTATIN CALCIUM 40 MG: 40 TABLET, FILM COATED ORAL at 23:40

## 2020-01-23 RX ADMIN — SODIUM CHLORIDE, PRESERVATIVE FREE 10 ML: 5 INJECTION INTRAVENOUS at 23:41

## 2020-01-23 RX ADMIN — METOPROLOL SUCCINATE 25 MG: 25 TABLET, EXTENDED RELEASE ORAL at 10:07

## 2020-01-23 RX ADMIN — PANTOPRAZOLE SODIUM 40 MG: 40 INJECTION, POWDER, FOR SOLUTION INTRAVENOUS at 23:40

## 2020-01-23 RX ADMIN — PREDNISONE 40 MG: 20 TABLET ORAL at 09:40

## 2020-01-23 RX ADMIN — CYANOCOBALAMIN TAB 1000 MCG 1000 MCG: 1000 TAB at 09:40

## 2020-01-23 RX ADMIN — SODIUM CHLORIDE: 9 INJECTION, SOLUTION INTRAVENOUS at 00:03

## 2020-01-23 RX ADMIN — SODIUM CHLORIDE: 9 INJECTION, SOLUTION INTRAVENOUS at 16:34

## 2020-01-23 RX ADMIN — SODIUM CHLORIDE, PRESERVATIVE FREE 10 ML: 5 INJECTION INTRAVENOUS at 10:03

## 2020-01-23 RX ADMIN — BALSALAZIDE DISODIUM 2250 MG: 750 CAPSULE ORAL at 23:40

## 2020-01-23 ASSESSMENT — PAIN SCALES - GENERAL
PAINLEVEL_OUTOF10: 0

## 2020-01-23 ASSESSMENT — ENCOUNTER SYMPTOMS
VOMITING: 0
DIARRHEA: 0
SHORTNESS OF BREATH: 0
ABDOMINAL DISTENTION: 0
COUGH: 0
CONSTIPATION: 0
ANAL BLEEDING: 0

## 2020-01-23 NOTE — PROGRESS NOTES
Speech Language Pathology  Facility/Department: [facility]  Speech/Language/Cognitive Daily Treatment Note    NAME: Claudia Richardson  : [birth year]   MRN: [id number]  ADMISSION DATE: 2020  ADMITTING DIAGNOSIS: The primary encounter diagnosis was Fall in home, initial encounter. Neuro Work up in progress. Diagnoses of Blood in stool and History of ulcerative colitis were also pertinent to this visit. · has Ankle fracture; CAD (coronary artery disease); Pacemaker; HTN (hypertension); Hypercholesteremia; UC (ulcerative colitis) ([address] Intractable headache; Change in mental status; and Acute blood loss anemia on their problem list.  DATE ONSET: 2020      Chart Review:  · MD History and Physical Documentation revealed: s/p Fall ; Neuro Work up  The primary encounter diagnosis was Fall in home, initial encounter. Neuro Work up in progress. Diagnoses of Blood in stool and History of ulcerative colitis were also pertinent to this visit  · RECENT RESULTS  CT OF HEAD/MRI:  2020:     1. No acute intracranial hemorrhage   2. Focal area of encephalomalacia has developed involving the right   parietal-occipital region consistent with remote infarct.  This is new from   prior CT of 2018. 3. Stable chronic ischemic changes involving the periventricular white matter   and the right basal ganglia.       ·  has a past medical history of Acute MI ([facility] [address] (), Eczema, [place] (hard of hearing), Hypercholesteremia, Hypertension, Pacemaker, Ulcerative colitis (), and Wears hearing aid. · Lives with spouse. Pt self report is that she participates in home management and shares responsibilities with son. · 2020 Colonoscopy and EGD  · 2020: SNF versus home with [place] therapies being considered    Subjective:  Pt reports doing better but still has IVs but not sure why. Pt reports \"I have been here too long\"         Objective Treatment Impressions  Assessment:  1.  Pt with known hearing deficits which do impact conversational exchange. Pt self reports hearing aides are not working. · Pt most optimal when communication partner is facing pt. Pt is most optimal with short/concrete statements/commands. Pt is also able to read printed information which may also compensate for hearing/working memory deficits. 2. Pt with cognitive-linguistic deficits in domains of math language and numeric reasoning; working memory and STM; insight and VPS. Pt at times was a conflicting historian. Hearing is a factor; but also concern for cognitive -linguistic deficits. Pt was able to participate in many cognitive-linguistic tasks but required mild assist.  ·  If pt is discharged home with family would suggest family assist with adv DL activities (med org, etc). HH SLP may also be beneficial  · If pt is discharged to SNF; SLP f/u recommended  · Back to pt/family regarding pt's hearing aides (pt with conflicting reports of problem with her current hearing aides)    Recommendations:  Continue therapy while on acute medical floor for cognitive -linguistic remediation and ongoing assessment    Plan:   Goals:  Goal 1: Pt will participate in the assessment of writing and higher level PS. ongoing  Goal 2: Pt will be O x 4 with occasional cues. Goal met with written cues PRN  Goal 3: Pt will recall strategies learned from PT and OT re: ambulation and ADL's with 80% accuracy and min cues. Ongoing; pt with conflicting information this date/time  Goal 4: Pt will follow multi-unit directions with 80% accuracy and occasional cues. Goal not met consistently. Hearing problems exacerbate  Goal 5: Pt will solve basic problems for ADL's 60% of the time with mod cues. Goal met; will upgrade to <mild external cues  Goal 6: Pt will initiate questions for the SLP in 3/5 exchanges with min-mod cues.  Per 1/21/2020 documentation Goal met: [facility] for initiation and topic maintenance for 20 min duration in structured 1:1 setting  Goal 7: Pt will

## 2020-01-23 NOTE — PLAN OF CARE
Problem: Pain:  Goal: Pain level will decrease  Description  Pain level will decrease  Outcome: Ongoing  Goal: Control of acute pain  Description  Control of acute pain  Outcome: Ongoing  Goal: Control of chronic pain  Description  Control of chronic pain  Outcome: Ongoing     Problem: Falls - Risk of:  Goal: Will remain free from falls  Description bed alarm in place and call light within reach  Will remain free from falls  Outcome: Ongoing  Goal: Absence of physical injury  Description  Absence of physical injury  Outcome: Ongoing     Problem: Discharge Planning:  Goal: Discharged to appropriate level of care  Description  Discharged to appropriate level of care  Outcome: Ongoing

## 2020-01-23 NOTE — PROGRESS NOTES
Occupational Therapy  Facility/Department: [address] MED SURG  Daily Treatment Note  Let this serve as discharge note if patient is discharged prior to next OT session    NAME: Ely Mccall  : 1945  MRN: [id number]    Date of Service: 2020    Discharge Recommendations:  S Level 1, Home with assist PRN     HOME HEALTH CARE: LEVEL 1 STANDARD     -Initial home health evaluation to occur within 24-48 hours, in patient home    -Home health agency to establish plan of care for patient over 60 day period    -Medication Reconciliation    -PCP Visit scheduled within seven days of discharge    -PT/OT to evaluate with goal of regaining prior level of functioning    -OT to evaluate if patient has [address] needs for personal care     [address] scored a 21/24 on the AM-PAC ADL Inpatient form. Current research shows that an AM-PAC score of 18 or greater is typically associated with a discharge to the patient's home setting. Based on the patients AM-PAC score and their current ADL deficits, it is recommended that the patient have 2-3 sessions per week of Occupational Therapy at d/c to increase the patients independence. Assessment   Performance deficits / Impairments: Decreased functional mobility ; Decreased cognition;Decreased ADL status; Decreased safe awareness;Decreased balance;Decreased high-level IADLs  Assessment: Patient continues to make good progress. Patient completed toileting, grooming, and LE dressing with no more than SBA. Patient completed transfers with SBA, fxl mobility with RW with SBA, and fxl mobility without RW with SBA/CGA. Cont per POC.    Treatment Diagnosis: Imp ADLs, IADLs, cognition, functional mobility, transfers and balance due to encephalopathy and fall  Prognosis: Good  OT Education: OT Role;Plan of Care;Transfer Training;ADL Adaptive Strategies  REQUIRES OT FOLLOW UP: Yes  Activity Tolerance: Patient Tolerated treatment well  Safety Devices in place: Yes  Type of devices: Call light within reach; Chair alarm in place; Left in chair;Gait belt;Nurse notified(RN HELEN notified )       Patient Diagnosis(es): The primary encounter diagnosis was Fall in home, initial encounter. Diagnoses of Blood in stool and History of ulcerative colitis were also pertinent to this visit. has a past medical history of Acute MI ([facility] [address] Eczema, [place] (hard of hearing), Hypercholesteremia, Hypertension, Pacemaker, Ulcerative colitis, and Wears hearing aid. has a past surgical history that includes Hysterectomy (1996); Pacemaker insertion; Coronary angioplasty with stent; Ankle fracture surgery (7/26/11); rhinoplasty; Upper gastrointestinal endoscopy (N/A, 1/20/2020); and Colonoscopy (N/A, 1/20/2020). Restrictions  Restrictions/Precautions: Fall Risk  Other position/activity restrictions: [place]    Subjective  Chart Reviewed: Yes  Patient assessed for rehabilitation services?: Yes  Additional Pertinent Hx: \"70 y. o.female With medical history significant for coronary artery disease, ulcerative colitis, anemia and blood transfusions. Patient presented to the emergency room when she was found down by her son in the bathroom floor. Patient could not recall the circumstances around her fall. Patient does report that she has been experiencing Episodes of dizziness. \" Per Dr. Matt Khan 1/16/2020. EEG shows Mild back ground slowing and disorganization in right temporal/occipital region.     No definitive epileptiform activity     Response to previous treatment: Patient with no complaints from previous session  Family / Caregiver Present: No  Referring Practitioner: Dr. Maria Elena Love  Diagnosis: encephalopathy R/O seizures  Subjective: Patient met b/s, lying supine, agreeable to OT, does not report or indicate any pain, very [place] and need to repeat self for patient to understand what is being said/asked     Orientation  Overall Orientation Status: Within Functional Limits  Orientation Level: Oriented to place;Oriented to time;Oriented to situation;Oriented to person    Objective  ADL  Grooming: Supervision(standing at sink for oral care, uses hand santizer in standing to protect IV line in right hand )  LE Dressing: Stand by assistance(don/doff pants )  Toileting: Stand by assistance(depends down/up and hygiene with patient using left hand to protect IV line in right hand )    Balance  Sitting Balance: Independent  Standing Balance: Stand by assistance(SBA/Supervision )    Transfers  Sit to stand: Stand by assistance  Stand to sit: Stand by assistance    Functional Mobility  Functional Mobility Comments: patient completed bed-toilet in BR with RW with SBA, yet patient leaving walker, patient reports she does not use a walker at home and does not plan on using one once home, patient then completed [id number] without RW with SBA, patient then completed fxl mobility in hallway without AD with CGA/SBA     Toilet Transfers  Toilet - Technique: Ambulating  Equipment Used: Grab bars  Toilet Transfer: Stand by assistance    Bed mobility  Supine to Sit: Supervision(HOB up, use of HR )  Scooting: Supervision    Cognition  Cognition Comment: patient following all directions, oriented x 4, appropriate in conversation and when discussing events in the hospital, patient is [facility] and need to ensure fully heard instructions/questions       Plan  Times per week: 3-5  Times per day: Daily  Current Treatment Recommendations: Balance Training, Functional Mobility Training, Cognitive Reorientation, Neuromuscular Re-education, Patient/Caregiver Education & Training, Self-Care / ADL, Home Management Training    Goals  Short term goals  Time Frame for Short term goals: date of d/c  Short term goal 1: SBA UE ADLs  Short term goal 2: CGA/SBA LE ADLs  Short term goal 3: Supv bed mobility  Short term goal 4: SBA functional transfers to bed, chair and toilet with < 3 verbal cues for safety awareness  Short term goal 5: SBA functional mobility in prep for

## 2020-01-23 NOTE — PROGRESS NOTES
Progress Note    Date:1/23/2020       Room:P8U-0047/4120-01  Patient Name:Ashley Choe     YOB: 1945     Age:74 y.o. Subjective   Interval History Status: improved. Pt doing well today eating dinner and no abd pain now. No headaches pt is hard of hearing  No new joint pains  No myalgias  No cough   No congestion      Review of Systems   Review of Systems   Constitutional: Positive for appetite change (much better appetitie than previous days). Negative for chills, fatigue and fever. HENT: Negative for congestion. Respiratory: Negative for cough and shortness of breath. Cardiovascular: Negative for chest pain and palpitations. Gastrointestinal: Negative for abdominal distention, anal bleeding, constipation, diarrhea and vomiting. Genitourinary: Negative for dysuria. Psychiatric/Behavioral: Negative for confusion (doingwell now). Medications   Scheduled Meds:    predniSONE  40 mg Oral Daily    balsalazide  2,250 mg Oral TID    levETIRAcetam  250 mg Oral BID    folic acid  4 mg Oral Daily    vitamin B-12  1,000 mcg Oral Daily    sodium chloride flush  10 mL Intravenous 2 times per day    pantoprazole  40 mg Intravenous BID    atorvastatin  40 mg Oral Nightly    lisinopril  20 mg Oral Daily    metoprolol succinate  25 mg Oral Daily     Continuous Infusions:    sodium chloride 75 mL/hr at 01/23/20 1634     PRN Meds: sodium chloride flush, haloperidol lactate, potassium chloride **OR** potassium alternative oral replacement **OR** potassium chloride, magnesium hydroxide, ondansetron    Past History    Past Medical History:   has a past medical history of Acute MI ([address] Eczema, [place] (hard of hearing), Hypercholesteremia, Hypertension, Pacemaker, Ulcerative colitis, and Wears hearing aid. Social History:   reports that she has never smoked. She has never used smokeless tobacco. She reports that she does not drink alcohol or use drugs.      Family History:   Family History   Problem Relation Age of Onset    Heart Disease Mother     Ulcerative Colitis Father     Ulcerative Colitis Son        Physical Examination      Vitals:  BP (!) 154/89   Pulse 74   Temp 97.6 °F (36.4 °C) (Oral)   Resp 16   Ht 4' 10\" (1.473 m)   Wt 155 lb 13.8 oz (70.7 kg)   SpO2 94%   BMI 32.58 kg/m²   Temp (24hrs), Av.8 °F (36.6 °C), Min:97.6 °F (36.4 °C), Max:98.1 °F (36.7 °C)      I/O (24Hr): Intake/Output Summary (Last 24 hours) at 2020 1750  Last data filed at 2020 1007  Gross per 24 hour   Intake 1643.75 ml   Output --   Net 1643.75 ml       Physical Exam  Constitutional:       General: She is not in acute distress. Appearance: Normal appearance. She is not ill-appearing, toxic-appearing or diaphoretic. HENT:      Head: Normocephalic and atraumatic. Nose: Nose normal. No congestion. Eyes:      Pupils: Pupils are equal, round, and reactive to light. Cardiovascular:      Rate and Rhythm: Normal rate and regular rhythm. Pulses: Normal pulses. Heart sounds: No murmur. Pulmonary:      Effort: No respiratory distress. Breath sounds: No wheezing or rales. Abdominal:      General: Bowel sounds are normal. There is no distension. Palpations: Abdomen is soft. Tenderness: There is no tenderness. There is no guarding or rebound. Musculoskeletal:         General: No swelling. Neurological:      Mental Status: She is alert.    Psychiatric:         Mood and Affect: Mood normal.         Behavior: Behavior normal.         Labs/Imaging/Diagnostics   Labs:  CBC:  Recent Labs     20  0830 20  0547 20  0627   WBC 7.8 16.1* 13.2*   RBC 4.03 3.63* 3.80*   HGB 8.6* 7.5* 8.0*   HCT 27.6* 25.3* 26.6*   MCV 68.6* 69.7* 70.0*   RDW 24.5* 24.6* 24.7*   * 408 398     CHEMISTRIES:  Recent Labs     20  0830 20  0547 20  0627    141 144   K 4.6 3.6 3.0*    109 112*   CO2 20* 20* 20*   BUN 12 16 11 hemoglobin less than 7  - iron transfusions        Acute blood loss anemia  -Secondary to above, continue to monitor  Iron      CAD  -Hold aspirin and Plavix, continue statin, beta-blocker     Hypokalemia  -Replace, continue to monitor, on replacement protocol     Essential hypertension  -BP stable, continue present management, continue to monitor        Diet: DIET LOW FIBER;     Activity: Up with assist  Prophylaxis: SCD, secondary to bleed too risky for chemical prophylaxis    Dc telemetry  Will saline lock iv    Electronically signed by Orin Valdez MD on 1/23/20 at 5:45 PM

## 2020-01-23 NOTE — PROGRESS NOTES
INPATIENT CONSULTATION:    IDENTIFYING DATA/REASON FOR CONSULTATION   PATIENT:  Ramona Elliott  MRN:  [id number]  ADMIT DATE: 1/16/2020  TIME OF EVALUATION: 1/23/2020 11:02 AM  HOSPITAL STAY:   LOS: 7 days   CONSULTING PHYSICIAN: Padmaja Osorio MD   REASON FOR CONSULTATION: rectal bleeding    Subjective:    Pt reports she had 2 loose BMs overnight. She denies abdominal pain    MEDICATIONS   SCHEDULED:  predniSONE, 40 mg, Daily  balsalazide, 2,250 mg, TID  ppd, , Once  levETIRAcetam, 900 mg, BID  folic acid, 4 mg, Daily  vitamin B-12, 1,000 mcg, Daily  sodium chloride flush, 10 mL, 2 times per day  pantoprazole, 40 mg, BID  atorvastatin, 40 mg, Nightly  lisinopril, 20 mg, Daily  metoprolol succinate, 25 mg, Daily      FLUIDS/DRIPS:     sodium chloride 75 mL/hr at 01/23/20 8827     PRNs: sodium chloride flush, 10 mL, PRN  haloperidol lactate, 1 mg, Q4H PRN  potassium chloride, 40 mEq, PRN    Or  potassium alternative oral replacement, 40 mEq, PRN    Or  potassium chloride, 10 mEq, PRN  magnesium hydroxide, 30 mL, Daily PRN  ondansetron, 4 mg, Q6H PRN      ALLERGIES:    Allergies   Allergen Reactions    Cortisone Other (See Comments)     Injection site site sunk in    Percocet [Oxycodone-Acetaminophen] Nausea And Vomiting         PHYSICAL EXAM     Vitals:    01/23/20 0008 01/23/20 0424 01/23/20 0939 01/23/20 1007   BP: (!) 151/76 (!) 164/89 (!) 143/81    Pulse: 69 63  72   Resp: 18 16     Temp: 97.6 °F (36.4 °C) 97.6 °F (36.4 °C)     TempSrc: Oral Oral     SpO2: 97% 94%     Weight:  155 lb 13.8 oz (70.7 kg)     Height:           I/O last 3 completed shifts: In: 2893.8 [P.O.:840; I.V.:2053.8]  Out: -     Physical Exam:  General appearance: alert, cooperative, no distress  Eyes: Anicteric  Head: Normocephalic, without obvious abnormality  Lungs: clear to auscultation bilaterally, Normal Effort  Heart: regular rate and rhythm, normal S1 and S2, no murmurs or rubs  Abdomen: soft, non-distended, non-tender.  Bowel sounds normal. No masses,  no organomegaly. Extremities: atraumatic, no cyanosis or edema  Skin: warm and dry, no jaundice  Neuro: Grossly intact, A&OX3    LABS AND IMAGING   Laboratory   Recent Labs     01/21/20  0830 01/22/20  0547 01/23/20  0627   WBC 7.8 16.1* 13.2*   HGB 8.6* 7.5* 8.0*   HCT 27.6* 25.3* 26.6*   MCV 68.6* 69.7* 70.0*   * 408 398     Recent Labs     01/21/20  0830 01/22/20  0547 01/23/20  0627    141 144   K 4.6 3.6 3.0*    109 112*   CO2 20* 20* 20*   BUN 12 16 11   CREATININE 0.6 0.5* 0.5*     No results for input(s): AST, ALT, ALB, BILIDIR, BILITOT, ALKPHOS in the last 72 hours. No results for input(s): LIPASE, AMYLASE in the last 72 hours. No results for input(s): PROTIME, INR in the last 72 hours. Imaging  VL DUP CAROTID BILATERAL   Final Result      CT Head WO Contrast   Final Result   1. No acute intracranial hemorrhage   2. Focal area of encephalomalacia has developed involving the right   parietal-occipital region consistent with remote infarct. This is new from   prior CT of February 2018. 3. Stable chronic ischemic changes involving the periventricular white matter   and the right basal ganglia. CT Cervical Spine WO Contrast   Final Result   No radiographic findings to suggest presence of acute fracture of the   cervical spine. XR CHEST STANDARD (2 VW)   Final Result   No acute pulmonary disease. Endoscopy  EGD/colonoscopy 1/20/20 with Dr. Brii Longoria  1) 5cm hiatal hernia. 2) Erosive gastritis of the antrum. Biopsies were obtained from the antrum and body of the stomach to rule out active gastritis and H.pylori gastritis. 3) Normal duodenum. Cold forceps biopsies were performed to rule out celiac disease. 4) Moderate colitis with erythema, edema, and ulcerations from the rectum to the hepatic flexure. Cold forceps biopsies were obtained and stool studies collected.    5) Normal colonic mucosa in the right colon and

## 2020-01-24 LAB
ANION GAP SERPL CALCULATED.3IONS-SCNC: 12 MMOL/L (ref 3–16)
BASOPHILS ABSOLUTE: 0 K/UL (ref 0–0.2)
BASOPHILS RELATIVE PERCENT: 0.1 %
BUN BLDV-MCNC: 11 MG/DL (ref 7–20)
CALCIUM SERPL-MCNC: 8.3 MG/DL (ref 8.3–10.6)
CHLORIDE BLD-SCNC: 107 MMOL/L (ref 99–110)
CO2: 24 MMOL/L (ref 21–32)
CREAT SERPL-MCNC: 0.5 MG/DL (ref 0.6–1.2)
EOSINOPHILS ABSOLUTE: 0.2 K/UL (ref 0–0.6)
EOSINOPHILS RELATIVE PERCENT: 1.2 %
GFR AFRICAN AMERICAN: >60
GFR NON-AFRICAN AMERICAN: >60
GLUCOSE BLD-MCNC: 83 MG/DL (ref 70–99)
HCT VFR BLD CALC: 27.6 % (ref 36–48)
HEMOGLOBIN: 8.6 G/DL (ref 12–16)
LYMPHOCYTES ABSOLUTE: 4.1 K/UL (ref 1–5.1)
LYMPHOCYTES RELATIVE PERCENT: 26.8 %
MCH RBC QN AUTO: 21.7 PG (ref 26–34)
MCHC RBC AUTO-ENTMCNC: 31 G/DL (ref 31–36)
MCV RBC AUTO: 70.1 FL (ref 80–100)
MONOCYTES ABSOLUTE: 1.5 K/UL (ref 0–1.3)
MONOCYTES RELATIVE PERCENT: 9.9 %
NEUTROPHILS ABSOLUTE: 9.3 K/UL (ref 1.7–7.7)
NEUTROPHILS RELATIVE PERCENT: 62 %
PDW BLD-RTO: 24.9 % (ref 12.4–15.4)
PLATELET # BLD: 418 K/UL (ref 135–450)
PMV BLD AUTO: 6.6 FL (ref 5–10.5)
POTASSIUM SERPL-SCNC: 2.6 MMOL/L (ref 3.5–5.1)
POTASSIUM SERPL-SCNC: 3.7 MMOL/L (ref 3.5–5.1)
RBC # BLD: 3.93 M/UL (ref 4–5.2)
SODIUM BLD-SCNC: 143 MMOL/L (ref 136–145)
WBC # BLD: 15.1 K/UL (ref 4–11)

## 2020-01-24 PROCEDURE — 1200000000 HC SEMI PRIVATE

## 2020-01-24 PROCEDURE — C9113 INJ PANTOPRAZOLE SODIUM, VIA: HCPCS | Performed by: INTERNAL MEDICINE

## 2020-01-24 PROCEDURE — 6370000000 HC RX 637 (ALT 250 FOR IP): Performed by: NURSE PRACTITIONER

## 2020-01-24 PROCEDURE — 86480 TB TEST CELL IMMUN MEASURE: CPT

## 2020-01-24 PROCEDURE — 97530 THERAPEUTIC ACTIVITIES: CPT

## 2020-01-24 PROCEDURE — 6370000000 HC RX 637 (ALT 250 FOR IP): Performed by: INTERNAL MEDICINE

## 2020-01-24 PROCEDURE — 6370000000 HC RX 637 (ALT 250 FOR IP): Performed by: PHYSICIAN ASSISTANT

## 2020-01-24 PROCEDURE — 6360000002 HC RX W HCPCS: Performed by: INTERNAL MEDICINE

## 2020-01-24 PROCEDURE — 80048 BASIC METABOLIC PNL TOTAL CA: CPT

## 2020-01-24 PROCEDURE — 97116 GAIT TRAINING THERAPY: CPT

## 2020-01-24 PROCEDURE — 85025 COMPLETE CBC W/AUTO DIFF WBC: CPT

## 2020-01-24 PROCEDURE — 97535 SELF CARE MNGMENT TRAINING: CPT

## 2020-01-24 PROCEDURE — 97130 THER IVNTJ EA ADDL 15 MIN: CPT

## 2020-01-24 PROCEDURE — 2580000003 HC RX 258: Performed by: INTERNAL MEDICINE

## 2020-01-24 PROCEDURE — 97129 THER IVNTJ 1ST 15 MIN: CPT

## 2020-01-24 PROCEDURE — 36415 COLL VENOUS BLD VENIPUNCTURE: CPT

## 2020-01-24 PROCEDURE — 84132 ASSAY OF SERUM POTASSIUM: CPT

## 2020-01-24 RX ORDER — POTASSIUM CHLORIDE 750 MG/1
40 TABLET, FILM COATED, EXTENDED RELEASE ORAL
Status: DISCONTINUED | OUTPATIENT
Start: 2020-01-24 | End: 2020-01-24

## 2020-01-24 RX ORDER — LOPERAMIDE HYDROCHLORIDE 2 MG/1
2 CAPSULE ORAL ONCE
Status: COMPLETED | OUTPATIENT
Start: 2020-01-24 | End: 2020-01-24

## 2020-01-24 RX ORDER — CLOPIDOGREL BISULFATE 75 MG/1
75 TABLET ORAL DAILY
Status: DISCONTINUED | OUTPATIENT
Start: 2020-01-24 | End: 2020-01-25 | Stop reason: HOSPADM

## 2020-01-24 RX ORDER — POTASSIUM CHLORIDE 750 MG/1
40 TABLET, FILM COATED, EXTENDED RELEASE ORAL
Status: DISCONTINUED | OUTPATIENT
Start: 2020-01-24 | End: 2020-01-25 | Stop reason: HOSPADM

## 2020-01-24 RX ADMIN — LEVETIRACETAM 250 MG: 250 TABLET ORAL at 20:25

## 2020-01-24 RX ADMIN — ATORVASTATIN CALCIUM 40 MG: 40 TABLET, FILM COATED ORAL at 20:25

## 2020-01-24 RX ADMIN — POTASSIUM CHLORIDE 40 MEQ: 750 TABLET, FILM COATED, EXTENDED RELEASE ORAL at 18:12

## 2020-01-24 RX ADMIN — METOPROLOL SUCCINATE 25 MG: 25 TABLET, EXTENDED RELEASE ORAL at 09:56

## 2020-01-24 RX ADMIN — PREDNISONE 40 MG: 20 TABLET ORAL at 09:56

## 2020-01-24 RX ADMIN — PANTOPRAZOLE SODIUM 40 MG: 40 INJECTION, POWDER, FOR SOLUTION INTRAVENOUS at 11:11

## 2020-01-24 RX ADMIN — BALSALAZIDE DISODIUM 2250 MG: 750 CAPSULE ORAL at 09:56

## 2020-01-24 RX ADMIN — CYANOCOBALAMIN TAB 1000 MCG 1000 MCG: 1000 TAB at 09:56

## 2020-01-24 RX ADMIN — BALSALAZIDE DISODIUM 2250 MG: 750 CAPSULE ORAL at 14:24

## 2020-01-24 RX ADMIN — CLOPIDOGREL BISULFATE 75 MG: 75 TABLET ORAL at 11:49

## 2020-01-24 RX ADMIN — LISINOPRIL 20 MG: 20 TABLET ORAL at 09:56

## 2020-01-24 RX ADMIN — POTASSIUM CHLORIDE 40 MEQ: 750 TABLET, FILM COATED, EXTENDED RELEASE ORAL at 11:49

## 2020-01-24 RX ADMIN — SODIUM CHLORIDE, PRESERVATIVE FREE 10 ML: 5 INJECTION INTRAVENOUS at 20:25

## 2020-01-24 RX ADMIN — POTASSIUM CHLORIDE 10 MEQ: 7.46 INJECTION, SOLUTION INTRAVENOUS at 09:57

## 2020-01-24 RX ADMIN — BALSALAZIDE DISODIUM 2250 MG: 750 CAPSULE ORAL at 20:25

## 2020-01-24 RX ADMIN — LOPERAMIDE HYDROCHLORIDE 2 MG: 2 CAPSULE ORAL at 11:49

## 2020-01-24 RX ADMIN — LEVETIRACETAM 250 MG: 250 TABLET ORAL at 09:56

## 2020-01-24 RX ADMIN — FOLIC ACID 4 MG: 1 TABLET ORAL at 09:56

## 2020-01-24 RX ADMIN — SODIUM CHLORIDE, PRESERVATIVE FREE 10 ML: 5 INJECTION INTRAVENOUS at 09:57

## 2020-01-24 RX ADMIN — PANTOPRAZOLE SODIUM 40 MG: 40 INJECTION, POWDER, FOR SOLUTION INTRAVENOUS at 20:25

## 2020-01-24 ASSESSMENT — PAIN SCALES - WONG BAKER
WONGBAKER_NUMERICALRESPONSE: 0

## 2020-01-24 ASSESSMENT — PAIN SCALES - GENERAL
PAINLEVEL_OUTOF10: 0

## 2020-01-24 NOTE — PROGRESS NOTES
mg Oral Nightly    lisinopril  20 mg Oral Daily    metoprolol succinate  25 mg Oral Daily     Continuous Infusions:  PRN Meds:.sodium chloride flush, potassium chloride **OR** potassium alternative oral replacement **OR** potassium chloride, magnesium hydroxide, ondansetron    PHYSICAL EXAM:  BP (!) 158/88   Pulse 65   Temp 97.8 °F (36.6 °C) (Oral)   Resp 15   Ht 4' 10\" (1.473 m)   Wt 150 lb 2.1 oz (68.1 kg)   SpO2 95%   BMI 31.38 kg/m²     No intake or output data in the 24 hours ending 01/24/20 1944    General: Alert and oriented. Resting in bed in no apparent distress. HEENT: Normocephalic. Atraumatic. Pupils equal and reactive. EOM intact. Oral mucosa pink/moist/intact. Very [place]  Neck: Supple. Symmetrical. Trachea midline. Lungs: Clear to auscultation bilaterally. Respirations even and unlabored. Chest: Exam unremarkable. Cardiac: S1/S2 noted. Regular Rhythm and rate. Abdomen/GI: Soft. Non-tender. Non-distended. BS+. Extremities: PP+. Atraumatic. No redness/cyanosis/edema noted. Brisk cap refill. Skin: Dry and intact. No lesions noted. Neuro: Grossly intact. No focal deficits noted. LABS:    Lab Results   Component Value Date    WBC 15.1 (H) 01/24/2020    HGB 8.6 (L) 01/24/2020    HCT 27.6 (L) 01/24/2020    MCV 70.1 (L) 01/24/2020     01/24/2020    LYMPHOPCT 26.8 01/24/2020    RBC 3.93 (L) 01/24/2020    MCH 21.7 (L) 01/24/2020    MCHC 31.0 01/24/2020    RDW 24.9 (H) 01/24/2020       Lab Results   Component Value Date    CREATININE 0.5 (L) 01/24/2020    BUN 11 01/24/2020     01/24/2020    K 2.6 (LL) 01/24/2020     01/24/2020    CO2 24 01/24/2020       Lab Results   Component Value Date    MG 2.00 01/20/2020       Lab Results   Component Value Date    ALT 10 01/16/2020    AST 15 01/16/2020    ALKPHOS 124 01/16/2020    BILITOT 0.3 01/16/2020        No flowsheet data found. Imaging:  VL DUP CAROTID BILATERAL   Final Result      CT Head WO Contrast   Final Result   1.  No acute intracranial hemorrhage   2. Focal area of encephalomalacia has developed involving the right   parietal-occipital region consistent with remote infarct. This is new from   prior CT of February 2018. 3. Stable chronic ischemic changes involving the periventricular white matter   and the right basal ganglia. CT Cervical Spine WO Contrast   Final Result   No radiographic findings to suggest presence of acute fracture of the   cervical spine. XR CHEST STANDARD (2 VW)   Final Result   No acute pulmonary disease. Assessment & Plan:        Syncope and collapse  -ET head negative  -Unable to get MRI secondary to pacemaker  -Echo showing grade 1 diastolic heart failure   -Continue Plavix and statin  - could be related to hypokalemia and UC    Acute on chronic ulcerative colitis  -Patient status post EGD and colonoscopy with biopsies - has erosive gastritis of the antrum, no H. Pylori  -Started on balsalazide  -GI following closely  -Continue steroids    Possible GI bleed  -Likely related to #2  -Monitor hemoglobin  -On PPI, prednisone and balsalazide  -K to continue Plavix today and will monitor closely  -Transfuse for hemoglobin less than 7  -Continue iron transfusions    Acute blood loss anemia  -Likely related to #2  -As above    Hypokalemia  -Supplement and repeat    Other comorbidities:  CAD  Essential hypertension  Obesity    Body mass index is 31.38 kg/m². The patient and / or the family were informed of the results of any tests, a time was given to answer questions, a plan was proposed and they agreed with plan.     DVT prophylaxis: [] Lovenox  [] SQ Heparin  [x] SCDs because of  [] warfarin/oral direct thrombin inhibitor [] Encourage ambulation    GI prophylaxis: [x] PPI/[id number]  [] not indicated    Probiotic if on abx: [] Yes [] No [x] Not Indicated    Diet: DIET LOW FIBER;    Consults:  IP CONSULT TO NEUROLOGY  IP CONSULT TO GI  IP CONSULT TO GI    Disposition:  [] Home [] Home with home health [] Rehab [] Psych [] SNF  [] LTAC  [] Long term nursing home or group home [] Transfer to ICU  [] Transfer to PCU [] Other:    Code Status: Full Code    ELOS: tbd      ROBERT Romero - OSCAR  01/24/20

## 2020-01-24 NOTE — PROGRESS NOTES
Speech Language Pathology  Facility/Department: [facility] SURG  Speech/Language/Cognitive Daily Treatment Note    NAME: Reese Ashby  :    MRN: [id number]  ADMISSION DATE: 2020  ADMITTING DIAGNOSIS: The primary encounter diagnosis was Fall in home, initial encounter. Neuro Work up in progress. Diagnoses of Blood in stool and History of ulcerative colitis were also pertinent to this visit. · has Ankle fracture; CAD (coronary artery disease); Pacemaker; HTN (hypertension); Hypercholesteremia; UC (ulcerative colitis) ([address] Intractable headache; Change in mental status; and Acute blood loss anemia on their problem list.  DATE ONSET: 2020      Chart Review:  · MD History and Physical Documentation revealed: s/p Fall ; Neuro Work up  The primary encounter diagnosis was Fall in home, initial encounter. Neuro Work up in progress. Diagnoses of Blood in stool and History of ulcerative colitis were also pertinent to this visit  · RECENT RESULTS  CT OF HEAD/MRI:  2020:     1. No acute intracranial hemorrhage   2. Focal area of encephalomalacia has developed involving the right   parietal-occipital region consistent with remote infarct.  This is new from   prior CT of 2018. 3. Stable chronic ischemic changes involving the periventricular white matter   and the right basal ganglia.       ·  has a past medical history of Acute MI ([facility] [address] (), Eczema, [place] (hard of hearing), Hypercholesteremia, Hypertension, Pacemaker, Ulcerative colitis (), and Wears hearing aid. · Lives with spouse. Pt self report is that she participates in home management and shares responsibilities with son. · 2020 Colonoscopy and EGD  · 2020: SNF versus home with [place] therapies being considered  ·  ECF stay reportedly denied; likely plan for DC home    Subjective:  Pt pleasant and cooperative, but verbalizing she either got hung up on by her son or they got disconnected.   She stated \"I

## 2020-01-24 NOTE — PROGRESS NOTES
performed.      A. Small bowel, biopsy:       - Small bowel mucosa with no pathologic change       - No evidence of villous blunting or significantly increased         intraepithelial lymphocytes       - No evidence of dysplasia or malignancy     B. Stomach, antrum, biopsy:       - Mild chronic inactive gastritis       - No evidence of intestinal metaplasia, dysplasia, or malignancy       - No morphologic evidence of helicobacter pylori infection     C. Colon, right, biopsy:       - Colonic mucosa with no pathologic change       - No evidence of dysplasia or malignancy       - No evidence of active inflammation     D. Colon, left, biopsy:       - Severe chronic active colitis       - No evidence of granulomas, dysplasia or malignancy       - CMV immunohistochemical staining with appropriate controls is         negative      Colonoscopy 12/11/2019 with Dr. Moriah Cerda  1. Ulcerative pan-colitis, mild in right colon and moderate/severe in the left colon  2. Random biopsies obtained  3. Normal terminal ileum  4. Mild sigmoid colon diverticulosis  5. Small internal hemorrhoids  A.  Right colon, biopsy:  - One colonic fragment with mild chronic active colitis. - Remaining colonic fragments with no significant abnormality.  - No dysplasia identified. B.  Left colon, biopsy:  - Moderate to severe chronic active colitis. - No dysplasia identified. ASSESSMENT AND RECOMMENDATIONS   Ely Mccall is a 69 y.o. female with PMH of UC (initially dx 2003 with colonoscopy but has not been on long term medications), CAD on plavix, pacemaker, HTN, HLD who presented 1/16/20 with syncope, weakness. Found to be anemic. Recently admitted to [facility] with rectal bleeding and anemia, colonoscopy 12/11 showed ulcerative pan-colitis with bx showing moderate to severe chronic active colitis. Also found with E Coli +stool studies. She was treated with antibiotics for suspected actue infectious colitis vs UC.   This admission repeat colonoscopy showing moderated colitis from rectum to hepatic flexure.  Biopsies pending. PPD negative. Recommend Prednisone 40 mg daily/Balsalazide. Diarrhea controlled. Ok to resume Plavix from a GI standpoint. Outpatient follow-up at [place]. Will sign off. Thank you for allowing me to participate in this patient's care. If there are any questions or concerns regarding this patient, or the plan we have set in place, please feel free to contact me at [phone].      Glen Guajardo MD

## 2020-01-24 NOTE — PLAN OF CARE
Problem: Pain:  Goal: Pain level will decrease  Description  Pain level will decrease  Outcome: Ongoing     Problem: Falls - Risk of:  Goal: Will remain free from falls  Description  Will remain free from falls  Outcome: Ongoing     Problem: Discharge Planning:  Goal: Discharged to appropriate level of care  Description  Discharged to appropriate level of care  Outcome: Ongoing

## 2020-01-24 NOTE — PROGRESS NOTES
assistance  Functional Mobility Comments: Pt completed fxl mobility to/from BR with no AD and SBA, no LOB noted. Bed mobility  Rolling to Left: Modified independent  Rolling to Right: Modified independent  Supine to Sit: Modified independent  Sit to Supine: Independent(HOB flat no rail)  Scooting: Independent     Transfers  Sit to stand: Stand by assistance  Stand to sit: Stand by assistance     Cognition  Overall Cognitive Status: Exceptions  Safety Judgement: Decreased awareness of need for safety;Decreased awareness of need for assistance  Problem Solving: Decreased awareness of errors;Assistance required to identify errors made  Insights: Decreased awareness of deficits                                         Plan   Plan  Times per week: 3-5  Times per day: Daily  Current Treatment Recommendations: Balance Training, Functional Mobility Training, Cognitive Reorientation, Neuromuscular Re-education, Patient/Caregiver Education & Training, Self-Care / ADL, Home Management Training                           AM-PAC Score        AM-[facility] Inpatient Daily Activity Raw Score: 21 (01/24/20 1540)  AM-PAC Inpatient ADL T-Scale Score : 44.27 (01/24/20 1540)  ADL Inpatient CMS 0-100% Score: 32.79 (01/24/20 1540)  ADL Inpatient CMS G-Code Modifier : Desiree Hooper (01/24/20 1540)    Goals  Short term goals  Time Frame for Short term goals: date of d/c STATUS GOALS 1/24: ALL GOALS ONGOING  Short term goal 1: SBA UE ADLs  Short term goal 2: CGA/SBA LE ADLs  Short term goal 3: Supv bed mobility  Short term goal 4: SBA functional transfers to bed, chair and toilet with < 3 verbal cues for safety awareness  Short term goal 5: SBA functional mobility in prep for simple food prep with <3 verbal cues for  safety awareness  Long term goals  Time Frame for Long term goals : TBD  Patient Goals   Patient goals : to return home.         Therapy Time   Individual Concurrent Group Co-treatment   Time In 1010         Time Out 1049         Minutes 39 This note to serve as OT d/c summary if pt is d/c-ed prior to next therapy session.     Elisa Guevara, OTR/L [id number]

## 2020-01-24 NOTE — CARE COORDINATION
SW placed phone call to patient's  son at number listed on file of [phone]. SW informed that [facility] just called and stated that [facility] denied her pre-cert stating that she needs to do rehab at home instead. SW left message today asking if they had experience working with a particular company so we could start the referral process. SW to wait to hear back from son. Respectfully submitted,    TARA Bunch  [facility]   [phone]    Electronically signed by TARA Mckeon on 1/24/2020 at 1:46 PM

## 2020-01-24 NOTE — PLAN OF CARE
Problem: Pain:  Goal: Control of acute pain  Description  Control of acute pain  1/24/2020 1212 by Michelle Smalls RN  Outcome: Ongoing  Note:   Pain/discomfort being managed with PRN analgesics per MD orders. Pt able to express presence and absence of pain and rate pain appropriately using numerical scale. Problem: Falls - Risk of:  Goal: Will remain free from falls  Description  Will remain free from falls  1/24/2020 1212 by Michelle Smalls RN  Outcome: Ongoing  Note:   Pt free from falls this shift. Fall precautions in place at all times. Call light always within reach. Pt able and agreeable to contact for safety appropriately. Problem: Discharge Planning:  Goal: Discharged to appropriate level of care  Description  Discharged to appropriate level of care  1/24/2020 1212 by Benjamin Perry RN  Outcome: Ongoing  Note:   Case management and or  will be consulted to assist pt with any discharge needs for home.

## 2020-01-24 NOTE — PROGRESS NOTES
Physical Therapy  Facility/Department: [address] MED SURG  Daily Treatment Note  NAME: Neisha Gillette  : 1945  MRN: [id number]    Date of Service: 2020    Discharge Recommendations:  Home with assist PRN, S Level 1   PT Equipment Recommendations  Equipment Needed: No    Assessment   Body structures, Functions, Activity limitations: Decreased functional mobility ; Decreased strength;Decreased endurance;Decreased safe awareness;Decreased balance;Decreased cognition  Assessment: Patient is a 69 y.o. female admitted after a unwitnessed fall at home with dizziness. CT shows no intracranial hemorrhage and a remote infarct in the parietal/occciptal region. Pt unable to have MRI due to pacemaker. Prior to admit, patient reportedly lived with son, and was independent with ADLs, transfers, an home ambulation without device. At 2020 session, pt required Supervision to modified independent for bed mobility, and transfers. S for transfers, and S for ambulation [de-identified]' with no AD and no LOB. Patient orientation status is much improved as compared to admission, pt A ond O x 4 today. Patient appears appropriate for DC to home with family support, and home PT, level 1. Will continue to see while in the hospital. Longterm, it may be benificial to caregiver and patient to access community services as needed. Treatment Diagnosis: Impaired functional mobility  Prognosis: Good  REQUIRES PT FOLLOW UP: Yes  Activity Tolerance  Activity Tolerance: Patient Tolerated treatment well     Patient Diagnosis(es): The primary encounter diagnosis was Fall in home, initial encounter. Diagnoses of Blood in stool and History of ulcerative colitis were also pertinent to this visit. has a past medical history of Acute MI ([facility] [address] Eczema, [place] (hard of hearing), Hypercholesteremia, Hypertension, Pacemaker, Ulcerative colitis, and Wears hearing aid. has a past surgical history that includes Hysterectomy ();  Pacemaker insertion; Coronary angioplasty with stent; Ankle fracture surgery (7/26/11); rhinoplasty; Upper gastrointestinal endoscopy (N/A, 1/20/2020); and Colonoscopy (N/A, 1/20/2020). Restrictions  Restrictions/Precautions  Restrictions/Precautions: Fall Risk  Position Activity Restriction  Other position/activity restrictions: [place]  Subjective   General  Chart Reviewed: Yes  Additional Pertinent Hx: Aleksandra Mathias is a 69 y.o. F admit 1/16/20 with fall; pt's son found her down on her bathroom floor. Pt with bloody stool in the context of ulcerative colitis history: hemoglobin dropped from 10.4 to 8, hemoccult +. Pt reports she has been having dizziness recently. Head CT: \"Focal area of encephalomalacia in the right parietal occipital region consistent w/ remote infarct (new from 2018)\". Other PMHx as noted. Response To Previous Treatment: Patient with no complaints from previous session. Family / Caregiver Present: No  Referring Practitioner: Wing Sandy MD  Subjective  Subjective: Patient in bed. Awake. Agreeable to therapy but having diarrhea  Pain Screening  Patient Currently in Pain: No  Vital Signs  Patient Currently in Pain: No       Orientation  Orientation  Overall Orientation Status: Within Functional Limits  Cognition      Objective   Bed mobility  Rolling to Left: Modified independent  Rolling to Right: Modified independent  Supine to Sit: Modified independent  Sit to Supine: Modified independent  Transfers  Sit to Stand: Modified independent  Stand to sit: Modified independent  Ambulation  Ambulation?: Yes  More Ambulation?: No  Ambulation 1  Surface: level tile  Device: No Device  Assistance: Supervision  Quality of Gait: Step through pattern with more normalized base of support, but fairly consistent gait path deviation trace L throughout.    Gait Deviations: Slow [place];Decreased step length;Decreased step height  Distance: [de-identified]' in room   Pt requested commode due to diarrhea: toilet transfer S, toileting I.  Balance  Posture: Good  Sitting - Static: Good  Sitting - Dynamic: Good  Standing - Static: Good  Standing - Dynamic: Good        AM-PAC Score  AM-PAC Inpatient Mobility Raw Score : 23 (01/24/20 0931)  AM-PAC Inpatient T-Scale Score : 56.93 (01/24/20 0931)  Mobility Inpatient CMS 0-100% Score: 11.2 (01/24/20 0931)  Mobility Inpatient CMS G-Code Modifier : CI (01/24/20 0931)     Goals  Short term goals  Time Frame for Short term goals: upon discharge  Short term goal 1: Bed mobility Supervision. 1-: goal met  Short term goal 2: Transfers Supervision met 1-24  Short term goal 3: Ambulate 150 feet without A. D. with Supervision   Short term goal 4: Up/down one stair with CGA  Patient Goals   Patient goals : pt was unable to state a goal    Plan    Plan  Times per week: 3-5  Plan weeks: 1-2   Current Treatment Recommendations: Strengthening, Transfer Training, Endurance Training, Patient/Caregiver Education & Training, Equipment Evaluation, Education, & procurement, Gait Training, Balance Training, Home Exercise Program, Functional Mobility Training, Stair training, Safety Education & Training  Safety Devices  Type of devices: All fall risk precautions in place, Gait belt, Patient at risk for falls, Nurse notified, Call light within reach, Telesitter in use, Left in bed  Restraints  Initially in place: No     Therapy Time   Individual Concurrent Group Co-treatment   Time In 0900         Time Out 0930         Minutes 30         Timed Code Treatment Minutes: 30 Minutes     If pt is discharged before subsequent therapy sessions, this note will serve as the discharge summary.     Edy Joseph, [address]

## 2020-01-25 VITALS
HEART RATE: 68 BPM | WEIGHT: 147.49 LBS | TEMPERATURE: 97.7 F | DIASTOLIC BLOOD PRESSURE: 97 MMHG | BODY MASS INDEX: 30.96 KG/M2 | OXYGEN SATURATION: 94 % | HEIGHT: 58 IN | RESPIRATION RATE: 18 BRPM | SYSTOLIC BLOOD PRESSURE: 153 MMHG

## 2020-01-25 LAB
ANION GAP SERPL CALCULATED.3IONS-SCNC: 13 MMOL/L (ref 3–16)
BASOPHILS ABSOLUTE: 0 K/UL (ref 0–0.2)
BASOPHILS RELATIVE PERCENT: 0.2 %
BUN BLDV-MCNC: 13 MG/DL (ref 7–20)
CALCIUM SERPL-MCNC: 8.3 MG/DL (ref 8.3–10.6)
CHLORIDE BLD-SCNC: 103 MMOL/L (ref 99–110)
CO2: 25 MMOL/L (ref 21–32)
CREAT SERPL-MCNC: 0.5 MG/DL (ref 0.6–1.2)
EOSINOPHILS ABSOLUTE: 0.2 K/UL (ref 0–0.6)
EOSINOPHILS RELATIVE PERCENT: 1.2 %
GFR AFRICAN AMERICAN: >60
GFR NON-AFRICAN AMERICAN: >60
GLUCOSE BLD-MCNC: 92 MG/DL (ref 70–99)
HCT VFR BLD CALC: 28.3 % (ref 36–48)
HEMOGLOBIN: 8.7 G/DL (ref 12–16)
LYMPHOCYTES ABSOLUTE: 4.1 K/UL (ref 1–5.1)
LYMPHOCYTES RELATIVE PERCENT: 27.3 %
MCH RBC QN AUTO: 21.9 PG (ref 26–34)
MCHC RBC AUTO-ENTMCNC: 30.9 G/DL (ref 31–36)
MCV RBC AUTO: 70.8 FL (ref 80–100)
MONOCYTES ABSOLUTE: 1.2 K/UL (ref 0–1.3)
MONOCYTES RELATIVE PERCENT: 8.1 %
NEUTROPHILS ABSOLUTE: 9.4 K/UL (ref 1.7–7.7)
NEUTROPHILS RELATIVE PERCENT: 63.2 %
PDW BLD-RTO: 25.2 % (ref 12.4–15.4)
PLATELET # BLD: 417 K/UL (ref 135–450)
PMV BLD AUTO: 6.5 FL (ref 5–10.5)
POTASSIUM SERPL-SCNC: 3.1 MMOL/L (ref 3.5–5.1)
RBC # BLD: 3.99 M/UL (ref 4–5.2)
SODIUM BLD-SCNC: 141 MMOL/L (ref 136–145)
WBC # BLD: 15 K/UL (ref 4–11)

## 2020-01-25 PROCEDURE — 6370000000 HC RX 637 (ALT 250 FOR IP): Performed by: INTERNAL MEDICINE

## 2020-01-25 PROCEDURE — 2580000003 HC RX 258: Performed by: INTERNAL MEDICINE

## 2020-01-25 PROCEDURE — 6370000000 HC RX 637 (ALT 250 FOR IP): Performed by: PHYSICIAN ASSISTANT

## 2020-01-25 PROCEDURE — 80048 BASIC METABOLIC PNL TOTAL CA: CPT

## 2020-01-25 PROCEDURE — 6360000002 HC RX W HCPCS: Performed by: INTERNAL MEDICINE

## 2020-01-25 PROCEDURE — 6370000000 HC RX 637 (ALT 250 FOR IP): Performed by: NURSE PRACTITIONER

## 2020-01-25 PROCEDURE — 94760 N-INVAS EAR/PLS OXIMETRY 1: CPT

## 2020-01-25 PROCEDURE — 85025 COMPLETE CBC W/AUTO DIFF WBC: CPT

## 2020-01-25 PROCEDURE — 36415 COLL VENOUS BLD VENIPUNCTURE: CPT

## 2020-01-25 PROCEDURE — C9113 INJ PANTOPRAZOLE SODIUM, VIA: HCPCS | Performed by: INTERNAL MEDICINE

## 2020-01-25 RX ORDER — BALSALAZIDE DISODIUM 750 MG/1
2250 CAPSULE ORAL 3 TIMES DAILY
Qty: 270 CAPSULE | Refills: 0 | Status: SHIPPED | OUTPATIENT
Start: 2020-01-25 | End: 2021-07-28

## 2020-01-25 RX ORDER — PREDNISONE 20 MG/1
40 TABLET ORAL DAILY
Qty: 108 TABLET | Refills: 0 | Status: SHIPPED | OUTPATIENT
Start: 2020-01-26 | End: 2020-03-20

## 2020-01-25 RX ORDER — PANTOPRAZOLE SODIUM 40 MG/1
40 TABLET, DELAYED RELEASE ORAL
Qty: 180 TABLET | Refills: 1 | Status: SHIPPED | OUTPATIENT
Start: 2020-01-25

## 2020-01-25 RX ORDER — LEVETIRACETAM 250 MG/1
250 TABLET ORAL 2 TIMES DAILY
Qty: 60 TABLET | Refills: 3 | Status: SHIPPED | OUTPATIENT
Start: 2020-01-25

## 2020-01-25 RX ADMIN — POTASSIUM CHLORIDE 40 MEQ: 750 TABLET, FILM COATED, EXTENDED RELEASE ORAL at 13:17

## 2020-01-25 RX ADMIN — POTASSIUM CHLORIDE 40 MEQ: 750 TABLET, FILM COATED, EXTENDED RELEASE ORAL at 17:29

## 2020-01-25 RX ADMIN — BALSALAZIDE DISODIUM 2250 MG: 750 CAPSULE ORAL at 13:22

## 2020-01-25 RX ADMIN — LISINOPRIL 20 MG: 20 TABLET ORAL at 08:58

## 2020-01-25 RX ADMIN — BALSALAZIDE DISODIUM 2250 MG: 750 CAPSULE ORAL at 08:58

## 2020-01-25 RX ADMIN — CLOPIDOGREL BISULFATE 75 MG: 75 TABLET ORAL at 08:58

## 2020-01-25 RX ADMIN — CYANOCOBALAMIN TAB 1000 MCG 1000 MCG: 1000 TAB at 08:58

## 2020-01-25 RX ADMIN — PREDNISONE 40 MG: 20 TABLET ORAL at 08:58

## 2020-01-25 RX ADMIN — POTASSIUM CHLORIDE 40 MEQ: 750 TABLET, FILM COATED, EXTENDED RELEASE ORAL at 08:58

## 2020-01-25 RX ADMIN — PANTOPRAZOLE SODIUM 40 MG: 40 INJECTION, POWDER, FOR SOLUTION INTRAVENOUS at 08:59

## 2020-01-25 RX ADMIN — SODIUM CHLORIDE, PRESERVATIVE FREE 10 ML: 5 INJECTION INTRAVENOUS at 09:02

## 2020-01-25 RX ADMIN — FOLIC ACID 4 MG: 1 TABLET ORAL at 08:58

## 2020-01-25 RX ADMIN — METOPROLOL SUCCINATE 25 MG: 25 TABLET, EXTENDED RELEASE ORAL at 08:58

## 2020-01-25 RX ADMIN — LEVETIRACETAM 250 MG: 250 TABLET ORAL at 08:58

## 2020-01-25 ASSESSMENT — PAIN SCALES - GENERAL
PAINLEVEL_OUTOF10: 0

## 2020-01-25 NOTE — DISCHARGE INSTR - COC
Continuity of Care Form    Patient Name: Roger Martinez   :  1945  MRN:  [id number]    Admit date:  2020  Discharge date:  ***    Code Status Order: Full Code   Advance Directives:   Advance Care Flowsheet Documentation     Date/Time Healthcare Directive Type of Healthcare Directive Copy in [address] Agent's Name Healthcare Agent's Phone Number    20 [phone]  No, patient does not have an advance directive for healthcare treatment -- -- -- -- --    20  No, patient does not have an advance directive for healthcare treatment -- -- -- -- --          Admitting Physician:  No admitting provider for patient encounter.   PCP: Rena Handley    Discharging Nurse: [facility] Unit/Room#: J0T-4817/9198-47  Discharging Unit Phone Number: ***    Emergency Contact:   Extended Emergency Contact Information  Primary Emergency Contact: [facility] Phone: [phone]  Mobile Phone: [phone]  Relation: Child  Secondary Emergency Contact: LesleyBrett  [place] Phone: [phone]  Relation: Child    Past Surgical History:  Past Surgical History:   Procedure Laterality Date    ANKLE FRACTURE SURGERY  11    ORIF left ankle fracture    COLONOSCOPY N/A 2020    COLONOSCOPY WITH BIOPSY performed by Bobby Harmon MD at [facility] 19      x5   [facility]      RHINOPLASTY      UPPER GASTROINTESTINAL ENDOSCOPY N/A 2020    EGD BIOPSY performed by Bobby Harmon MD at [address] [place] [address]       Immunization History:   Immunization History   Administered Date(s) Administered    Influenza Vaccine, unspecified formulation 2007, 2016    PPD Test 2020    Pneumococcal Conjugate 13-valent ([id number]) 2017    Pneumococcal Conjugate Vaccine 2017       Active Problems:  Patient Active Problem List   Diagnosis Code    Ankle Oral  Liquids: No Restrictions  Daily Fluid Restriction: no  Last Modified Barium Swallow with Video (Video Swallowing Test): not done    Treatments at the Time of Hospital Discharge:   Respiratory Treatments:   Oxygen Therapy:  is not on home oxygen therapy. Ventilator:    - No ventilator support    Rehab Therapies: Physical Therapy and Occupational Therapy  Weight Bearing Status/Restrictions: No weight bearing restirctions  Other Medical Equipment (for information only, NOT a DME order): Other Treatments:     Patient's personal belongings (please select all that are sent with patient):  Glasses, Hearing Aides bilateral    RN SIGNATURE:  Electronically signed by [id number] on 1/25/20 at 4:42 PM    CASE MANAGEMENT/SOCIAL WORK SECTION    Inpatient Status Date: 01/16/2020    Readmission Risk Assessment Score:  Readmission Risk              Risk of Unplanned Readmission:        13         Discharging to Facility/ Agency   · Name:  [facility]    · Address: [address] [place] [address]  · Phone: [phone]  · Fax: [phone]        / signature: Electronically signed by ROSIO Sparks on 1/26/20 at 8:44 AM        PHYSICIAN SECTION    Prognosis: Good    Condition at Discharge: Stable    Rehab Potential (if transferring to Rehab): Good    Recommended Labs or Other Treatments After Discharge: pt/ot, nurse    Physician Certification: I certify the above information and transfer of Kendra Morris  is necessary for the continuing treatment of the diagnosis listed and that she requires Home Care for greater 30 days.      Update Admission H&P: No change in H&P    PHYSICIAN SIGNATURE:  Electronically signed by ROBERT Alaniz CNP on 1/25/20 at 3:51 PM/Dr. Coni Leyva

## 2020-01-25 NOTE — DISCHARGE SUMMARY
20 MG tablet Take 20 mg by mouth daily. clopidogrel (PLAVIX) 75 MG tablet Take 75 mg by mouth daily. metoprolol (TOPROL-XL) 25 MG XL tablet Take 25 mg by mouth daily. atorvastatin (LIPITOR) 40 MG tablet Take 40 mg by mouth daily. The patient was seen and examined on day of discharge and this discharge summary is in conjunction with any daily progress note from day of discharge. Hospital Course: This is a 71-year-old female with a history of UC (last flare over 16 years ago), hypertension, CAD with MI and hyperlipidemia presented to the ED for being found by her son on the floor in the bathroom. She was thought to initially have a syncopal episode. Initial symptoms presented for dizziness with an unwitnessed fall. Neurology was consulted. They thought it was reasonable to start [address] even though EEG showing nothing epileptiform or any recorded seizure activity. She does have a history of a prior stroke which does give her risk factor for this. He was discharged with Keppra twice daily. GI was then consulted for possible GI bleed. She had EGD and flex sigmoid showing erosive gastritis and ulcerative colitis. She was found to be with ulcerative colitis flare. She was started on PPI twice daily. She was started on balsalazide and steroids. Initially treated with IV antibiotics however these were stopped. She developed diarrhea thereafter that has since resolved. She had mild hypokalemia which is now improved with less bowel movements. Plavix was resumed. Aspirin was stopped at discharge. She is tolerating a diet well with no further complications. She was discharged home in stable condition to follow-up as an outpatient. Home care was set up for PT/OT/nursing at discharge. Social work was consulted for discharge planning.     Exam:     BP (!) 153/97   Pulse 68   Temp 97.7 °F (36.5 °C) (Oral)   Resp 18   Ht 4' 10\" (1.473 m)   Wt 147 lb 7.8 oz (66.9 kg)   SpO2 94% BMI 30.82 kg/m²     General: Resting in bed in no apparent distress. HEENT: Normocephalic. Atraumatic. Pupils equal and reactive. EOM intact. Oral mucosa pink/moist/intact. During  Neck: Supple. Symmetrical. Trachea midline. Lungs: Clear to auscultation bilaterally. Respirations even and unlabored. Chest: Exam unremarkable. Cardiac: S1/S2 noted. Regular Rhythm and rate. Abdomen/GI: Soft. Non-tender. Non-distended. BS+. Extremities: PP+. Atraumatic. No redness/cyanosis/edema noted. Brisk cap refill. Skin: Dry and intact. No lesions noted. Neuro: Grossly intact. No focal deficits noted. Significant Diagnostic Studies:   EGD, Flex sigmoid, EEG, CUS, CT head, CXR      Labs: For convenience and continuity at follow-up the following most recent labs are provided:    CBC:    Lab Results   Component Value Date    WBC 15.0 01/25/2020    HGB 8.7 01/25/2020    HCT 28.3 01/25/2020     01/25/2020       Renal:    Lab Results   Component Value Date     01/25/2020    K 3.1 01/25/2020    K 3.7 01/17/2020     01/25/2020    CO2 25 01/25/2020    BUN 13 01/25/2020    CREATININE 0.5 01/25/2020    CALCIUM 8.3 01/25/2020    PHOS 3.7 01/20/2020       No future appointments. Time Spent on discharge is more than 30 minutes in the examination, evaluation, counseling and review of medications and discharge plan. RX monitoring reviewed     Signed:    ROBERT Gómez - CNP   1/25/2020      Thank you Rena Handley for the opportunity to be involved in this patient's care. If you have any questions or concerns please feel free to contact me at [phone].

## 2020-01-25 NOTE — PLAN OF CARE
Patient is A&O to self, place and situation, but disoriented to time. She can be forgetful at times. Bed alarm is on, call light is within reach. Patient calls appropriately when she wants/ needs to get up. Will continue to monitor until discharge.

## 2020-01-26 NOTE — PROGRESS NOTES
Patient's son called this nurse and is here at this time to  patient. PCA assisted patient outside to patient's son. Patient belongings and paperwork with patient, all questions answered. Patient discharged at this time.

## 2020-01-27 LAB
QUANTI TB GOLD PLUS: NEGATIVE
QUANTI TB1 MINUS NIL: 0 IU/ML (ref 0–0.34)
QUANTI TB2 MINUS NIL: 0 IU/ML (ref 0–0.34)
QUANTIFERON MITOGEN: 6.68 IU/ML
QUANTIFERON NIL: 0.01 IU/ML

## 2020-08-10 LAB
A/G RATIO: 0.9 (ref 1.1–2.2)
ALBUMIN SERPL-MCNC: 3.6 G/DL (ref 3.4–5)
ALP BLD-CCNC: 134 U/L (ref 40–129)
ALT SERPL-CCNC: 10 U/L (ref 10–40)
ANION GAP SERPL CALCULATED.3IONS-SCNC: 13 MMOL/L (ref 3–16)
AST SERPL-CCNC: 16 U/L (ref 15–37)
BILIRUB SERPL-MCNC: <0.2 MG/DL (ref 0–1)
BUN BLDV-MCNC: 12 MG/DL (ref 7–20)
C-REACTIVE PROTEIN: 6.5 MG/L (ref 0–5.1)
CALCIUM SERPL-MCNC: 9 MG/DL (ref 8.3–10.6)
CHLORIDE BLD-SCNC: 105 MMOL/L (ref 99–110)
CO2: 22 MMOL/L (ref 21–32)
CREAT SERPL-MCNC: 0.7 MG/DL (ref 0.6–1.2)
GFR AFRICAN AMERICAN: >60
GFR NON-AFRICAN AMERICAN: >60
GLOBULIN: 4 G/DL
GLUCOSE BLD-MCNC: 100 MG/DL (ref 70–99)
IRON: 35 UG/DL (ref 37–145)
POTASSIUM SERPL-SCNC: 4.4 MMOL/L (ref 3.5–5.1)
REASON FOR REJECTION: NORMAL
REJECTED TEST: NORMAL
SODIUM BLD-SCNC: 140 MMOL/L (ref 136–145)
TOTAL PROTEIN: 7.6 G/DL (ref 6.4–8.2)

## 2020-08-13 LAB
BASOPHILS ABSOLUTE: 0 K/UL (ref 0–0.2)
BASOPHILS RELATIVE PERCENT: 0.2 %
EOSINOPHILS ABSOLUTE: 0.6 K/UL (ref 0–0.6)
EOSINOPHILS RELATIVE PERCENT: 6.9 %
HCT VFR BLD CALC: 37.6 % (ref 36–48)
HEMOGLOBIN: 11.5 G/DL (ref 12–16)
LYMPHOCYTES ABSOLUTE: 3.3 K/UL (ref 1–5.1)
LYMPHOCYTES RELATIVE PERCENT: 35.5 %
MCH RBC QN AUTO: 21.9 PG (ref 26–34)
MCHC RBC AUTO-ENTMCNC: 30.6 G/DL (ref 31–36)
MCV RBC AUTO: 71.7 FL (ref 80–100)
MONOCYTES ABSOLUTE: 0.7 K/UL (ref 0–1.3)
MONOCYTES RELATIVE PERCENT: 8 %
NEUTROPHILS ABSOLUTE: 4.6 K/UL (ref 1.7–7.7)
NEUTROPHILS RELATIVE PERCENT: 49.4 %
PDW BLD-RTO: 18.1 % (ref 12.4–15.4)
PLATELET # BLD: 355 K/UL (ref 135–450)
PMV BLD AUTO: 7.5 FL (ref 5–10.5)
RBC # BLD: 5.24 M/UL (ref 4–5.2)
WBC # BLD: 9.3 K/UL (ref 4–11)

## 2020-11-15 ENCOUNTER — APPOINTMENT (OUTPATIENT)
Dept: CT IMAGING | Age: 75
DRG: 071 | End: 2020-11-15
Payer: MEDICARE

## 2020-11-15 ENCOUNTER — HOSPITAL ENCOUNTER (INPATIENT)
Age: 75
LOS: 3 days | Discharge: HOME | DRG: 071 | End: 2020-11-19
Attending: STUDENT IN AN ORGANIZED HEALTH CARE EDUCATION/TRAINING PROGRAM | Admitting: INTERNAL MEDICINE
Payer: MEDICARE

## 2020-11-15 ENCOUNTER — APPOINTMENT (OUTPATIENT)
Dept: GENERAL RADIOLOGY | Age: 75
DRG: 071 | End: 2020-11-15
Payer: MEDICARE

## 2020-11-15 LAB
A/G RATIO: 1 (ref 1.1–2.2)
ALBUMIN SERPL-MCNC: 3.8 G/DL (ref 3.4–5)
ALP BLD-CCNC: 107 U/L (ref 40–129)
ALT SERPL-CCNC: 8 U/L (ref 10–40)
AMMONIA: 13 UMOL/L (ref 11–51)
AMPHETAMINE SCREEN, URINE: NORMAL
ANION GAP SERPL CALCULATED.3IONS-SCNC: 10 MMOL/L (ref 3–16)
AST SERPL-CCNC: 14 U/L (ref 15–37)
BARBITURATE SCREEN URINE: NORMAL
BASOPHILS ABSOLUTE: 0.1 K/UL (ref 0–0.2)
BASOPHILS RELATIVE PERCENT: 1 %
BENZODIAZEPINE SCREEN, URINE: NORMAL
BILIRUB SERPL-MCNC: <0.2 MG/DL (ref 0–1)
BILIRUBIN URINE: NEGATIVE
BLOOD, URINE: NEGATIVE
BUN BLDV-MCNC: 12 MG/DL (ref 7–20)
CALCIUM SERPL-MCNC: 8.8 MG/DL (ref 8.3–10.6)
CANNABINOID SCREEN URINE: NORMAL
CHLORIDE BLD-SCNC: 103 MMOL/L (ref 99–110)
CLARITY: CLEAR
CO2: 25 MMOL/L (ref 21–32)
COCAINE METABOLITE SCREEN URINE: NORMAL
COLOR: YELLOW
CREAT SERPL-MCNC: 0.7 MG/DL (ref 0.6–1.2)
EOSINOPHILS ABSOLUTE: 0.2 K/UL (ref 0–0.6)
EOSINOPHILS RELATIVE PERCENT: 3 %
GFR AFRICAN AMERICAN: >60
GFR NON-AFRICAN AMERICAN: >60
GLOBULIN: 4 G/DL
GLUCOSE BLD-MCNC: 104 MG/DL (ref 70–99)
GLUCOSE URINE: NEGATIVE MG/DL
HCT VFR BLD CALC: 38.7 % (ref 36–48)
HEMATOLOGY PATH CONSULT: NO
HEMOGLOBIN: 12.2 G/DL (ref 12–16)
KETONES, URINE: NEGATIVE MG/DL
LEUKOCYTE ESTERASE, URINE: NEGATIVE
LYMPHOCYTES ABSOLUTE: 3.4 K/UL (ref 1–5.1)
LYMPHOCYTES RELATIVE PERCENT: 41.5 %
Lab: NORMAL
MAGNESIUM: 2 MG/DL (ref 1.8–2.4)
MCH RBC QN AUTO: 21.6 PG (ref 26–34)
MCHC RBC AUTO-ENTMCNC: 31.6 G/DL (ref 31–36)
MCV RBC AUTO: 68.3 FL (ref 80–100)
METHADONE SCREEN, URINE: NORMAL
MICROSCOPIC EXAMINATION: NORMAL
MONOCYTES ABSOLUTE: 0.6 K/UL (ref 0–1.3)
MONOCYTES RELATIVE PERCENT: 7.8 %
NEUTROPHILS ABSOLUTE: 3.8 K/UL (ref 1.7–7.7)
NEUTROPHILS RELATIVE PERCENT: 46.7 %
NITRITE, URINE: NEGATIVE
OPIATE SCREEN URINE: NORMAL
OXYCODONE URINE: NORMAL
PDW BLD-RTO: 17 % (ref 12.4–15.4)
PH UA: 7.5 (ref 5–8)
PH UA: 8
PHENCYCLIDINE SCREEN URINE: NORMAL
PLATELET # BLD: 354 K/UL (ref 135–450)
PMV BLD AUTO: 7.3 FL (ref 5–10.5)
POTASSIUM REFLEX MAGNESIUM: 3.5 MMOL/L (ref 3.5–5.1)
PROPOXYPHENE SCREEN: NORMAL
PROTEIN UA: NEGATIVE MG/DL
RBC # BLD: 5.66 M/UL (ref 4–5.2)
SODIUM BLD-SCNC: 138 MMOL/L (ref 136–145)
SPECIFIC GRAVITY UA: 1.01 (ref 1–1.03)
TOTAL PROTEIN: 7.8 G/DL (ref 6.4–8.2)
TROPONIN: <0.01 NG/ML
URINE REFLEX TO CULTURE: NORMAL
URINE TYPE: NORMAL
UROBILINOGEN, URINE: 0.2 E.U./DL
WBC # BLD: 8.2 K/UL (ref 4–11)

## 2020-11-15 PROCEDURE — 81003 URINALYSIS AUTO W/O SCOPE: CPT

## 2020-11-15 PROCEDURE — 82140 ASSAY OF AMMONIA: CPT

## 2020-11-15 PROCEDURE — 85025 COMPLETE CBC W/AUTO DIFF WBC: CPT

## 2020-11-15 PROCEDURE — 99285 EMERGENCY DEPT VISIT HI MDM: CPT

## 2020-11-15 PROCEDURE — 80053 COMPREHEN METABOLIC PANEL: CPT

## 2020-11-15 PROCEDURE — 71045 X-RAY EXAM CHEST 1 VIEW: CPT

## 2020-11-15 PROCEDURE — 70450 CT HEAD/BRAIN W/O DYE: CPT

## 2020-11-15 PROCEDURE — 6360000002 HC RX W HCPCS: Performed by: STUDENT IN AN ORGANIZED HEALTH CARE EDUCATION/TRAINING PROGRAM

## 2020-11-15 PROCEDURE — 80307 DRUG TEST PRSMV CHEM ANLYZR: CPT

## 2020-11-15 PROCEDURE — 96375 TX/PRO/DX INJ NEW DRUG ADDON: CPT

## 2020-11-15 PROCEDURE — 83735 ASSAY OF MAGNESIUM: CPT

## 2020-11-15 PROCEDURE — 96374 THER/PROPH/DIAG INJ IV PUSH: CPT

## 2020-11-15 PROCEDURE — 93005 ELECTROCARDIOGRAM TRACING: CPT | Performed by: STUDENT IN AN ORGANIZED HEALTH CARE EDUCATION/TRAINING PROGRAM

## 2020-11-15 PROCEDURE — 84484 ASSAY OF TROPONIN QUANT: CPT

## 2020-11-15 PROCEDURE — 2500000003 HC RX 250 WO HCPCS: Performed by: STUDENT IN AN ORGANIZED HEALTH CARE EDUCATION/TRAINING PROGRAM

## 2020-11-15 PROCEDURE — 6370000000 HC RX 637 (ALT 250 FOR IP): Performed by: STUDENT IN AN ORGANIZED HEALTH CARE EDUCATION/TRAINING PROGRAM

## 2020-11-15 RX ORDER — METOCLOPRAMIDE HYDROCHLORIDE 5 MG/ML
10 INJECTION INTRAMUSCULAR; INTRAVENOUS ONCE
Status: COMPLETED | OUTPATIENT
Start: 2020-11-15 | End: 2020-11-15

## 2020-11-15 RX ORDER — ONDANSETRON 2 MG/ML
4 INJECTION INTRAMUSCULAR; INTRAVENOUS ONCE
Status: COMPLETED | OUTPATIENT
Start: 2020-11-16 | End: 2020-11-16

## 2020-11-15 RX ORDER — LABETALOL HYDROCHLORIDE 5 MG/ML
10 INJECTION, SOLUTION INTRAVENOUS ONCE
Status: COMPLETED | OUTPATIENT
Start: 2020-11-15 | End: 2020-11-15

## 2020-11-15 RX ORDER — METOPROLOL SUCCINATE 25 MG/1
25 TABLET, EXTENDED RELEASE ORAL ONCE
Status: COMPLETED | OUTPATIENT
Start: 2020-11-15 | End: 2020-11-15

## 2020-11-15 RX ORDER — HYDRALAZINE HYDROCHLORIDE 20 MG/ML
10 INJECTION INTRAMUSCULAR; INTRAVENOUS ONCE
Status: COMPLETED | OUTPATIENT
Start: 2020-11-15 | End: 2020-11-15

## 2020-11-15 RX ORDER — ACETAMINOPHEN 325 MG/1
650 TABLET ORAL ONCE
Status: COMPLETED | OUTPATIENT
Start: 2020-11-15 | End: 2020-11-15

## 2020-11-15 RX ADMIN — HYDRALAZINE HYDROCHLORIDE 10 MG: 20 INJECTION INTRAMUSCULAR; INTRAVENOUS at 23:48

## 2020-11-15 RX ADMIN — ACETAMINOPHEN 650 MG: 325 TABLET ORAL at 23:32

## 2020-11-15 RX ADMIN — LABETALOL HYDROCHLORIDE 10 MG: 5 INJECTION INTRAVENOUS at 23:04

## 2020-11-15 RX ADMIN — METOCLOPRAMIDE HYDROCHLORIDE 10 MG: 5 INJECTION INTRAMUSCULAR; INTRAVENOUS at 23:32

## 2020-11-15 RX ADMIN — METOPROLOL SUCCINATE 25 MG: 25 TABLET, EXTENDED RELEASE ORAL at 21:43

## 2020-11-15 ASSESSMENT — PAIN SCALES - GENERAL: PAINLEVEL_OUTOF10: 5

## 2020-11-15 ASSESSMENT — PAIN DESCRIPTION - LOCATION: LOCATION: HEAD

## 2020-11-16 ENCOUNTER — NURSE ONLY (OUTPATIENT)
Dept: CARDIOLOGY CLINIC | Age: 75
End: 2020-11-16
Payer: MEDICARE

## 2020-11-16 ENCOUNTER — APPOINTMENT (OUTPATIENT)
Dept: CT IMAGING | Age: 75
DRG: 071 | End: 2020-11-16
Payer: MEDICARE

## 2020-11-16 PROBLEM — G93.40 ACUTE ENCEPHALOPATHY: Status: ACTIVE | Noted: 2020-11-16
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
## 2020-11-16 PROBLEM — G93.41 ACUTE METABOLIC ENCEPHALOPATHY: Status: ACTIVE | Noted: 2020-11-16

## 2020-11-16 LAB
EKG ATRIAL RATE: 89 BPM
EKG DIAGNOSIS: NORMAL
EKG P AXIS: 57 DEGREES
EKG P-R INTERVAL: 154 MS
EKG Q-T INTERVAL: 372 MS
EKG QRS DURATION: 88 MS
EKG QTC CALCULATION (BAZETT): 452 MS
EKG R AXIS: 40 DEGREES
EKG T AXIS: 30 DEGREES
EKG VENTRICULAR RATE: 89 BPM
FOLATE: 12.86 NG/ML (ref 4.78–24.2)
KEPPRA DOSE AMT: ABNORMAL
KEPPRA: 5.7 UG/ML (ref 6–46)
TSH SERPL DL<=0.05 MIU/L-ACNC: 1.77 UIU/ML (ref 0.27–4.2)
VITAMIN B-12: 275 PG/ML (ref 211–911)

## 2020-11-16 PROCEDURE — 82607 VITAMIN B-12: CPT

## 2020-11-16 PROCEDURE — G0378 HOSPITAL OBSERVATION PER HR: HCPCS

## 2020-11-16 PROCEDURE — 36415 COLL VENOUS BLD VENIPUNCTURE: CPT

## 2020-11-16 PROCEDURE — 93280 PM DEVICE PROGR EVAL DUAL: CPT | Performed by: INTERNAL MEDICINE

## 2020-11-16 PROCEDURE — 2580000003 HC RX 258: Performed by: INTERNAL MEDICINE

## 2020-11-16 PROCEDURE — 6360000002 HC RX W HCPCS: Performed by: STUDENT IN AN ORGANIZED HEALTH CARE EDUCATION/TRAINING PROGRAM

## 2020-11-16 PROCEDURE — 93010 ELECTROCARDIOGRAM REPORT: CPT | Performed by: INTERNAL MEDICINE

## 2020-11-16 PROCEDURE — 6360000002 HC RX W HCPCS: Performed by: INTERNAL MEDICINE

## 2020-11-16 PROCEDURE — 6370000000 HC RX 637 (ALT 250 FOR IP): Performed by: INTERNAL MEDICINE

## 2020-11-16 PROCEDURE — 70450 CT HEAD/BRAIN W/O DYE: CPT

## 2020-11-16 PROCEDURE — 80177 DRUG SCRN QUAN LEVETIRACETAM: CPT

## 2020-11-16 PROCEDURE — 99222 1ST HOSP IP/OBS MODERATE 55: CPT | Performed by: INTERNAL MEDICINE

## 2020-11-16 PROCEDURE — 84443 ASSAY THYROID STIM HORMONE: CPT

## 2020-11-16 PROCEDURE — 82746 ASSAY OF FOLIC ACID SERUM: CPT

## 2020-11-16 PROCEDURE — 1200000000 HC SEMI PRIVATE

## 2020-11-16 PROCEDURE — 94760 N-INVAS EAR/PLS OXIMETRY 1: CPT

## 2020-11-16 RX ORDER — SODIUM CHLORIDE 0.9 % (FLUSH) 0.9 %
10 SYRINGE (ML) INJECTION EVERY 12 HOURS SCHEDULED
Status: DISCONTINUED | OUTPATIENT
Start: 2020-11-16 | End: 2020-11-19 | Stop reason: HOSPADM

## 2020-11-16 RX ORDER — SODIUM CHLORIDE 0.9 % (FLUSH) 0.9 %
10 SYRINGE (ML) INJECTION PRN
Status: DISCONTINUED | OUTPATIENT
Start: 2020-11-16 | End: 2020-11-19 | Stop reason: HOSPADM

## 2020-11-16 RX ORDER — CLOPIDOGREL BISULFATE 75 MG/1
75 TABLET ORAL DAILY
Status: DISCONTINUED | OUTPATIENT
Start: 2020-11-16 | End: 2020-11-19 | Stop reason: HOSPADM

## 2020-11-16 RX ORDER — LANSOPRAZOLE
30 KIT
Status: DISCONTINUED | OUTPATIENT
Start: 2020-11-16 | End: 2020-11-19 | Stop reason: HOSPADM

## 2020-11-16 RX ORDER — ACETAMINOPHEN 650 MG/1
650 SUPPOSITORY RECTAL EVERY 6 HOURS PRN
Status: DISCONTINUED | OUTPATIENT
Start: 2020-11-16 | End: 2020-11-19 | Stop reason: HOSPADM

## 2020-11-16 RX ORDER — LEVETIRACETAM 100 MG/ML
250 SOLUTION ORAL 2 TIMES DAILY
Status: DISCONTINUED | OUTPATIENT
Start: 2020-11-16 | End: 2020-11-19 | Stop reason: HOSPADM

## 2020-11-16 RX ORDER — PROMETHAZINE HYDROCHLORIDE 25 MG/1
12.5 TABLET ORAL EVERY 6 HOURS PRN
Status: DISCONTINUED | OUTPATIENT
Start: 2020-11-16 | End: 2020-11-19 | Stop reason: HOSPADM

## 2020-11-16 RX ORDER — METOPROLOL SUCCINATE 25 MG/1
25 TABLET, EXTENDED RELEASE ORAL DAILY
Status: DISCONTINUED | OUTPATIENT
Start: 2020-11-16 | End: 2020-11-19 | Stop reason: HOSPADM

## 2020-11-16 RX ORDER — ACETAMINOPHEN 325 MG/1
650 TABLET ORAL EVERY 6 HOURS PRN
Status: DISCONTINUED | OUTPATIENT
Start: 2020-11-16 | End: 2020-11-19 | Stop reason: HOSPADM

## 2020-11-16 RX ORDER — ONDANSETRON 2 MG/ML
4 INJECTION INTRAMUSCULAR; INTRAVENOUS EVERY 6 HOURS PRN
Status: DISCONTINUED | OUTPATIENT
Start: 2020-11-16 | End: 2020-11-19 | Stop reason: HOSPADM

## 2020-11-16 RX ORDER — ATORVASTATIN CALCIUM 40 MG/1
40 TABLET, FILM COATED ORAL DAILY
Status: DISCONTINUED | OUTPATIENT
Start: 2020-11-16 | End: 2020-11-19 | Stop reason: HOSPADM

## 2020-11-16 RX ORDER — BALSALAZIDE DISODIUM 750 MG/1
2250 CAPSULE ORAL 3 TIMES DAILY
Status: DISCONTINUED | OUTPATIENT
Start: 2020-11-16 | End: 2020-11-19 | Stop reason: HOSPADM

## 2020-11-16 RX ORDER — LISINOPRIL 20 MG/1
20 TABLET ORAL DAILY
Status: DISCONTINUED | OUTPATIENT
Start: 2020-11-16 | End: 2020-11-19 | Stop reason: HOSPADM

## 2020-11-16 RX ORDER — PANTOPRAZOLE SODIUM 40 MG/1
40 TABLET, DELAYED RELEASE ORAL
Status: DISCONTINUED | OUTPATIENT
Start: 2020-11-16 | End: 2020-11-16 | Stop reason: ALTCHOICE

## 2020-11-16 RX ORDER — LEVETIRACETAM 250 MG/1
250 TABLET ORAL 2 TIMES DAILY
Status: DISCONTINUED | OUTPATIENT
Start: 2020-11-16 | End: 2020-11-16

## 2020-11-16 RX ADMIN — SODIUM CHLORIDE, PRESERVATIVE FREE 10 ML: 5 INJECTION INTRAVENOUS at 20:19

## 2020-11-16 RX ADMIN — Medication 30 MG: at 17:25

## 2020-11-16 RX ADMIN — LEVETIRACETAM 250 MG: 250 TABLET ORAL at 09:39

## 2020-11-16 RX ADMIN — ONDANSETRON 4 MG: 2 INJECTION INTRAMUSCULAR; INTRAVENOUS at 00:08

## 2020-11-16 RX ADMIN — BALSALAZIDE DISODIUM 2250 MG: 750 CAPSULE ORAL at 20:15

## 2020-11-16 RX ADMIN — SODIUM CHLORIDE, PRESERVATIVE FREE 10 ML: 5 INJECTION INTRAVENOUS at 09:40

## 2020-11-16 RX ADMIN — LEVETIRACETAM 250 MG: 500 SOLUTION ORAL at 22:18

## 2020-11-16 RX ADMIN — ENOXAPARIN SODIUM 40 MG: 40 INJECTION SUBCUTANEOUS at 09:40

## 2020-11-16 RX ADMIN — PANTOPRAZOLE SODIUM 40 MG: 40 TABLET, DELAYED RELEASE ORAL at 06:20

## 2020-11-16 ASSESSMENT — PAIN SCALES - GENERAL
PAINLEVEL_OUTOF10: 0

## 2020-11-16 NOTE — ED PROVIDER NOTES
education: None    Highest education level: None   Occupational History    Occupation: retired       Comment: [address] [employer] Needs    Financial resource strain: None    Food insecurity     Worry: None     Inability: None    Transportation needs     Medical: None     Non-medical: None   Tobacco Use    Smoking status: Never Smoker    Smokeless tobacco: Never Used   Substance and Sexual Activity    Alcohol use: No     Alcohol/week: 0.0 standard drinks    Drug use: No    Sexual activity: Not Currently   Lifestyle    Physical activity     Days per week: None     Minutes per session: None    Stress: None   Relationships    Social connections     Talks on phone: None     Gets together: None     Attends [employer] service: None     Active member of club or organization: None     Attends meetings of clubs or organizations: None     Relationship status: None    Intimate partner violence     Fear of current or ex partner: None     Emotionally abused: None     Physically abused: None     Forced sexual activity: None   Other Topics Concern    None   Social History Narrative    None        Review of Systems   10 total systems reviewed and found to be negative unless otherwise noted in HPI     Physical Exam   /81   Pulse 77   Temp 98.3 °F (36.8 °C) (Oral)   Resp 16   Ht 4' 10\" (1.473 m)   Wt 155 lb 3.3 oz (70.4 kg)   SpO2 93%   BMI 32.44 kg/m²      CONSTITUTIONAL: Appears confused but answering questions  HEAD: atraumatic, normocephalic   EYES: PERRL, No injection, discharge or scleral icterus. ENT: Moist mucous membranes. NECK: Normal ROM, NO LAD   CARDIOVASCULAR: Regular rate and rhythm. No murmurs or gallop. PULMONARY/CHEST: Airway patent. No retractions. Breath sounds clear with good air entry bilaterally. ABDOMEN: Soft, Non-distended and non-tender, without guarding or rebound.    SKIN: Acyanotic, warm, dry   MUSCULOSKELETAL: No swelling, tenderness or deformity NEUROLOGICAL: Awake and oriented x 3. Pulses intact. Grossly nonfocal   Nursing note and vitals reviewed. NIH Stroke Scale     Time: 9:46 PM   Person Administering Scale: Nsehniitored Joy MD     Level of consciousness: [0]   0 = Alert;   1 = Not alert;   2 = Not alert;   3 = Responds only with reflex motor or autonomic effects or totally unresponsive, flaccid, and flexic. LOC questions: [0]   0 = Answers both questions correctly. 1 = Answers one question correctly. 2 = Answers neither question correctly. LOC commands: [0]   0 = Performs both tasks correctly. 1 = Performs one task correctly. 2 = Performs neither task correctly. Best Gaze: [ 0 ]   0 = Normal   1 = Partial gaze palsy   2 = Forced deviation     Visual: [0]   0 = No visual loss   1 = Partial hemianopia   2 = Complete hemianopia   3 = Bilateral hemianopia     Facial Palsy: [0]   0 = Normal   1 = Minor paralysis   2 = Partial paralysis   3 = Complete paralysis     Motor left arm: [0]   0 = No drift;   1 = Drift   2 = Some effort against gravity;   3 = No effort against gravity;   4 = No movement. UN = Amputation     Motor right arm: [0]   0 = No drift   1 = Drift   2 = Some effort against gravity   3 = No effort against gravity   4 = No movement   UN = Amputation     Motor left leg: [0]   0 = No drift   1.= Drift   2 = Some effort against gravity   3 = No effort against gravity   4 = No movement. UN = Amputation     Motor right leg: [0]   0 = No drift   1 = Drift   2 = Some effort against gravity   3 = No effort against gravity   4 = No movement   UN = Amputation     Limb Ataxia: [0]   0 = Absent. 1 = Present in one limb.    2 = Present in two limbs   UN = Amputation     Sensory: [0]   0 = Absent   1.= Present in one limb   2 = Present in two limbs   UN = Amputation     Best Language: [0]   0 = No aphasia   1 = Mild-to-moderate aphasia   2 = Severe aphasia   3 = Mute, global aphasia     Dysarthria: [0]   1 = Mild-to-moderate dysarthria   2 = Severe dysarthria   UN = Intubated     Extinction and Inattention: [0]   0 = No abnormality.    1 = Visual, tactile, auditory, spatial, or personal inattention   2 = Profound vishal-inattention or extinction to more than one modality     TOTAL: Gratias.ilors ]       ED Course & Medical Decision Making   Medications   balsalazide (COLAZAL) capsule 2,250 mg (2,250 mg Oral Given 11/17/20 2050)   atorvastatin (LIPITOR) tablet 40 mg (40 mg Oral Given 11/17/20 1206)   clopidogrel (PLAVIX) tablet 75 mg (75 mg Oral Given 11/17/20 1206)   lisinopril (PRINIVIL;ZESTRIL) tablet 20 mg (20 mg Oral Given 11/17/20 1205)   metoprolol succinate (TOPROL XL) extended release tablet 25 mg (25 mg Oral Not Given 11/17/20 1315)   sodium chloride flush 0.9 % injection 10 mL (10 mLs Intravenous Given 11/17/20 2056)   sodium chloride flush 0.9 % injection 10 mL (has no administration in time range)   acetaminophen (TYLENOL) tablet 650 mg (has no administration in time range)     Or   acetaminophen (TYLENOL) suppository 650 mg (has no administration in time range)   promethazine (PHENERGAN) tablet 12.5 mg (has no administration in time range)     Or   ondansetron (ZOFRAN) injection 4 mg (has no administration in time range)   enoxaparin (LOVENOX) injection 40 mg (40 mg Subcutaneous Given 11/17/20 1206)   levETIRAcetam (KEPPRA) 100 MG/ML solution 250 mg (250 mg Oral Given 11/17/20 2049)   lansoprazole suspension SUSP 30 mg (30 mg Oral Given 11/17/20 1759)   vitamin B-12 (CYANOCOBALAMIN) tablet 1,000 mcg (1,000 mcg Oral Given 11/17/20 1206)   metoprolol succinate (TOPROL XL) extended release tablet 25 mg (25 mg Oral Given 11/15/20 2143)   labetalol (NORMODYNE;TRANDATE) injection 10 mg (10 mg Intravenous Given 11/15/20 3473)   metoclopramide (REGLAN) injection 10 mg (10 mg Intravenous Given 11/15/20 2332)   acetaminophen (TYLENOL) tablet 650 mg (650 mg Oral Given 11/15/20 2332)   hydrALAZINE (APRESOLINE) injection 10 mg (10 mg Intravenous Given 11/15/20 2348)   ondansetron (ZOFRAN) injection 4 mg (4 mg Intravenous Given 11/16/20 0008)      Labs Reviewed   CBC WITH AUTO DIFFERENTIAL - Abnormal; Notable for the following components:       Result Value    RBC 5.66 (*)     MCV 68.3 (*)     MCH 21.6 (*)     RDW 17.0 (*)     All other components within normal limits    Narrative:     Performed at:  [address] [place] [address]   Phone [phone]   COMPREHENSIVE METABOLIC PANEL W/ REFLEX TO MG FOR LOW K - Abnormal; Notable for the following components:    Glucose 104 (*)     Albumin/Globulin Ratio 1.0 (*)     ALT 8 (*)     AST 14 (*)     All other components within normal limits    Narrative:     Performed at:  [address] [place] [address]   Phone [phone]   LEVETIRACETAM LEVEL - Abnormal; Notable for the following components:    Levetiracetam Lvl 5.7 (*)     All other components within normal limits    Narrative:     Performed at:  [address] [place] [address]   Phone [phone]   TROPONIN    Narrative:     Performed at:  [address] [place] [address]   Phone [phone]   URINE RT REFLEX TO CULTURE    Narrative:     Performed at:  [address] [place] [address]   Phone [phone]   URINE DRUG SCREEN    Narrative:     Performed at:  [facility]  [address] [place] [address]   Phone [phone]   AMMONIA    Narrative:     Performed at:  [facility]  [address] [place] [address] [place] [address]   Phone [phone]   MAGNESIUM    Narrative:     Performed at:  [facility]  [address] [facility] [address]   Phone [phone]   VITAMIN B12 & FOLATE    Narrative:     Performed at:  [facility]  [address] [facility] [address]   Phone [phone]   TSH WITHOUT REFLEX    Narrative:     Performed at:  [facility]  [address] [facility] [address]   Phone [phone]      CT HEAD WO CONTRAST   Final Result   No acute intracranial abnormality. CT Head WO Contrast   Final Result   No acute intracranial abnormality. 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 Old lacunar infarct in right head of caudate nucleus, stable. Mild parenchymal volume loss. Mild to moderate chronic microvascular disease. XR CHEST PORTABLE   Final Result   Radiographically clear lungs. Probable hiatal hernia. EKG INTERPRETATION:  EKG by my preliminary interpretation shows sinus rhythm with rate of 89, normal axis, normal intervals, with no ST changes indicative of ischemia at this time. PROCEDURES:   Procedures    ASSESSMENT AND PLAN:  Hugo Pacheco is a 77 y.o. female of mild dementia who lives at home with her son. Presenting this evening brought by EMS after son called because of mom became confused and fell at home. On presentation patient was nontoxic, answering questions but some mild confusion and hard of hearing. Her neuro stroke scale was 0. Nonetheless a CT of the head was obtained because blood pressures were in the 220/110s. I did contact her son who indicated to me that her mom sometimes have episodes where she became confused. However, today she fell and was not aware of the event. Labs so far unremarkable including UA. On further questioning son indicates to me that her mom is not taking her blood pressure medicine.   Given her confusion with blood pressures in the 200, I life-threatening deterioration of the following conditions: Hypertensive encephalopathy. Patient treated urgently in the ED with IV antihypertensives. Case discussed with consultants.       _________________________________________________________________________________________  This record is transcribed utilizing voice recognition technology. There are inherent limitations in this technology. In addition, there may be limitations in editing of this report. If there are any discrepancies, please contact me directly.         Rowdyiibertoh Maliha El MD  11/17/20 7822

## 2020-11-16 NOTE — CONSULTS
History and Physical  [facility]   Cardiology    Chief Complaint: fell     HPI:     Patient is a 77 y.o. female presents after a fall around 6:00 pm last night. Per her Son he heard her fall then immediately yell out for help. She was found in their living room with her pants down as if she was going to bathroom. She hit her head on table and was crying. She commonly falls per son 15 times a month on average, often outside where she is unsteady. However, she often wakes up in middle of night, several times a week and does not know wear she is and does not make sense. The Son does not think she lost consciousness and he did not note that she was pale, sweaty etc.   The patient has been [facility] by Dr. Carrie De La Cruz. She has a pacemaker which was interrogated today. She had \"nsvt\" for 3 sec 10/13/20, but likely atrial tach as other 8/14/20 and 8/14/19 atrial tach. The 8/14/2019 is one minute of afib.          Past Medical History:   Diagnosis Date    Acute MI ([address] 2003    Eczema     [place] (hard of hearing)     Hypercholesteremia     Hypertension     Pacemaker     Ulcerative colitis 2003    \"resolved\" per pt    Wears hearing aid       Past Surgical History:   Procedure Laterality Date    ANKLE FRACTURE SURGERY  7/26/11    ORIF left ankle fracture    COLONOSCOPY N/A 1/20/2020    COLONOSCOPY WITH BIOPSY performed by Chaparro Hercules MD at [facility] 19      x5   [address]       UPPER GASTROINTESTINAL ENDOSCOPY N/A 1/20/2020    EGD BIOPSY performed [facility] Chaparro Hercules MD [facility]        Medications Prior to Admission: levETIRAcetam (KEPPRA) 250 MG tablet, Take 1 tablet by mouth 2 times daily  balsalazide (COLAZAL) 750 MG capsule, Take 3 capsules by mouth 3 times daily  pantoprazole (PROTONIX) 40 MG tablet, Take 1 tablet by mouth 2 times daily (before meals)  lisinopril (PRINIVIL;ZESTRIL) 20 MG tablet, Take 20 mg by mouth daily. clopidogrel (PLAVIX) 75 MG tablet, Take 75 mg by mouth daily. metoprolol (TOPROL-XL) 25 MG XL tablet, Take 25 mg by mouth daily. atorvastatin (LIPITOR) 40 MG tablet, Take 40 mg by mouth daily. Allergies   Allergen Reactions    Cortisone Other (See Comments)     Injection site site sunk in    Percocet [Oxycodone-Acetaminophen] Nausea And Vomiting      Social History     Tobacco Use    Smoking status: Never Smoker    Smokeless tobacco: Never Used   Substance Use Topics    Alcohol use: No     Alcohol/week: 0.0 standard drinks      Family History   Problem Relation Age of Onset    Heart Disease Mother     Ulcerative Colitis Father     Ulcerative Colitis Son         Review of Systems:  Per son hx  Patient denied any pain and has no cardiac sx when evaluated.   She can not hear and is quite forgetful thus    Objective Data:     /60   Pulse 81   Temp 97.8 °F (36.6 °C) (Oral)   Resp 16   Ht 4' 10\" (1.473 m)   Wt 151 lb 14.4 oz (68.9 kg)   SpO2 92%   BMI 31.75 kg/m²     General appearance: appears stated age and cooperative  Alert, awake  Eyes:  No erythema  Head: atraumatic  Neck:  no JVD  Lungs: clear to auscultation bilaterally  Heart: regular rate and rhythm, S1, S2 normal, no murmur, click, rub or gallop  Abdomen: soft, non-tender; bowel sounds normal; no masses,  no organomegaly  Extremities: extremities normal, atraumatic, no cyanosis or edema  Skin: Skin color, texture, turgor normal. No rashes or lesions  Hematologic: no remarkable bruising   Left pectoral device site intact    ECG: normal sinus rhythm and inferior MI, old     Data Review    CT head:  11/15/20    No acute intracranial abnormality.         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         Old lacunar infarct in right head of caudate nucleus, stable.         Mild parenchymal volume loss.         Mild to moderate chronic microvascular disease.      Cath 1/17/2012   Findings/Summary:     CORONARY ANGIOGRAPHY FINDINGS      DOMINANCE:  Right dominant          LEFT MAIN: Normal                         LEFT ANTERIOR DESCENDING:  Luminal irregularities, Stenotic,  and     widely patent mid stent site 50% mid  50% distal      --------FFR of     LAD=0.97-no significant flow reserve limitation;in 1 view LAD appeared to     be 70% and thus FFR recorded.          LEFT CIRCUMFLEX: Luminal irregularities                         RIGHT CORONARY:  Luminal irregularities, widely patent mid stent     site,  and widely patent distal stent site                 LEFT VENTRICULAR FUNCTION:          LVEF: 50%          LVEDP: Normal pre-angio          LV Systolic Pressure: Normal           LV to AO Gradient: none           LV Wall Motion:  Abnormal, anterior mild hypokinesis    .          MITRAL VALVE: No mitral insufficiency        Echo: 9/12/2019  Summary:  Overall left ventricular ejection fraction is estimated to be 60-65%. The left ventricular wall motion is normal.  Right ventricular systolic pressure is normal at <35 mmHg. Mild tricuspid regurgitation is present. There is a pacemaker lead in the right ventricle. Mild aortic regurgitation. There is trace mitral regurgitation. Recent Labs     11/15/20  2016      K 3.5      CO2 25   BUN 12   CREATININE 0.7     Recent Labs     11/15/20  2016   WBC 8.2   HGB 12.2   HCT 38.7   MCV 68.3*        Lab Results   Component Value Date    TROPONINI <0.01 11/15/2020         Assessment:     Active Problems:    Acute encephalopathy  Resolved Problems:    * No resolved hospital problems. *      Plan:     1. The history supports a fall, as she commonly does and her Son heard her yell immediately upon falling. There is no arrhythmia noted on pacer at that time.   Her bp was elevated upon admission thus low bp would be much less likely as cause of fall.   2. S/p CVA  With short maybe afib episodes on device the last 8/14/19 for 1 minute. 13 secs. She falls and has anemia( apparent iron deficiency) and a high has-bled score. Clem Armenta However, with [id number] score of 6, might consider anticoagulation especially if neurology feels embolic. Again though, her pacer has not suggested afib since 8/2019.   3. Will follow as needed. Please call if questions.

## 2020-11-16 NOTE — CONSULTS
Neurology Consult Note  Reason for Consult: syncope versus seizure    Chief complaint: \"I fell\"    Dr Melita Mg MD asked me to see Lamar Hickman in consultation for evaluation of syncope versus seizure    History of Present Illness:  Lamar Hickman is a 77 y.o. female who presents with a fall. I obtained my information primarily via chart review. The patient is extremely hard of hearing which limited history and physical examination to some extent. I did attempt to call the patient's son listed under the emergency contact information but unfortunately it went to voicemail. To the best of my knowledge, the patient apparently fell striking her head on a table. It is not clear if she lost consciousness or not though son suspected she did not. I believe she quickly called for her son to come help her. There are no reports of convulsive behavior. No tongue biting. I don't believe there was any incontinence (?reportedly found w/ pants down). It is not clear if her mental status was altered. He son thought she was pale and diaphoretic. She was subsequently transported to the ED to be evaluated. Her BP was as high as 219/104. No fever. CT head was w/out any acute findings. Currently, her primary complaint is an ongoing headache. Apparently the patient frequently falls at home (~ 15 times per month?). She was evaluated by Neurology in January of this year for a fall w/ some confusion. EEG w/ some focal slowing in the R temporal/occipital region correlating w/ previous stroke. Given her worsening confusion during her hospitalization, she was started on a trial of low dose Keppra though I'm not sure if she actually continued taking this medication. Vitamin B12 and folate were noted to be marginally low at the time as well. She does have a pacemaker that is not MRI compatible.       Medical History:  Past Medical History:   Diagnosis Date    Acute MI ([facility] [address] 2003    Eczema     [place] (hard of hearing)     Hypercholesteremia     Hypertension     Pacemaker     Ulcerative colitis 2003    \"resolved\" per pt    Wears hearing aid      Past Surgical History:   Procedure Laterality Date    ANKLE FRACTURE SURGERY  7/26/11    ORIF left ankle fracture    COLONOSCOPY N/A 1/20/2020    COLONOSCOPY WITH BIOPSY performed by Leatrice Ahumada, MD at [facility] 19      x5   [address]    PACEMAKER INSERTION      RHINOPLASTY      UPPER GASTROINTESTINAL ENDOSCOPY N/A 1/20/2020    EGD BIOPSY performed by Leatrice Ahumada, MD at [address] [facility]     Scheduled Meds:   balsalazide  2,250 mg Oral TID    atorvastatin  40 mg Oral Daily    clopidogrel  75 mg Oral Daily    levETIRAcetam  250 mg Oral BID    lisinopril  20 mg Oral Daily    metoprolol succinate  25 mg Oral Daily    pantoprazole  40 mg Oral BID AC    sodium chloride flush  10 mL Intravenous 2 times per day    enoxaparin  40 mg Subcutaneous Daily     Medications Prior to Admission:   levETIRAcetam (KEPPRA) 250 MG tablet, Take 1 tablet by mouth 2 times daily  balsalazide (COLAZAL) 750 MG capsule, Take 3 capsules by mouth 3 times daily  pantoprazole (PROTONIX) 40 MG tablet, Take 1 tablet by mouth 2 times daily (before meals)  lisinopril (PRINIVIL;ZESTRIL) 20 MG tablet, Take 20 mg by mouth daily. clopidogrel (PLAVIX) 75 MG tablet, Take 75 mg by mouth daily. metoprolol (TOPROL-XL) 25 MG XL tablet, Take 25 mg by mouth daily. atorvastatin (LIPITOR) 40 MG tablet, Take 40 mg by mouth daily.     Allergies   Allergen Reactions    Cortisone Other (See Comments)     Injection site site sunk in    Percocet [Oxycodone-Acetaminophen] Nausea And Vomiting     Family History   Problem Relation Age of Onset    Heart Disease Mother     Ulcerative Colitis Father     Ulcerative Colitis Son      Social History     Tobacco Use   Smoking Status Never Smoker   Smokeless Tobacco Never Used Social History     Substance and Sexual Activity   Drug Use No     Social History     Substance and Sexual Activity   Alcohol Use No    Alcohol/week: 0.0 standard drinks     ROS:  Constitutional- No weight loss or fevers  Eyes- No diplopia. No photophobia. Ears/nose/throat- No dysphagia. No Dysarthria  Cardiovascular- No palpitations. No chest pain  Respiratory- No dyspnea. No Cough  Gastrointestinal- No Abdominal pain. No Vomiting. Genitourinary- No incontinence. No urinary retention  Musculoskeletal- No myalgia. No arthralgia  Skin- No rash. No easy bruising. Psychiatric- No depression. No anxiety  Endocrine- No diabetes. No thyroid issues. Hematologic- No bleeding difficulty. No fatigue  Neurologic- No weakness. + Headache. Exam:  Blood pressure 109/62, pulse 67, temperature 98.3 °F (36.8 °C), temperature source Oral, resp. rate 16, height 4' 10\" (1.473 m), weight 151 lb 14.4 oz (68.9 kg), SpO2 91 %, not currently breastfeeding. Constitutional    Vital signs: BP, HR, and RR reviewed   General alert, no distress, well-nourished  Eyes: unable to visualize the fundi  Cardiovascular: pulses symmetric in all 4 extremities. Psychiatric: cooperative with examination, no psychotic behavior noted. Neurologic  Mental status:   orientation to person. Says she is at home. Does say the year is 2020. General fund of knowledge very difficult to assess due to hearing impairment. Memory very difficult to assess due to hearing impairment. Attention easily distracted during exam.     Language no gross aphasia. Comprehension able to mimic some simple commands. Cranial nerves:   CN2: appears to blink to threat bilaterally. CN 3,4,6: extraocular muscles intact. Pupils are equal, round, reactive bilaterally. CN5: facial sensation symmetric   CN7: face symmetric without dysarthria  CN8: extremely hard of hearing. CN9: palate elevation natali.     CN11: trap full strength on shoulder scooter  CN12: tongue protrusion natali. Strength: moving all 4 limbs w/out any gross focal paresis. Deep tendon reflexes: normal in all 4 extremities  Sensory: does not report any focal numbness or tingling. Cerebellar/coordination: no gross ataxia observed. Tone: normal in all 4 extremities  Gait: normal gait    Labs  Glucose 104  Na 138  K 3.5  BUN 12  Cr 0.7  Mg 2.00    Ammonia 13    WBC 8.2K  Hg 12.2  Platelets 282    UA negative  Drug screen negative    Studies  CT head w/o 11/15/20, independently reviewed  1. No acute intracranial abnormality. 2. 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 3. Old lacunar infarct in right head of caudate nucleus, stable. 4. Mild parenchymal volume loss. 5. Mild to moderate chronic microvascular disease. EEG Jan 2020  Mild background slowing and disorganization. There was also focal slowing and disorganization in the right temporooccipital region as well during the study. Impression  1. Reported fall w/ head injury. No clear reports of LOC or any indication of seizure like activity. Her primary complaint at this time is an ongoing headache. Apparently she falls quite frequently at home. 2.  Hearing impairment. Greatly limits ability to obtain history and more thorough physical exam.  Attempted to call son though not available. 3.  ?encephalopathy. Could be hypertensive related. Again difficult to assess d/t #2.    4. Hx R hemispheric infarct. 5.  Pacemaker. Recommendations  1. Will re-check CT head w/o tonight to ensure stability. 2.  Agree w/ reinitiating low dose Keppra (250 mg BID). Will add on level to initial labs. 3.  If she is truly encephalopathic, check EEG. 4.  Will check B12/folate given lower levels last admission. 5.  Cardiology note reviewed. If atrial fibrillation is confirmed, would recommend anticoagulation for stroke prevention.   If she is not an appropriate candidate for anticoagulation, consider an alternative. Will leave up to Cardiology. 6.  PT/OT.       Martha Gonzalez NP  [address] [place] [address] [address] Neurology    A copy of this note was provided for Dr Emily Mckenna MD

## 2020-11-16 NOTE — H&P
[address] [address]                              HISTORY AND PHYSICAL    PATIENT NAME: Mary Ann Michaels                      :        1945  MED REC NO:   [id number]                          ROOM:       [id number]  ACCOUNT NO:   [de-identified]                           ADMIT DATE: 11/15/2020  PROVIDER:     Maryam Valencia MD    I obtained the history and performed the physical exam on the patient in  the emergency room on 2020. CHIEF COMPLAINT:  \"I fell down/passed out\". HISTORY OF PRESENT ILLNESS:  The patient is a 67-year-old   female who was in her usual state of health at home when she states that  she feels like she passed out and fell down. Apparently, the fall was  witnessed by her son. The patient has got absolutely no recollection of  any events preceding the fall. No nausea or vomiting. No fevers or  chills. At the time of my examination in the ER, the patient states  that she is feeling much better and feels like she is back to her  baseline. PAST MEDICAL/PAST SURGICAL HISTORY:  1. Coronary artery disease. 2.  Hypertension. 3.  Dyslipidemia. 4.  Status post pacemaker placement. 5.  Ulcerative colitis. PAST SURGICAL HISTORY:  As noted above. ALLERGIC HISTORY:  The patient is allergic to PERCOCET. FAMILY HISTORY:  Reviewed by me and is currently noncontributory. SOCIAL HISTORY:  Nonsmoker. No illicit substance use. MEDICATIONS:  The patient's home medication list has been reviewed by me  and it has been documented in the EMR. The patient is on Colazal,  Keppra, Protonix, Plavix, lisinopril, Toprol XL, and Lipitor. REVIEW OF SYSTEMS:  Significant for the fall and per the history of  present illness. All other systems have been reviewed and are negative  except for the history of present illness.     PHYSICAL EXAMINATION:  The patient is examined by me in the emergency  room. VITAL SIGNS:  Temperature 98.1, respiratory rate is 15, pulse 86, blood  pressure initially was 213/105, saturating 96-97%. CNS:  Alert, awake and oriented currently. PSYCH:  The patient is cooperative, answering questions appropriately. HEENT:  Eyes:  Pupils are reactive to light. ENT:  Extraocular muscle  movements are intact. RESPIRATORY SYSTEM:  No obvious rales, rubs, or rhonchi. CARDIOVASCULAR:  Nontachycardic. No murmurs or rubs. ABDOMEN:  Nondistended. MUSCULOSKELETAL:  No acute deformities. SKIN:  Without rashes or lesions. The patient has got a left anterior  chest wall subcutaneous pacemaker. DIAGNOSTIC DATA:  Comprehensive metabolic panel showed glucose 104, BUN  12, creatinine 0.7. Sodium 138, potassium 3.5. White count 8.2,  hemoglobin 12.2, hematocrit 38.7, MCV 68.3, platelets of 827. CT head without contrast shows no acute intracranial anomaly. UA  negative for infection. Urine drug screen is negative. CONSULTATIONS REQUESTED:  I am requesting a consultation to Neurology. REVIEW OF PREVIOUS MEDICAL RECORDS:  Shows an echo from 01/2020 that  shows an LV ejection fraction of around 55% with grade 1 diastolic  dysfunction. ASSESSMENT:  1. Syncope and collapse. 2.  Hypertension. 3.  Coronary artery disease. 4.  Dyslipidemia. 5.  Ulcerative colitis. PLAN OF CARE:  The patient is admitted to Observation with Telemetry. Neurology consult has been requested. The patient's medication list  shows that the patient is on antiepileptic medication; however, there is  not a documented history of seizure disorder that I could ascertain. We  will obtain Neurology eval and follow their recommendations. We will  continue the patient on her home dose of antiplatelet agents. We will  continue the patient on her antidyslipidemic therapy. DVT prophylaxis  with Lovenox. Antihypertensive regimen will be continued. CODE STATUS:  Full.     EXPECTED LENGTH OF STAY:  Less than two midnights based on the plan of  care above. RISK:  High due to the patient's presentation with the syncope and  collapse. DISPOSITION:  Observation Telemetry.         Yuan Ly MD    D: 11/16/2020 2:15:43       T: 11/16/2020 3:21:14     [email or web address]  Job#: [id number]     Doc#: [id number]    CC:

## 2020-11-16 NOTE — PROGRESS NOTES
Hospitalist Progress Note      PCP: Mau Dougherty    Date of Admission: 11/15/2020      Subjective: feels ok, no family member at bedside, denies chest pain or palpitation, confused though. Medications:  Reviewed    Infusion Medications   Scheduled Medications    balsalazide  2,250 mg Oral TID    atorvastatin  40 mg Oral Daily    clopidogrel  75 mg Oral Daily    levETIRAcetam  250 mg Oral BID    lisinopril  20 mg Oral Daily    metoprolol succinate  25 mg Oral Daily    pantoprazole  40 mg Oral BID AC    sodium chloride flush  10 mL Intravenous 2 times per day    enoxaparin  40 mg Subcutaneous Daily     PRN Meds: sodium chloride flush, acetaminophen **OR** acetaminophen, promethazine **OR** ondansetron      Intake/Output Summary (Last 24 hours) at 11/16/2020 1348  Last data filed at 11/16/2020 1017  Gross per 24 hour   Intake 250 ml   Output 300 ml   Net -50 ml       Physical Exam Performed:    /75   Pulse 75   Temp 98.2 °F (36.8 °C) (Oral)   Resp 16   Ht 4' 10\" (1.473 m)   Wt 151 lb 14.4 oz (68.9 kg)   SpO2 93%   BMI 31.75 kg/m²     General appearance: No apparent distress  Neck: Supple  Respiratory:  Normal respiratory effort. Clear to auscultation, bilaterally without Rales/Wheezes/Rhonchi. Cardiovascular: Regular rate and rhythm with normal S1/S2 without murmurs, rubs or gallops. Abdomen: Soft, non-tender  Musculoskeletal: No clubbing, cyanosis   Skin: Skin color, texture, turgor normal.  No rashes or lesions.   Neurologic: moving her extremities   Psychiatric: confused   Capillary Refill: Brisk,< 3 seconds   Peripheral Pulses: +2 palpable, equal bilaterally       Labs:   Recent Labs     11/15/20  2016   WBC 8.2   HGB 12.2   HCT 38.7        Recent Labs     11/15/20  2016      K 3.5      CO2 25   BUN 12   CREATININE 0.7   CALCIUM 8.8     Recent Labs     11/15/20  2016   AST 14*   ALT 8*   BILITOT <0.2   ALKPHOS 107     No results for input(s): INR in the last 72 hours. Recent Labs     11/15/20  2016   TROPONINI <0.01       Urinalysis:      Lab Results   Component Value Date    NITRU Negative 11/15/2020    WBCUA 6 01/16/2020    BACTERIA 2+ 11/02/2010    RBCUA 3 01/16/2020    BLOODU Negative 11/15/2020    SPECGRAV 1.009 11/15/2020    GLUCOSEU Negative 11/15/2020    GLUCOSEU NEGATIVE 11/02/2010       Radiology:  CT Head WO Contrast   Final Result   No acute intracranial abnormality. 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 Old lacunar infarct in right head of caudate nucleus, stable. Mild parenchymal volume loss. Mild to moderate chronic microvascular disease. XR CHEST PORTABLE   Final Result   Radiographically clear lungs. Probable hiatal hernia. CT HEAD WO CONTRAST    (Results Pending)           Assessment/Plan:    Active Hospital Problems    Diagnosis    Acute encephalopathy [G93.40]    Fall [W19. XXXA]     1. Syncope and collapse, discussed with cardiology, unlikely cardiac etiology, tele monitoring. 2. Acute metabolic encephalopathy, confused, no clear etiology at this time, neurology consulted, discussed with patient, will need to stay in the hospital for further management. I will ad TSH, B12 pending. 3. CAD, no chest pain  4. Hypertensive urgency on admission, BP better controlled now. 5. UC, no active issues.        Diet: DIET GENERAL;  Code Status: Full Code        Dispo - inpatient     Kostas Jung MD

## 2020-11-16 NOTE — ED NOTES
Bed: A-17  Expected date:   Expected time:   Means of arrival: [facility]  Comments:  Allyson Galindo, [address]  11/15/20 5130

## 2020-11-16 NOTE — PROGRESS NOTES
4 Eyes Skin Assessment     NAME:  Maggie Salcedo  YOB: 1945  MEDICAL RECORD NUMBER:  [id number]    The patient is being assess for  Admission    I agree that 2 RN's have performed a thorough Head to Toe Skin Assessment on the patient. ALL assessment sites listed below have been assessed. Areas assessed by both nurses:    Head, Face, Ears, Shoulders, Back, Chest, Arms, Elbows, Hands, Sacrum. Buttock, Coccyx, Ischium and Legs. Feet and Heels        Does the Patient have a Wound?  No noted wound(s)       Albert Prevention initiated:  Yes   Wound Care Orders initiated:  NA    Pressure Injury (Stage 3,4, Unstageable, DTI, NWPT, and Complex wounds) if present place consult order under [de-identified] NA    New and Established Ostomies if present place consult order under : NA      Nurse 1 eSignature: Electronically signed by Trudy Carolina RN on 11/16/20 at 3:01 AM EST    **SHARE this note so that the co-signing nurse is able to place an eSignature**    Nurse 2 eSignature: Electronically signed by Cathy Hollis RN on 11/16/20 at 3:32 AM EST

## 2020-11-16 NOTE — PROGRESS NOTES
patient reports the only medication she takes is something for her colitis 3x's day. She does not know the name of it.

## 2020-11-16 NOTE — PROGRESS NOTES
I dont see anything that would be a definite cause for her fall but There are 4VHR events with the last being on 10/13/20 for 1 second. The longest was 7 seconds back in Aug and they appear 1:1 AV.    Thanks   [facility]

## 2020-11-16 NOTE — PROGRESS NOTES
Admitted to room 4113. Patient ambulated to bed with 2 person contact guard assist. Unsteady on feet. Reports she uses walker at home. Skin clean, dry and intact. Lungs cta. Respirations unlabored at 16 bpm. O2 sats 93% on RA. /79. Patient denies headache or nausea. She is very [place] alert and oriented x's4. Pure wick was placed in ED and continued here in the unit. POC reviwed with patient who verbalizes agreement. Fall precaution in place. Bed in lowest position with brakes locked. Bed alarm set. Call light and personal belongings within reach.

## 2020-11-17 PROCEDURE — 2580000003 HC RX 258: Performed by: INTERNAL MEDICINE

## 2020-11-17 PROCEDURE — 6370000000 HC RX 637 (ALT 250 FOR IP): Performed by: INTERNAL MEDICINE

## 2020-11-17 PROCEDURE — 6360000002 HC RX W HCPCS: Performed by: INTERNAL MEDICINE

## 2020-11-17 PROCEDURE — 1200000000 HC SEMI PRIVATE

## 2020-11-17 RX ORDER — LANOLIN ALCOHOL/MO/W.PET/CERES
1000 CREAM (GRAM) TOPICAL DAILY
Status: DISCONTINUED | OUTPATIENT
Start: 2020-11-17 | End: 2020-11-19 | Stop reason: HOSPADM

## 2020-11-17 RX ADMIN — BALSALAZIDE DISODIUM 2250 MG: 750 CAPSULE ORAL at 20:50

## 2020-11-17 RX ADMIN — CYANOCOBALAMIN TAB 1000 MCG 1000 MCG: 1000 TAB at 12:06

## 2020-11-17 RX ADMIN — Medication 30 MG: at 12:05

## 2020-11-17 RX ADMIN — CLOPIDOGREL BISULFATE 75 MG: 75 TABLET ORAL at 12:06

## 2020-11-17 RX ADMIN — BALSALAZIDE DISODIUM 2250 MG: 750 CAPSULE ORAL at 16:59

## 2020-11-17 RX ADMIN — Medication 30 MG: at 17:59

## 2020-11-17 RX ADMIN — SODIUM CHLORIDE, PRESERVATIVE FREE 10 ML: 5 INJECTION INTRAVENOUS at 13:16

## 2020-11-17 RX ADMIN — BALSALAZIDE DISODIUM 2250 MG: 750 CAPSULE ORAL at 12:05

## 2020-11-17 RX ADMIN — ATORVASTATIN CALCIUM 40 MG: 40 TABLET, FILM COATED ORAL at 12:06

## 2020-11-17 RX ADMIN — SODIUM CHLORIDE, PRESERVATIVE FREE 10 ML: 5 INJECTION INTRAVENOUS at 20:56

## 2020-11-17 RX ADMIN — LEVETIRACETAM 250 MG: 500 SOLUTION ORAL at 12:05

## 2020-11-17 RX ADMIN — ENOXAPARIN SODIUM 40 MG: 40 INJECTION SUBCUTANEOUS at 12:06

## 2020-11-17 RX ADMIN — LEVETIRACETAM 250 MG: 500 SOLUTION ORAL at 20:49

## 2020-11-17 RX ADMIN — LISINOPRIL 20 MG: 20 TABLET ORAL at 12:05

## 2020-11-17 ASSESSMENT — PAIN SCALES - GENERAL
PAINLEVEL_OUTOF10: 0

## 2020-11-17 NOTE — PROGRESS NOTES
Patient sitting in chair with chair alarm on. Patient able to tell me her name, the year, and that she's in the hospital. Says she doesn't know why she's in the hospital. Patient needs constant redirection and re-orientation. Will continue to monitor.  Electronically signed by Estephanie Figueroa RN on 11/17/2020 at 5:39 PM

## 2020-11-17 NOTE — PROGRESS NOTES
Progress Note    Updates  Patient is less confused today. No further headache. No significant events reported overnight.       Active Ambulatory Problems     Diagnosis Date Noted    Ankle fracture 07/26/2011    CAD (coronary artery disease) 07/26/2011    Pacemaker 07/26/2011    HTN (hypertension) 07/26/2011    Hypercholesteremia 07/26/2011    UC (ulcerative colitis) ([facility] [address] 07/26/2011    Intractable headache 06/28/2017    Acute blood loss anemia 01/18/2020    Atypical syncope 01/23/2020     Past Surgical History:   Procedure Laterality Date    ANKLE FRACTURE SURGERY  7/26/11    ORIF left ankle fracture    COLONOSCOPY N/A 1/20/2020    COLONOSCOPY WITH BIOPSY performed by Zbigniew Monterroso MD at [facility] [address]      x5   [address]    PACEMAKER INSERTION      RHINOPLASTY      UPPER GASTROINTESTINAL ENDOSCOPY N/A 1/20/2020    EGD BIOPSY performed by Zbigniew Monterroso MD at [address] [place] [address]-Administered Medications:     vitamin B-12 (CYANOCOBALAMIN) tablet 1,000 mcg, 1,000 mcg, Oral, Daily, Aleksandar Barnes MD    balsalazide (COLAZAL) capsule 2,250 mg, 2,250 mg, Oral, TID, Dorian Harrington MD, 2,250 mg at 11/16/20 2015    atorvastatin (LIPITOR) tablet 40 mg, 40 mg, Oral, Daily, Ad Hamilton MD    clopidogrel (PLAVIX) tablet 75 mg, 75 mg, Oral, Daily, Ad Hamilton MD    lisinopril (PRINIVIL;ZESTRIL) tablet 20 mg, 20 mg, Oral, Daily, Ad Hamilton MD    metoprolol succinate (TOPROL XL) extended release tablet 25 mg, 25 mg, Oral, Daily, Ad Hamilton MD    sodium chloride flush 0.9 % injection 10 mL, 10 mL, Intravenous, 2 times per day, Dorian Harrington MD, 10 mL at 11/16/20 2019    sodium chloride flush 0.9 % injection 10 mL, 10 mL, Intravenous, PRN, Ad Hamilton MD    acetaminophen (TYLENOL) tablet 650 mg, 650 mg, Oral, Q6H PRN **OR** acetaminophen (TYLENOL) suppository 650 mg, 650 mg, Rectal, Q6H PRN, Ad Hamilton MD    promethazine (PHENERGAN) tablet 12.5 mg, 12.5 mg, Oral, Q6H PRN **OR** ondansetron (ZOFRAN) injection 4 mg, 4 mg, Intravenous, Q6H PRN, Ad Hamilton MD    enoxaparin (LOVENOX) injection 40 mg, 40 mg, Subcutaneous, Daily, Ad Hamilton MD, 40 mg at 11/16/20 0940    levETIRAcetam (KEPPRA) 100 MG/ML solution 250 mg, 250 mg, Oral, BID, Ad Hamilton MD, 250 mg at 11/16/20 2218    lansoprazole suspension SUSP 30 mg, 30 mg, Oral, BID AC, Ad Hamilton MD, 30 mg at 11/16/20 1725    Exam  Blood pressure (!) 167/82, pulse 72, temperature 98 °F (36.7 °C), temperature source Oral, resp. rate 17, height 4' 10\" (1.473 m), weight 155 lb 3.3 oz (70.4 kg), SpO2 95 %, not currently breastfeeding. Constitutional    Vital signs: BP, HR, and RR reviewed   General alert, no distress  Eyes: unable to visualize the fundi  Cardiovascular: pulses symmetric in all 4 extremities. Psychiatric: cooperative with examination, no psychotic behavior noted. Neurologic  Mental status:   orientation to person, place, month, year. General fund of knowledge correctly identifies the current [employer]. Attention intact as able to attend well to the exam     Language no gross aphasia. Comprehension follows simple commands  Cranial nerves:   CN2: appears to blink to threat bilaterally. CN 3,4,6: extraocular muscles intact, she does track. Pupils are equal, round, reactive. CN7: face symmetric without dysarthria  CN8: very heard of hearing. CN12: tongue protrusion natali. Strength: moves all 4 limbs w/out any gross focal paresis. Sensory: light touch intact in all 4 extremities. Cerebellar/coordination: difficult to assess finger nose finger given hearing trouble. No obvious ataxia observed. Tone: normal in all 4 extremities  Gait: deferred at this time for safety.       ROS  Constitutional- No weight loss or fevers  Eyes- No diplopia. No photophobia. Ears/nose/throat- No dysphagia. No Dysarthria  Cardiovascular- No palpitations. No chest pain  Respiratory- No dyspnea. No Cough  Gastrointestinal- No Abdominal pain. No Vomiting. Genitourinary- No incontinence. No urinary retention  Musculoskeletal- No myalgia. No arthralgia  Skin- No rash. No easy bruising. Psychiatric- No depression. No anxiety  Endocrine- No diabetes. No thyroid issues. Hematologic- No bleeding difficulty. No fatigue  Neurologic- No weakness. No Headache. Labs  Folate 12.86  B12 - 275  Ammonia 13  TSH 1.77    UA negative  Drug screen negative    No other routine labs to review today. Studies  Follow up CT head w/o 11/16/20, independently reviewed  Stable. No acute abnormality or hemorrhage. CT head w/o 11/15/20  1. No acute intracranial abnormality. 2. 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 3. Old lacunar infarct in right head of caudate nucleus, stable. 4. Mild parenchymal volume loss. 5. Mild to moderate chronic microvascular disease.         EEG Jan 2020  Mild background slowing and disorganization. Gely Collar was also focal slowing and disorganization in the right temporooccipital region as well during the study. Impression  1. Fall w/ head impact, unclear if any LOC. This apparently has been an ongoing issue as she reportedly experiences several falls per month. She had been encephalopathic yesterday, though seems to be resolving (?hypertensive, ?post concussive). Follow up CT head last night was stable. Recommendations  1. Routine EEG, ordered. 2.  Continue Keppra. 3.  Agree w/ supplementing vitamin B12.    4.  BP control. 5.  Continue antiplatelet/statin for stroke prophylaxis. Cardiology commented about ?atrial fibrillation identified on pacemaker. If confirmed, ideally would be on anticoagulation from a stroke prevention standpoint if not contraindicated.   If high risk,

## 2020-11-17 NOTE — CARE COORDINATION
INITIAL CASE MANAGEMENT ASSESSMENT    Reviewed chart, spoke with patients son Ember Soriano at [phone] to assess possible discharge needs. Explained Case Management role/services. Living Situation: pt lives with son in his home , one step to porch and one to enter one floor plan. Home address has been confirmed. ADLs: pt was independent, son assisted with meds, groceries. DME: none    PT/OT Recs: pending     Active Services: none     Transportation: son     Medications: Martín Davidson at [facility]    PCP:Dr Soraya Mejias  HD/PD:none    PLAN/COMMENTS: provided info on home care vs snf to son. He is hoping pt will clear mentally to return home with him. Provided listing of home care agencies and he has chosen [facility] home care since pt has had it in past.  He denies any other dc needs at this time. Will await pt and ot evals for dc recommendation for home care. Son states pt had periods of confusion at home pta. And sometimes she refuses to take her medications. Electronically signed by CRISTAL Arzola on 11/17/2020 at 12:36 PM    SW/CM provided contact information for patient or family to call with any questions. SW/CM will follow and assist as needed.

## 2020-11-17 NOTE — PLAN OF CARE
Problem: Falls - Risk of:  Goal: Will remain free from falls  Description: Will remain free from falls  11/17/2020 1109 by Adele Mckinney RN  Outcome: Ongoing     Problem: Falls - Risk of:  Goal: Absence of physical injury  Description: Absence of physical injury  11/17/2020 1109 by Adele Mckinney RN  Outcome: Ongoing     Problem: Skin Integrity:  Goal: Will show no infection signs and symptoms  Description: Will show no infection signs and symptoms  11/17/2020 1109 by Adele Mckinney RN  Outcome: Ongoing     Problem: Skin Integrity:  Goal: Absence of new skin breakdown  Description: Absence of new skin breakdown  11/17/2020 1109 by Adele Mckinney RN  Outcome: Ongoing     Problem: Restraint Use - Nonviolent/Non-Self-Destructive Behavior:  Goal: Absence of restraint indications  Description: Absence of restraint indications  11/17/2020 1109 by Adele Mckinney RN  Outcome: Ongoing     Problem: Restraint Use - Nonviolent/Non-Self-Destructive Behavior:  Goal: Absence of restraint-related injury  Description: Absence of restraint-related injury  11/17/2020 1109 by Adele Mckinney RN  Outcome: Ongoing

## 2020-11-17 NOTE — PLAN OF CARE
Problem: Falls - Risk of:  Goal: Will remain free from falls  Description: Will remain free from falls  11/17/2020 0047 by Clarisa Alex RN  Outcome: Ongoing  11/16/2020 1337 by José Moran RN  Outcome: Ongoing  Goal: Absence of physical injury  Description: Absence of physical injury  11/17/2020 0047 by Clarisa Alex RN  Outcome: Ongoing  11/16/2020 1337 by José Moran RN  Outcome: Ongoing     Problem: Skin Integrity:  Goal: Will show no infection signs and symptoms  Description: Will show no infection signs and symptoms  11/17/2020 0047 by Clarisa Alex RN  Outcome: Ongoing  11/16/2020 1337 by José Moran RN  Outcome: Ongoing  Goal: Absence of new skin breakdown  Description: Absence of new skin breakdown  11/17/2020 0047 by Clarisa Alex RN  Outcome: Ongoing  11/16/2020 1337 by José Moran RN  Outcome: Ongoing     Problem: Restraint Use - Nonviolent/Non-Self-Destructive Behavior:  Goal: Absence of restraint indications  Description: Absence of restraint indications  Outcome: Ongoing  Goal: Absence of restraint-related injury  Description: Absence of restraint-related injury  Outcome: Ongoing

## 2020-11-17 NOTE — PROGRESS NOTES
Hospitalist Progress Note      PCP: Kedric Nageotte    Date of Admission: 11/15/2020    Subjective: still confused, but  Bit better, no chest pin, headache or nausea. Medications:  Reviewed    Infusion Medications   Scheduled Medications    balsalazide  2,250 mg Oral TID    atorvastatin  40 mg Oral Daily    clopidogrel  75 mg Oral Daily    lisinopril  20 mg Oral Daily    metoprolol succinate  25 mg Oral Daily    sodium chloride flush  10 mL Intravenous 2 times per day    enoxaparin  40 mg Subcutaneous Daily    levETIRAcetam  250 mg Oral BID    lansoprazole  30 mg Oral BID AC     PRN Meds: sodium chloride flush, acetaminophen **OR** acetaminophen, promethazine **OR** ondansetron      Intake/Output Summary (Last 24 hours) at 11/17/2020 0741  Last data filed at 11/16/2020 1840  Gross per 24 hour   Intake 250 ml   Output --   Net 250 ml       Physical Exam Performed:    BP (!) 167/82   Pulse 72   Temp 98 °F (36.7 °C) (Oral)   Resp 17   Ht 4' 10\" (1.473 m)   Wt 155 lb 3.3 oz (70.4 kg)   SpO2 95%   BMI 32.44 kg/m²     General appearance: No apparent distress  Neck: Supple  Respiratory:  Normal respiratory effort. Clear to auscultation, bilaterally without Rales/Wheezes/Rhonchi. Cardiovascular: Regular rate and rhythm with normal S1/S2 without murmurs, rubs or gallops. Abdomen: Soft, non-tender  Musculoskeletal: No clubbing, cyanosis   Skin: Skin color, texture, turgor normal.  No rashes or lesions.   Neurologic:  No focal weakness   Psychiatric: Awake, oriented to self, place, not to time   Capillary Refill: Brisk,< 3 seconds   Peripheral Pulses: +2 palpable, equal bilaterally       Labs:   Recent Labs     11/15/20  2016   WBC 8.2   HGB 12.2   HCT 38.7        Recent Labs     11/15/20  2016      K 3.5      CO2 25   BUN 12   CREATININE 0.7   CALCIUM 8.8     Recent Labs     11/15/20  2016   AST 14*   ALT 8*   BILITOT <0.2   ALKPHOS 107     No results for input(s): INR in

## 2020-11-17 NOTE — PROGRESS NOTES
Patient is alert to self only and has been sleeping all day except for meal time. Will continue to monitor.  Electronically signed by Leonora Golden RN on 11/17/2020 at 4:30 PM

## 2020-11-18 PROCEDURE — 97116 GAIT TRAINING THERAPY: CPT | Performed by: PHYSICAL THERAPIST

## 2020-11-18 PROCEDURE — 2580000003 HC RX 258: Performed by: INTERNAL MEDICINE

## 2020-11-18 PROCEDURE — 97530 THERAPEUTIC ACTIVITIES: CPT | Performed by: PHYSICAL THERAPIST

## 2020-11-18 PROCEDURE — 6370000000 HC RX 637 (ALT 250 FOR IP): Performed by: INTERNAL MEDICINE

## 2020-11-18 PROCEDURE — 1200000000 HC SEMI PRIVATE

## 2020-11-18 PROCEDURE — 6360000002 HC RX W HCPCS: Performed by: INTERNAL MEDICINE

## 2020-11-18 PROCEDURE — 97166 OT EVAL MOD COMPLEX 45 MIN: CPT

## 2020-11-18 PROCEDURE — 95819 EEG AWAKE AND ASLEEP: CPT

## 2020-11-18 PROCEDURE — 97535 SELF CARE MNGMENT TRAINING: CPT

## 2020-11-18 PROCEDURE — 97162 PT EVAL MOD COMPLEX 30 MIN: CPT | Performed by: PHYSICAL THERAPIST

## 2020-11-18 RX ADMIN — BALSALAZIDE DISODIUM 2250 MG: 750 CAPSULE ORAL at 21:49

## 2020-11-18 RX ADMIN — ATORVASTATIN CALCIUM 40 MG: 40 TABLET, FILM COATED ORAL at 11:09

## 2020-11-18 RX ADMIN — LISINOPRIL 20 MG: 20 TABLET ORAL at 11:10

## 2020-11-18 RX ADMIN — Medication 30 MG: at 17:18

## 2020-11-18 RX ADMIN — BALSALAZIDE DISODIUM 2250 MG: 750 CAPSULE ORAL at 15:33

## 2020-11-18 RX ADMIN — SODIUM CHLORIDE, PRESERVATIVE FREE 10 ML: 5 INJECTION INTRAVENOUS at 11:22

## 2020-11-18 RX ADMIN — SODIUM CHLORIDE, PRESERVATIVE FREE 10 ML: 5 INJECTION INTRAVENOUS at 21:53

## 2020-11-18 RX ADMIN — METOPROLOL SUCCINATE 25 MG: 25 TABLET, EXTENDED RELEASE ORAL at 11:09

## 2020-11-18 RX ADMIN — LEVETIRACETAM 250 MG: 500 SOLUTION ORAL at 11:10

## 2020-11-18 RX ADMIN — LEVETIRACETAM 250 MG: 500 SOLUTION ORAL at 21:49

## 2020-11-18 RX ADMIN — ENOXAPARIN SODIUM 40 MG: 40 INJECTION SUBCUTANEOUS at 11:11

## 2020-11-18 RX ADMIN — ACETAMINOPHEN 650 MG: 325 TABLET ORAL at 16:17

## 2020-11-18 RX ADMIN — BALSALAZIDE DISODIUM 2250 MG: 750 CAPSULE ORAL at 11:10

## 2020-11-18 RX ADMIN — CLOPIDOGREL BISULFATE 75 MG: 75 TABLET ORAL at 11:10

## 2020-11-18 RX ADMIN — CYANOCOBALAMIN TAB 1000 MCG 1000 MCG: 1000 TAB at 11:10

## 2020-11-18 RX ADMIN — Medication 30 MG: at 06:17

## 2020-11-18 ASSESSMENT — PAIN SCALES - GENERAL
PAINLEVEL_OUTOF10: 0
PAINLEVEL_OUTOF10: 5

## 2020-11-18 ASSESSMENT — PAIN DESCRIPTION - DESCRIPTORS: DESCRIPTORS: BURNING

## 2020-11-18 ASSESSMENT — PAIN DESCRIPTION - PAIN TYPE: TYPE: ACUTE PAIN

## 2020-11-18 ASSESSMENT — PAIN DESCRIPTION - FREQUENCY: FREQUENCY: INTERMITTENT

## 2020-11-18 ASSESSMENT — PAIN DESCRIPTION - LOCATION: LOCATION: ELBOW

## 2020-11-18 ASSESSMENT — PAIN DESCRIPTION - ORIENTATION: ORIENTATION: RIGHT

## 2020-11-18 NOTE — PROGRESS NOTES
Physical Therapy    Facility/Department: [address] MED SURG  Initial Assessment    NAME: Humberto Nichols  : 1945  MRN: [id number]    Date of Service: 2020    Discharge Recommendations:  Home with Home health PT, 2-3 sessions per week, S Level 1, 24 hour supervision or assist, Patient would benefit from continued therapy after discharge   PT Equipment Recommendations  Equipment Needed: No  Other: Pt stated having a lot of equipment at home from son receiving the equipment when he had a stroke in the past and pt stated she would be able to use that. HOME HEALTH CARE: LEVEL 1 STANDARD   -Initial home health evaluation to occur within 24-48 hours, in patient home    -Home health agency to establish plan of care for patient over 60 day period    -Medication Reconciliation    -PCP Visit scheduled within seven days of discharge    -PT/OT to evaluate with goal of regaining prior level of functioning    -OT to evaluate if patient has [address] needs for personal care     Humberto Nichols scored a 20/24 on the AM-PAC short mobility form. Current research shows that an AM-PAC score of 18 or greater is typically associated with a discharge to the patient's home setting. Based on the patient's AM-PAC score and their current functional mobility deficits, it is recommended that the patient have 2-3 sessions per week of Physical Therapy at d/c to increase the patient's independence. At this time, this patient demonstrates the endurance and safety to discharge home with home therapy and a follow up treatment frequency of 2-3x/wk. Please see assessment section for further patient specific details. If patient discharges prior to next session this note will serve as a discharge summary. Please see below for the latest assessment towards goals. Assessment   Body structures, Functions, Activity limitations: Decreased functional mobility ; Decreased balance;Decreased strength;Decreased safe awareness;Decreased endurance  Assessment: Per Nayla Walls MD, \"The patient is a 61-year-old Caucasianfemale who was in her usual state of health at home when she states thatshe feels like she passed out and fell down. Apparently, the fall waswitnessed by her son. \" Pt lives in a one level home with son who is able to assist her if needed. One KIMBERLY and no rails. PTA pt was independent except with driving which son is able to drive pt if needed. Today (11/18) during PT eval, pt was able to transfer sit<>stand with SBA while pushing up to stand with one UE and using bilat UEs when going to sit. Able to amb approx 40' on level tile with several turns around obstacles and SBA-CGA. CGA required for turns as pt demonstrated being slightly unsteady and reached for objects to assist with steadying. No AD used at this time as pt exhibited fair balance without AD prior to amb and reported not using AD PTA. Pt completed bed mobility with supervision-SBA. Pt limited by being [place], increased time to process commands, decreased strength, balance, and activity tolerance. Pt would benefit from continued skilled therapy after D/C home with son to maximize functional mobility, independence pt experienced PTA, and decrease burden of care on son. Treatment Diagnosis: decreased functional mobility  Decision Making: Medium Complexity  History: Per Nayla Walls MD, \"The patient is a 61-year-old Caucasianfemale who was in her usual state of health at home when she states thatshe feels like she passed out and fell down. Apparently, the fall waswitnessed by her son. The patient has got absolutely no recollection ofany events preceding the fall. No nausea or vomiting. No fevers orchills. At the time of my examination in the ER, the patient statesthat she is feeling much better and feels like she is back to [facility]. \"  Exam: Today (11/18) during PT eval, pt was able to transfer sit<>stand with SBA while pushing up to stand with one UE and using bilat UEs when going to sit. Able to amb approx 40' on level tile with several turns around obstacles and SBA-CGA. CGA required for turns as pt demonstrated being slightly unsteady and reached for objects to assist with steadying. No AD used at this time as pt exhibited fair balance without AD prior to amb and reported not using AD PTA. Pt completed bed mobility with supervision-SBA. Clinical Presentation: evolving  PT Education: Goals; General Safety;Gait Training;PT Role;Plan of Care; Functional Mobility Training;Transfer Training  Barriers to Learning: [place]  REQUIRES PT FOLLOW UP: Yes  Activity Tolerance  Activity Tolerance: Patient Tolerated treatment well(limited by being [facility])       Patient Diagnosis(es): The encounter diagnosis was Hypertensive encephalopathy. has a past medical history of Acute MI ([address] Eczema, [place] (hard of hearing), Hypercholesteremia, Hypertension, Pacemaker, Ulcerative colitis, and Wears hearing aid. has a past surgical history that includes Hysterectomy (1996); Pacemaker insertion; Coronary angioplasty with stent; Ankle fracture surgery (7/26/11); rhinoplasty; Upper gastrointestinal endoscopy (N/A, 1/20/2020); and Colonoscopy (N/A, 1/20/2020). Restrictions  Restrictions/Precautions  Restrictions/Precautions: Fall Risk    Vision/Hearing  Vision: Impaired  Vision Exceptions: Wears glasses at all times  Hearing: Exceptions to [facility]  Hearing Exceptions: Hard of hearing/hearing concerns(has hearing aids but states they do not work)       Subjective  General  Chart Reviewed: Yes  Patient assessed for rehabilitation services?: Yes  Additional Pertinent Hx: Per Yusef Cortez MD, \"The patient is a 60-year-old Caucasianfemale who was in her usual state of health at home when she states thatshe feels like she passed out and fell down. Apparently, the fall waswitnessed by her son. The patient has got absolutely no recollection ofany events preceding the fall.   No nausea or vomiting. No fevers orchills. At the time of my examination in the ER, the patient statesthat she is feeling much better and feels like she is back to [facility]. \"  Response To Previous Treatment: Not applicable  Family / Caregiver Present: No  Referring Practitioner: Harriet Elizondo MD  Referral Date : 11/18/20  Diagnosis: acute encephalopathy  Follows Commands: Within Functional Limits  Other (Comment): Requires extra time to follow commands due to pt being [address] and appearing to take extra time to process  General Comment  Comments: Pt [place] and requested that people talk loud and slow. Subjective  Subjective: Pt awake in chair, finishing in breakfast. Pleasant and agreeable to PT eval. Denies pain at this time. Pain Screening  Patient Currently in Pain: No     Orientation  Orientation  Overall Orientation Status: Impaired  Orientation Level: Oriented to person;Disoriented to place; Disoriented to time(pt knew was in a hospital but unsure which one, thought it was December instead of November)    Social/Functional History  Social/Functional History  Lives With: Son  Type of Home: House  Home Layout: One level  Home Access: Stairs to enter without rails  Entrance Stairs - Number of Steps: 1  Entrance Stairs - Rails: None  Bathroom Shower/Tub: Tub/Shower unit  Bathroom Toilet: Standard  Bathroom Equipment: Shower chair  Bathroom Accessibility: Wheelchair accessible  Home Equipment: Rolling walker  Joe Help From: Family(son)  ADL Assistance: Independent  Homemaking Assistance: Independent  Homemaking Responsibilities: Yes  Ambulation Assistance: Independent  Transfer Assistance: Independent  Active : No(son drives pt)  Mode of Transportation: Car  Occupation: Retired  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 Cognition   Cognition  Overall Cognitive Status: Exceptions  Arousal/Alertness: Delayed responses to stimuli  Following Commands: Follows one step commands with repetition; Follows one step commands with increased time  Attention Span: Appears intact  Memory: Decreased short term memory;Decreased recall of recent events;Decreased recall of biographical Information  Safety Judgement: Decreased awareness of need for assistance;Decreased awareness of need for safety  Problem Solving: Assistance required to correct errors made  Insights: Decreased awareness of deficits  Initiation: Requires cues for some  Sequencing: Requires cues for some  Cognition Comment: may be partially attributed to being [place]    Objective  AROM RLE (degrees)  RLE AROM: WFL  AROM LLE (degrees)  LLE AROM : WFL  AROM RUE (degrees)  RUE AROM : WFL  AROM LUE (degrees)  LUE AROM : WFL  Strength RLE  Strength RLE: WFL  Strength LLE  Strength LLE: WFL  Strength RUE  Strength RUE: WFL  Strength LUE  Strength LUE: WFL        Bed mobility  Rolling to Left: Supervision  Rolling to Right: Supervision  Supine to Sit: Stand by assistance  Sit to Supine: Stand by assistance  Scooting: Supervision  Comment: Flat hosp bed with 1 pillow, pt did not utilize railing  Transfers  Sit to Stand: Stand by assistance  Stand to sit: Stand by assistance  Comment: Pt used one UE to assist her with sit to stand and bilat UEs for stand to sit.   Ambulation  Ambulation?: Yes  More Ambulation?: No  Ambulation 1  Surface: level tile  Device: No Device  Assistance: Stand by assistance;Contact guard assistance  Quality of Gait: decreased stance time on RLE causing decreased stance time on LLE, slightly flexed posture, ER of RLE, decreased DF, occasionally reached for objects to assist with steadying  Gait Deviations: Decreased step length;Decreased step height;Decreased arm swing;Decreased head and trunk rotation;Staggers  Distance: approx 36' with several turns around obstacles  Comments: Pt was stable when amb a straight path and without AD, CGA required for turns due to slight unsteadiness and pt would reach for object to hold onto when turning. Stairs/Curb  Stairs?: No     Balance  Posture: Fair  Sitting - Static: Good  Sitting - Dynamic: Fair;+  Standing - Static: Fair;+  Standing - Dynamic: [address]  Times per week: 3-5  Plan weeks: during acute stay  Current Treatment Recommendations: Strengthening, Home Exercise Program, Safety Education & Training, Balance Training, Endurance Training, Patient/Caregiver Education & Training, Functional Mobility Training, Transfer Training, Gait Training, Stair training  Safety Devices  Type of devices: All fall risk precautions in place, Call light within reach, Chair alarm in place, Gait belt, Patient at risk for falls, Left in chair, Nurse notified(RN in room with pt)  Restraints  Initially in place: No      AM-PAC Score  AM-PAC Inpatient Mobility Raw Score : 20 (11/18/20 1123)  AM-PAC Inpatient T-Scale Score : 47.67 (11/18/20 1123)  Mobility Inpatient CMS 0-100% Score: 35.83 (11/18/20 1123)  Mobility Inpatient CMS G-Code Modifier : Hobert Mario (11/18/20 1123)        Goals  Short term goals  Time Frame for Short term goals: during acute stay  Short term goal 1: bed mobility independently  Short term goal 2: ambulate household distances with supervision and without AD  Short term goal 3: transfer independently  Long term goals  Time Frame for Long term goals : STG=LTG  Patient Goals   Patient goals : go home with son     Therapy Time   Individual Concurrent Group Co-treatment   Time In 1025         Time Out 1125         Minutes 60         Timed Code Treatment Minutes: [address], [facility]  Therapist was present, directed the patient's care, made skilled judgement, and was responsible for assessment and treatment of the patient.          Electronically signed by Sean Meehan PT on 11/18/20 at 3:45 PM EST

## 2020-11-18 NOTE — PROGRESS NOTES
Progress Note    Updates  Some confusion intermittently. Overall, clinically she is improving.       Active Ambulatory Problems     Diagnosis Date Noted    Ankle fracture 07/26/2011    CAD (coronary artery disease) 07/26/2011    Pacemaker 07/26/2011    HTN (hypertension) 07/26/2011    Hypercholesteremia 07/26/2011    UC (ulcerative colitis) ([facility] [address] 07/26/2011    Intractable headache 06/28/2017    Acute blood loss anemia 01/18/2020    Atypical syncope 01/23/2020     Past Surgical History:   Procedure Laterality Date    ANKLE FRACTURE SURGERY  7/26/11    ORIF left ankle fracture    COLONOSCOPY N/A 1/20/2020    COLONOSCOPY WITH BIOPSY performed by Israel Juarez MD at [facility]      x5   [address]    PACEMAKER INSERTION      RHINOPLASTY      UPPER GASTROINTESTINAL ENDOSCOPY N/A 1/20/2020    EGD BIOPSY performed by Israel Juarez MD at [address] [place] [address] Facility-Administered Medications:     vitamin B-12 (CYANOCOBALAMIN) tablet 1,000 mcg, 1,000 mcg, Oral, Daily, Aleksandar Barnes MD, 1,000 mcg at 11/17/20 1206    balsalazide (COLAZAL) capsule 2,250 mg, 2,250 mg, Oral, TID, Estelita Dominguez MD, 2,250 mg at 11/17/20 2050    atorvastatin (LIPITOR) tablet 40 mg, 40 mg, Oral, Daily, Ad Hamilton MD, 40 mg at 11/17/20 1206    clopidogrel (PLAVIX) tablet 75 mg, 75 mg, Oral, Daily, Ad Hamilton MD, 75 mg at 11/17/20 1206    lisinopril (PRINIVIL;ZESTRIL) tablet 20 mg, 20 mg, Oral, Daily, Ad Hamilton MD, 20 mg at 11/17/20 1205    metoprolol succinate (TOPROL XL) extended release tablet 25 mg, 25 mg, Oral, Daily, Ad Hamilton MD    sodium chloride flush 0.9 % injection 10 mL, 10 mL, Intravenous, 2 times per day, Estelita Dominguez MD, 10 mL at 11/17/20 2056    sodium chloride flush 0.9 % injection 10 mL, 10 mL, Intravenous, PRN, Estelita Dominguez MD    acetaminophen (TYLENOL) tablet 650 mg, 650 mg, Oral, Q6H PRN **OR** acetaminophen (TYLENOL) suppository 650 mg, 650 mg, Rectal, Q6H PRN, Ad Hamilton MD    promethazine (PHENERGAN) tablet 12.5 mg, 12.5 mg, Oral, Q6H PRN **OR** ondansetron (ZOFRAN) injection 4 mg, 4 mg, Intravenous, Q6H PRN, Ad Hamilton MD    enoxaparin (LOVENOX) injection 40 mg, 40 mg, Subcutaneous, Daily, Ad Hamilton MD, 40 mg at 11/17/20 1206    levETIRAcetam (KEPPRA) 100 MG/ML solution 250 mg, 250 mg, Oral, BID, Ad Hamilton MD, 250 mg at 11/17/20 2049    lansoprazole suspension SUSP 30 mg, 30 mg, Oral, BID AC, Ad Hamilton MD, 30 mg at 11/18/20 0617    Exam  Blood pressure 110/77, pulse 69, temperature 97.6 °F (36.4 °C), temperature source Oral, resp. rate 16, height 4' 10\" (1.473 m), weight 155 lb 3.3 oz (70.4 kg), SpO2 92 %, not currently breastfeeding. Constitutional                          Vital signs: BP, HR, and RR reviewed            General alert, no distress  Eyes: unable to visualize the fundi  Cardiovascular: pulses symmetric in all 4 extremities. Psychiatric: cooperative with examination, no psychotic behavior noted. Neurologic  Mental status:   orientation to person, says she is in the hospital and the month is December. Correctly identified the year as 2020 . Attention intact as able to attend well to the exam                Language no gross aphasia. Comprehension follows simple commands  Cranial nerves:   CN2: appears to blink to threat bilaterally. CN 3,4,6: extraocular muscles intact, she does track. Pupils are equal, round, reactive. CN7: face symmetric without dysarthria  CN8: very heard of hearing. CN12: tongue protrusion natali. Strength: moves all 4 limbs w/out any gross focal paresis. Sensory: light touch intact in all 4 extremities. Cerebellar/coordination: no ataxia in her BUE.     Tone: normal in all 4 extremities  Gait: deferred at this time for safety. ROS  Constitutional- No weight loss or fevers  Eyes- No diplopia. No photophobia. Ears/nose/throat- No dysphagia. No Dysarthria  Cardiovascular- No palpitations. No chest pain  Respiratory- No dyspnea. No Cough  Gastrointestinal- No Abdominal pain. No Vomiting. Genitourinary- No incontinence. No urinary retention  Musculoskeletal- No myalgia. No arthralgia  Skin- No rash. No easy bruising. Psychiatric- No depression. No anxiety  Endocrine- No diabetes. No thyroid issues. Hematologic- No bleeding difficulty. No fatigue  Neurologic- No weakness. No Headache. Labs  Folate 12.86  B12 - 275  Ammonia 13  TSH 1.77     UA negative  Drug screen negative    No other routine labs to review at this time. Studies  Follow up CT head w/o 11/16/20  Stable. No acute abnormality or hemorrhage.       CT head w/o 11/15/20  1. No acute intracranial abnormality.    2. 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    3. Old lacunar infarct in right head of caudate nucleus, stable.    4. Mild parenchymal volume loss.    5. Mild to moderate chronic microvascular disease.         EEG Jan 2020  Mild background slowing and disorganization.  There was also focal slowing and disorganization in the right temporooccipital region as well during the study. Impression  1. Fall w/ head impact, unclear if any LOC. This apparently has been an ongoing issue as she reportedly experiences several falls per month. She had been encephalopathic though progressively improving (?hypertensive, ?post concussive). Follow up CT head was stable. Recommendations  1. EEG done. Formal read pending. 2.  Continue Keppra. 3.  Supplement B12.    4.  Plavix/statin for stroke prophylaxis. 5.  Cardiology commented about ?atrial fibrillation identified on pacemaker. If confirmed, ideally would be on anticoagulation from a stroke prevention standpoint if not contraindicated.   If high risk, would consider alterative measures. She will need to follow up w/ her Cardiologist.    Will review EEG result when read and address if needed. Otherwise, will sign off. Please call back w/ any additional questions or concerns. Thank you.       Brooke Cerda NP  [address] [place] [address] [address] Neurology    A copy of this note was provided for Dr Sandra Trimble MD

## 2020-11-18 NOTE — PLAN OF CARE
Problem: Falls - Risk of:  Goal: Will remain free from falls  Description: Will remain free from falls  11/18/2020 0932 by Janet Witt RN  Outcome: Ongoing     Problem: Falls - Risk of:  Goal: Absence of physical injury  Description: Absence of physical injury  11/18/2020 0932 by Janet Witt RN  Outcome: Ongoing     Problem: Skin Integrity:  Goal: Will show no infection signs and symptoms  Description: Will show no infection signs and symptoms  11/18/2020 0932 by Janet Witt RN  Outcome: Ongoing     Problem: Skin Integrity:  Goal: Absence of new skin breakdown  Description: Absence of new skin breakdown  11/18/2020 0932 by Janet Witt RN  Outcome: Ongoing

## 2020-11-18 NOTE — PLAN OF CARE
Problem: Falls - Risk of:  Goal: Will remain free from falls  Description: Will remain free from falls  11/17/2020 2200 by Joesph Sin RN  Outcome: Ongoing  11/17/2020 1109 by Natalee Cope RN  Outcome: Ongoing  Goal: Absence of physical injury  Description: Absence of physical injury  11/17/2020 2200 by Joesph Sin RN  Outcome: Ongoing  11/17/2020 1109 by Natalee Cope RN  Outcome: Ongoing     Problem: Skin Integrity:  Goal: Will show no infection signs and symptoms  Description: Will show no infection signs and symptoms  11/17/2020 2200 by Joesph Sin RN  Outcome: Ongoing  11/17/2020 1109 by Natalee Cope RN  Outcome: Ongoing  Goal: Absence of new skin breakdown  Description: Absence of new skin breakdown  11/17/2020 2200 by Joesph Sin RN  Outcome: Ongoing  11/17/2020 1109 by Natalee Cope RN  Outcome: Ongoing     Problem: Restraint Use - Nonviolent/Non-Self-Destructive Behavior:  Goal: Absence of restraint indications  Description: Absence of restraint indications  11/17/2020 2200 by Joesph Sin RN  Outcome: Ongoing  11/17/2020 1109 by Natalee Cope RN  Outcome: Ongoing  Goal: Absence of restraint-related injury  Description: Absence of restraint-related injury  11/17/2020 2200 by Joesph Sin RN  Outcome: Ongoing  11/17/2020 1109 by Natalee Cope RN  Outcome: Ongoing

## 2020-11-18 NOTE — PROGRESS NOTES
Hospitalist Progress Note      PCP: Marie Torres    Date of Admission: 11/15/2020      Subjective: feels better, much more coherent this am, no chest pain or SOB, no palpitation. Medications:  Reviewed    Infusion Medications   Scheduled Medications    vitamin B-12  1,000 mcg Oral Daily    balsalazide  2,250 mg Oral TID    atorvastatin  40 mg Oral Daily    clopidogrel  75 mg Oral Daily    lisinopril  20 mg Oral Daily    metoprolol succinate  25 mg Oral Daily    sodium chloride flush  10 mL Intravenous 2 times per day    enoxaparin  40 mg Subcutaneous Daily    levETIRAcetam  250 mg Oral BID    lansoprazole  30 mg Oral BID AC     PRN Meds: sodium chloride flush, acetaminophen **OR** acetaminophen, promethazine **OR** ondansetron      Intake/Output Summary (Last 24 hours) at 11/18/2020 0844  Last data filed at 11/17/2020 1400  Gross per 24 hour   Intake 60 ml   Output --   Net 60 ml       Physical Exam Performed:    /77   Pulse 69   Temp 97.6 °F (36.4 °C) (Oral)   Resp 16   Ht 4' 10\" (1.473 m)   Wt 155 lb 3.3 oz (70.4 kg)   SpO2 92%   BMI 32.44 kg/m²     General appearance: No apparent distress  Neck: Supple  Respiratory:  Normal respiratory effort. Clear to auscultation, bilaterally without Rales/Wheezes/Rhonchi. Cardiovascular: Regular rate and rhythm with normal S1/S2 without murmurs, rubs or gallops. Abdomen: Soft, non-tender, non-distended with normal bowel sounds. Musculoskeletal: No clubbing, cyanosis   Skin: Skin color, texture, turgor normal.  No rashes or lesions.   Neurologic:  No focal weakness   Psychiatric: Alert and oriented to place, time, partially to person   Capillary Refill: Brisk,< 3 seconds   Peripheral Pulses: +2 palpable, equal bilaterally       Labs:   Recent Labs     11/15/20  2016   WBC 8.2   HGB 12.2   HCT 38.7        Recent Labs     11/15/20  2016      K 3.5      CO2 25   BUN 12   CREATININE 0.7   CALCIUM 8.8     Recent Labs 11/15/20  2016   AST 14*   ALT 8*   BILITOT <0.2   ALKPHOS 107     No results for input(s): INR in the last 72 hours. Recent Labs     11/15/20  2016   TROPONINI <0.01       Urinalysis:      Lab Results   Component Value Date    NITRU Negative 11/15/2020    WBCUA 6 01/16/2020    BACTERIA 2+ 11/02/2010    RBCUA 3 01/16/2020    BLOODU Negative 11/15/2020    SPECGRAV 1.009 11/15/2020    GLUCOSEU Negative 11/15/2020    GLUCOSEU NEGATIVE 11/02/2010       Radiology:  CT HEAD WO CONTRAST   Final Result   No acute intracranial abnormality. CT Head WO Contrast   Final Result   No acute intracranial abnormality. 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 Old lacunar infarct in right head of caudate nucleus, stable. Mild parenchymal volume loss. Mild to moderate chronic microvascular disease. XR CHEST PORTABLE   Final Result   Radiographically clear lungs. Probable hiatal hernia. Assessment/Plan:    Active Hospital Problems    Diagnosis    Acute encephalopathy [G93.40]   [facility] Fall [W19. XXXA]    Acute metabolic encephalopathy [C24.29]     1. Syncope and collapse, discussed with cardiology, unlikely cardiac etiology, tele monitoring. 2. Acute metabolic encephalopathy, confusion clearing up, no clear etiology at this time, neurology consulted. TSH WNL, B12 borderline, added supplement. 3. Fall with head trauma, fall precautions, neurology consulted. 4. Hypertensive urgency on admission, BP better controlled now. 5. UC, no active issues.   6. CAD, no chest pain  7. Possible episode of afib on the device back on 08/14/2019 per cardiology. CHADS2 6, discussed with cardiology to evaluate if she is a candidate for watchman with her high risk of stroke and bleeding at the same time.        Diet: DIET GENERAL;  Code Status: Full Code        Doreen Santiago MD

## 2020-11-19 VITALS
TEMPERATURE: 98.9 F | SYSTOLIC BLOOD PRESSURE: 139 MMHG | WEIGHT: 151.24 LBS | RESPIRATION RATE: 17 BRPM | OXYGEN SATURATION: 95 % | HEIGHT: 58 IN | BODY MASS INDEX: 31.75 KG/M2 | HEART RATE: 73 BPM | DIASTOLIC BLOOD PRESSURE: 84 MMHG

## 2020-11-19 PROCEDURE — 6370000000 HC RX 637 (ALT 250 FOR IP): Performed by: INTERNAL MEDICINE

## 2020-11-19 PROCEDURE — 97535 SELF CARE MNGMENT TRAINING: CPT

## 2020-11-19 PROCEDURE — 97116 GAIT TRAINING THERAPY: CPT | Performed by: PHYSICAL THERAPIST

## 2020-11-19 PROCEDURE — 6360000002 HC RX W HCPCS: Performed by: INTERNAL MEDICINE

## 2020-11-19 PROCEDURE — 94760 N-INVAS EAR/PLS OXIMETRY 1: CPT

## 2020-11-19 RX ADMIN — ATORVASTATIN CALCIUM 40 MG: 40 TABLET, FILM COATED ORAL at 09:38

## 2020-11-19 RX ADMIN — ENOXAPARIN SODIUM 40 MG: 40 INJECTION SUBCUTANEOUS at 09:49

## 2020-11-19 RX ADMIN — METOPROLOL SUCCINATE 25 MG: 25 TABLET, EXTENDED RELEASE ORAL at 09:39

## 2020-11-19 RX ADMIN — CLOPIDOGREL BISULFATE 75 MG: 75 TABLET ORAL at 09:38

## 2020-11-19 RX ADMIN — LISINOPRIL 20 MG: 20 TABLET ORAL at 09:38

## 2020-11-19 RX ADMIN — Medication 30 MG: at 06:25

## 2020-11-19 RX ADMIN — CYANOCOBALAMIN TAB 1000 MCG 1000 MCG: 1000 TAB at 09:38

## 2020-11-19 RX ADMIN — BALSALAZIDE DISODIUM 2250 MG: 750 CAPSULE ORAL at 09:38

## 2020-11-19 RX ADMIN — LEVETIRACETAM 250 MG: 500 SOLUTION ORAL at 09:39

## 2020-11-19 ASSESSMENT — PAIN SCALES - GENERAL: PAINLEVEL_OUTOF10: 0

## 2020-11-19 NOTE — PLAN OF CARE
Problem: Falls - Risk of:  Goal: Will remain free from falls  Description: Will remain free from falls  11/19/2020 1223 by Lauren Roy RN  Outcome: Completed  11/18/2020 2236 by Kayla Castro RN  Outcome: Ongoing  Goal: Absence of physical injury  Description: Absence of physical injury  11/19/2020 1223 by Lauren Roy RN  Outcome: Completed  11/18/2020 2236 by Kayla Castro RN  Outcome: Ongoing     Problem: Skin Integrity:  Goal: Will show no infection signs and symptoms  Description: Will show no infection signs and symptoms  11/19/2020 1223 by Lauren Roy RN  Outcome: Completed  11/18/2020 2236 by Kayla Castro RN  Outcome: Ongoing  Goal: Absence of new skin breakdown  Description: Absence of new skin breakdown  11/19/2020 1223 by Lauren Roy RN  Outcome: Completed  11/18/2020 2236 by Kayla Castro RN  Outcome: Ongoing     Problem: Restraint Use - Nonviolent/Non-Self-Destructive Behavior:  Goal: Absence of restraint indications  Description: Absence of restraint indications  11/19/2020 1223 by Lauren Roy RN  Outcome: Completed  11/18/2020 2236 by Kayla Castro RN  Outcome: Ongoing  Goal: Absence of restraint-related injury  Description: Absence of restraint-related injury  11/19/2020 1223 by Lauren Roy RN  Outcome: Completed  11/18/2020 2236 by Kayla Castro RN  Outcome: Ongoing

## 2020-11-19 NOTE — DISCHARGE INSTR - COC
Continuity of Care Form    Patient Name: Madison Olvera   :  1945  MRN:  [id number]    [facility] date:  11/15/2020  Discharge date:  2002    Code Status Order: Full Code   Advance Directives:   [facility] Documentation       Date/Time Healthcare Directive Type of Healthcare Directive Copy in [address] Agent's Name Healthcare Agent's Phone Number    [phone]  No, patient does not have an advance directive for healthcare treatment -- -- -- -- --            Admitting Physician:  Elizabeth Wan MD  PCP: Pauline Mcclure    Discharging Nurse: Mil Vergara [facility] Unit/Room#: Y8C-4074/4488-13  Discharging Unit Phone Number: [phone]    Emergency Contact:   Extended Emergency Contact Information  Primary Emergency Contact: [facility] Phone: [phone]  Mobile Phone: [phone]  Relation: Child  Secondary Emergency Contact: Brett Sutton  [place] Phone: [phone]  Relation: Child    Past Surgical History:  Past Surgical History:   Procedure Laterality Date    ANKLE FRACTURE SURGERY  11    ORIF left ankle fracture    COLONOSCOPY N/A 2020    COLONOSCOPY WITH BIOPSY performed by Rebecca Walters MD at [address] [place] [address] 40      x5   [facility]      RHINOPLASTY      UPPER GASTROINTESTINAL ENDOSCOPY N/A 2020    EGD BIOPSY performed by Rebecca Walters MD at [address] [place] [address]       Immunization History:   Immunization History   Administered Date(s) Administered    Influenza Vaccine, unspecified formulation 2007, 2016    PPD Test 2020    Pneumococcal Conjugate 13-valent ([id number]) 2017    Pneumococcal Conjugate Vaccine 2017       Active Problems:  Patient Active Problem List   Diagnosis Code    Ankle fracture S82.899A    CAD (coronary artery disease) I25.10    Pacemaker Z95.0    HTN (hypertension) I10    Hypercholesteremia E78.00    UC (ulcerative colitis) ([facility] [address] K51.90    Intractable headache R51.9    Acute blood loss anemia D62    Atypical syncope R55    Acute encephalopathy G93.40    Fall W19. [place]    Acute metabolic encephalopathy Y26.51       Isolation/Infection:   Isolation            No Isolation          Patient Infection Status       None to display            Nurse Assessment:  Last Vital Signs: BP (!) 146/80   Pulse 67   Temp 98.4 °F (36.9 °C) (Oral)   Resp 18   Ht 4' 10\" (1.473 m)   Wt 151 lb 3.8 oz (68.6 kg)   SpO2 95%   BMI 31.61 kg/m²     Last documented pain score (0-10 scale): Pain Level: 0  Last Weight:   Wt Readings from Last 1 Encounters:   11/19/20 151 lb 3.8 oz (68.6 kg)     Mental Status:  oriented, alert, coherent and thought processes intact    IV Access:  - None    Nursing Mobility/ADLs:  Walking   Assisted  Transfer  Assisted  Bathing  Assisted  Dressing  Assisted  Toileting  Independent  Feeding  Independent  Med Admin  Independent  Med Delivery   whole    Wound Care Documentation and Therapy:  Incision 07/26/11 Ankle Left (Active)   Number of days: 3403        Elimination:  Continence:   · Bowel: Yes  · Bladder: Yes  Urinary Catheter: None   Colostomy/Ileostomy/Ileal Conduit: No       Date of Last BM: 11/19/2020    Intake/Output Summary (Last 24 hours) at 11/19/2020 0817  Last data filed at 11/18/2020 1534  Gross per 24 hour   Intake 600 ml   Output --   Net 600 ml     I/O last 3 completed shifts:   In: 600 [P.O.:600]  Out: -     Safety Concerns:     Sundowners [place] and At Risk for Falls    Impairments/Disabilities:      Hearing    Nutrition Therapy:  Current Nutrition Therapy:   [address] [employer] Diet List:[id number]:::0}    Routes of Feeding: {CHP DME Other Feedings:340359615:::0}  Liquids: {Slp liquid thickness:56908}  Daily Fluid Restriction: {CHP DME Yes amt example:859501086:::0}  Last Modified Barium Swallow with Video (Video Swallowing Test): {Done Not Done

## 2020-11-19 NOTE — PROGRESS NOTES
Occupational Therapy  Facility/Department: [facility]  Daily Treatment Note  NAME: Maria M Hampton  : 1945  MRN: [id number]    Date of Service: 2020    Discharge Recommendations:  Continue to assess pending progress, Patient would benefit from continued therapy after discharge, 3-5 sessions per week  OT Equipment Recommendations  Equipment Needed: No  Other: owns RW & shower chair  Maria M Hampton scored a 21/24 on the AM-PAC ADL Inpatient form. Current research shows that an AM-PAC score of 18 or greater is typically associated with a discharge to the patient's home setting. Based on the patient's AM-PAC score, and their current ADL deficits, it is recommended that the patient have 3-5 sessions per week of Occupational Therapy at d/c to increase the patient's independence. At this time, this patient demonstrates the endurance and safety to discharge home with [facility] OT and HHA and a follow up treatment frequency of 2-3x/wk. Please see assessment section for further patient specific details. If patient discharges prior to next session this note will serve as a discharge summary. Please see below for the latest assessment towards goals. HOME HEALTH CARE: LEVEL 3 SAFETY        -Initial home health evaluation to occur within 24-48 hours, in patient home    -Home health agency to establish plan of care for patient over 60 day period    -Medication Reconciliation    -PT/OT/Speech evaluations in home within 24-48 hours of discharge; including  -DME and home safety    -Frontload therapy 5 days, then 3x a week    -OT to evaluate if patient has [address] needs for personal care    - evaluation within 24-48 hours, includes evaluation of resources   and insurance to determine [place], [place], LTC, and Medicaid options    -PCP Visit scheduled within three to seven days of discharge     Assessment   Performance deficits / Impairments: Decreased endurance;Decreased functional mobility ; Decreased ADL status; Decreased strength;Decreased balance;Decreased safe awareness  Assessment: pt has met 2 goals w/ OT intervention. She is mostly SBA using RW for household t/f & fxl mobility however has episodes of posterior LOB, requiring up to min A to recover--pt seems unaware but does not use any safety methods of holding onto sink or GB for support (does not self-correct LOB). Pt remains a high fall risk & will need son to provide close SB/CGA when pt using RW & during LB ADLs. Recommend Level 3 home care & HHA to assist w/ safety during showers & dressing. Treatment Diagnosis: impaired ADLs, t/f, balance, safety  Prognosis: Good  History: see chart  Exam: impaired ADls, t/f, balance  Assistance / Modification: cognitive & balance deficits impactp's ability to be safe during ADLs & t/f, required CG to min A using RW  OT Education: ADL Adaptive Strategies;Transfer Training  Patient Education: educated on remaining seated for majority of ADL tasks to reduce fall risk, given cues for safe hand placement during t/f  Barriers to Learning: cognition  REQUIRES OT FOLLOW UP: Yes  Activity Tolerance  Activity Tolerance: Patient Tolerated treatment well;Treatment limited secondary to decreased cognition  Activity Tolerance: pt is cooperative but inconsistently follows commands, is [place], limited eyesight and decreased insight into current situation  Safety Devices  Safety Devices in place: Yes  Type of devices: Call light within reach;Nurse notified; Left in chair;Chair alarm in place;Gait belt         Patient Diagnosis(es): The encounter diagnosis was Hypertensive encephalopathy. has a past medical history of Acute MI ([facility] [address] Eczema, [place] (hard of hearing), Hypercholesteremia, Hypertension, Pacemaker, Ulcerative colitis, and Wears hearing aid. has a past surgical history that includes Hysterectomy (1996); Pacemaker insertion; Coronary angioplasty with stent; Ankle fracture surgery (7/26/11); rhinoplasty;  Upper dried thighs<>feet, stood w/ min A to wash, rinse & dry radha areas; posterior LOB, gave cues to keep 1 hand on GB AATs)  UE Dressing: Setup(able to don bra & shirt while seated)  LE Dressing: Contact guard assistance(gave steadying assistance when managing clothing over hips & buttocks)  Toileting: Stand by assistance(safety concerns while in standing during clothing management, posterior sway)  Additional Comments: pt unsteady while in standing, posterior leaning requiring up to min A to correct, does not hold onto GB or sink for support & required cues; pt remains a high fall risk        Balance  Sitting Balance: Supervision(posterior lean especially when fatigued; does not sit in middle of seat)  Standing Balance: Contact guard assistance(mostly CG up to min A d/t posterior leaning during LB ADLs)  Standing Balance  Time: @ 2 minutes  Activity: ADLs, t/f  Comment: posterior sway/lean during toileting, grooming & t/f using support of GB, sink or RW  Functional Mobility  Functional - Mobility Device: Rolling Walker  Activity: To/from bathroom  Assist Level: Stand by assistance  Functional Mobility Comments: Close SBA to use RW from recliner <>bathrm & completed toilet t/f, unsteadiness noted but denied dizziness, able to enter & exit shower stall using GB w/ close SB/CGA  Toilet Transfers  Toilet - Technique: Ambulating  Equipment Used: Grab bars  Toilet Transfer: Stand by assistance  Toilet Transfers Comments: using GB  Shower Transfers  Shower - Transfer From: Walker  Shower - Transfer Type: To and From  Shower - Transfer To:  Shower seat with back  Shower - Technique: Ambulating  Shower Transfers: Contact Guard  Shower Transfers Comments: needed cues to transition hands to [employer] when enter/exiting shower stall     Transfers  Sit to stand: Stand by assistance  Stand to sit: Stand by assistance  Transfer Comments: mild swaying upon standing, used support of RW for t/f; has 1 episode of posterior LOB while standing in shower (min A to recover)                    Vision  Vision Comment: wears glasses, seems to have proprioception deficits, needs further vision assmt  Cognition  Following Commands: Follows one step commands with repetition; Follows one step commands with increased time  Memory: Decreased short term memory;Decreased recall of recent events;Decreased recall of biographical Information  Safety Judgement: Decreased awareness of need for assistance;Decreased awareness of need for safety  Problem Solving: Assistance required to correct errors made  Insights: Decreased awareness of deficits  Initiation: Requires cues for some  Sequencing: Requires cues for some  Cognition Comment: pt is significantly  [place] & limited eyesight, decreased safety awareness & insight noted                                         Plan   Plan  Times per week: 3-5  Times per day: Daily  Plan weeks: pt is being DC'ed to home w/ son providing 24 hr S/care, recommend Level 3 home care  Current Treatment Recommendations: Strengthening, Patient/Caregiver Education & Training, Equipment Evaluation, Education, & procurement, Balance Training, Functional Mobility Training, Cognitive Reorientation, Endurance Training, Safety Education & Training, Self-Care / ADL    AM-PAC Score        AM-PAC Inpatient Daily Activity Raw Score: 21 (11/19/20 1412)  AM-PAC Inpatient ADL T-Scale Score : 44.27 (11/19/20 1412)  ADL Inpatient CMS 0-100% Score: 32.79 (11/19/20 1412)  ADL Inpatient CMS G-Code Modifier : David Barahona (11/19/20 1412)    Goals  Short term goals  Time Frame for Short term goals: by 3-5 sessions pt will complete  Short term goal 1: Grooming tasks IND'ly  Short term goal 2: UB dressing w/ set up--goal met  Short term goal 3: LB dressing w/ SBA  Short term goal 4: toileting w/ MI  Short term goal 5: household t/f w/ close supervision using RW  Long term goals  Long term goal 1: address cognition/safety during ADLs & t/f--determine if safe to return home, address DME needs  Long term goal 2: provide UE HEP for strengthening to improve IND w/ ADLs  Patient Goals   Patient goals : \"get back to normal\"--not able to state specific goals, not oriented to situation       Therapy Time   Individual Concurrent Group Co-treatment   Time In 1300         Time Out 1400         Minutes 60         Timed Code Treatment Minutes: [address] [facility], [facility] [id number]

## 2020-11-19 NOTE — PROGRESS NOTES
Physical Therapy  Facility/Department: [address] MED SURG  Daily Treatment Note  NAME: Nav Gutierrez  : 1945  MRN: [id number]    Date of Service: 2020    Discharge Recommendations:  Home with Home health PT, 2-3 sessions per week, S Level 1, 24 hour supervision or assist, Patient would benefit from continued therapy after discharge   PT Equipment Recommendations  Equipment Needed: No  Other: Pt stated having a lot of equipment at home from son receiving the equipment when he had a stroke in the past and pt stated she would be able to use that. HOME HEALTH CARE: LEVEL 1 STANDARD   -Initial home health evaluation to occur within 24-48 hours, in patient home    -Home health agency to establish plan of care for patient over 60 day period    -Medication Reconciliation    -PCP Visit scheduled within seven days of discharge    -PT/OT to evaluate with goal of regaining prior level of functioning    -OT to evaluate if patient has [address] needs for personal care     Nav Gutierrez scored a 21/24 on the AM-PAC short mobility form. Current research shows that an AM-PAC score of 18 or greater is typically associated with a discharge to the patient's home setting. Based on the patient's AM-PAC score and their current functional mobility deficits, it is recommended that the patient have 2-3 sessions per week of Physical Therapy at d/c to increase the patient's independence. At this time, this patient demonstrates the endurance and safety to discharge home with home therapy and a follow up treatment frequency of 2-3x/wk. Please see assessment section for further patient specific details. If patient discharges prior to next session this note will serve as a discharge summary. Please see below for the latest assessment towards goals. Assessment   Body structures, Functions, Activity limitations: Decreased functional mobility ; Decreased balance;Decreased strength;Decreased safe awareness;Decreased endurance  Assessment: Per Sofia Alegre MD, \"The patient is a 52-year-old Caucasianfemale who was in her usual state of health at home when she states thatshe feels like she passed out and fell down. Apparently, the fall waswitnessed by her son. \" Pt lives in a one level home with son who is able to assist her if needed. One KIMBERLY and no rails. PTA pt was independent except with driving which son is able to drive pt if needed. Pt able to transfer sit<>stand with SBA. As long as RW not used, no cues needed. Able to amb approx 120' with SBA-CGA on level tile with several turns around obstacles. CGA for turns and when close to obstacles as pt tends to grab for objects for steadying while amb causing her to get close to objects and have to alter gait to avoid from bumping into them or tripping. No AD used at this time as pt exhibited fair balance without AD and no LOB occurred throughout amb. Pt continues to report that she does not use AD and will continue to not use one at home. Pt limited by being [place], increased time to process commands, decreased strength, balance, and activity tolerance. Pt would benefit from continued skilled therapy after D/C home with son to maximize functional mobility, independence pt experienced PTA, and decrease burden of care on son. Treatment Diagnosis: decreased functional mobility  Prognosis: Good  PT Education: General Safety;Gait Training;Plan of Care;Transfer Training;Functional Mobility Training  Barriers to Learning: [place]  REQUIRES PT FOLLOW UP: Yes  Activity Tolerance  Activity Tolerance: Patient Tolerated treatment well(limited by [facility])     Patient Diagnosis(es): The encounter diagnosis was Hypertensive encephalopathy. has a past medical history of Acute MI ([facility] [address] Eczema, [place] (hard of hearing), Hypercholesteremia, Hypertension, Pacemaker, Ulcerative colitis, and Wears hearing aid. has a past surgical history that includes Hysterectomy (1996);  Pacemaker insertion; Coronary angioplasty with stent; Ankle fracture surgery (7/26/11); rhinoplasty; Upper gastrointestinal endoscopy (N/A, 1/20/2020); and Colonoscopy (N/A, 1/20/2020). Restrictions  Restrictions/Precautions  Restrictions/Precautions: Fall Risk  Position Activity Restriction  Other position/activity restrictions: COLBY system, pacemaker, , teley monitor, general diet    Subjective   General  Chart Reviewed: Yes  Additional Pertinent Hx: Per Fabio Zhou MD, \"The patient is a 69-year-old Caucasianfemale who was in her usual state of health at home when she states thatshe feels like she passed out and fell down. Apparently, the fall waswitnessed by her son. The patient has got absolutely no recollection ofany events preceding the fall. No nausea or vomiting. No fevers orchills. At the time of my examination in the ER, the patient statesthat she is feeling much better and feels like she is back to [facility]. \"  Response To Previous Treatment: Patient with no complaints from previous session. Family / Caregiver Present: No  Referring Practitioner: Kaitlin Gee MD  Subjective  Subjective: Pt awake in chair and agreeable to PT treatment this AM. Denies pain at this time and is looking forward to going home. General Comment  Comments: Pt [place] and requested that people talk loud and slow. Orientation  Orientation  Overall Orientation Status: Impaired  Orientation Level: Oriented to person;Disoriented to place; Disoriented to time       Objective   Transfers  Sit to Stand: Stand by assistance  Stand to sit: Stand by assistance  Comment: Pt switched between using one UE and bilat UEs to push up from chair during sit to stand and reaches back with bilat UEs when going from stand to sit.   Ambulation  Ambulation?: Yes  More Ambulation?: No  Ambulation 1  Surface: level tile  Device: No Device  Assistance: Stand by assistance;Contact guard assistance  Quality of Gait: decreased stance time on RLE causing decreased stance time on LLE, slightly flexed posture, ER of RLE, decreased DF, occasionally reached for objects to assist with steadying  Gait Deviations: Decreased step length;Decreased step height;Decreased arm swing;Decreased head and trunk rotation;Staggers  Distance: approx 120' with several turns around obstacles  Comments: Pt was stable when amb a straight path and without AD, CGA required for turns due to slight unsteadiness and pt would reach for object to hold onto when turning. Would get really close to objects and have to alter gait to avoid instead of taking a path to avoid the obstacle.   Stairs/Curb  Stairs?: No        Exercises  Hamstring Sets: x10 bilat in standing with RW  Hip Flexion: x15 marches in standing with RW  Hip Extension/Leg Presses: x10 bilat in standing with RW  Other exercises  Other exercises?: Yes  Other exercises 1: x15 mini squats with RW  Other exercises 2: x15 heel raises in standing with RW  Other exercises 3: x5 sit<>stands to work on proper sequencing and hand placement                             AM-PAC Score  AM-PAC Inpatient Mobility Raw Score : 21 (11/19/20 1200)  AM-PAC Inpatient T-Scale Score : 50.25 (11/19/20 1200)  Mobility Inpatient CMS 0-100% Score: 28.97 (11/19/20 1200)  Mobility Inpatient CMS G-Code Modifier : CJ (11/19/20 1200)          Goals  Short term goals  Time Frame for Short term goals: during acute stay  Short term goal 1: bed mobility independently  Short term goal 2: ambulate household distances with supervision and without AD  Short term goal 3: transfer independently  Long term goals  Time Frame for Long term goals : STG=LTG  Patient Goals   Patient goals : go home with son    Plan    Plan  Times per week: 3-5  Plan weeks: during acute stay  Current Treatment Recommendations: Strengthening, Home Exercise Program, Safety Education & Training, Balance Training, Endurance Training, Patient/Caregiver Education & Training, Functional Mobility Training, Transfer Training, Gait Training, Stair training  Safety Devices  Type of devices: All fall risk precautions in place, Call light within reach, Chair alarm in place, Gait belt, Patient at risk for falls, Left in chair  Restraints  Initially in place: No     Therapy Time   Individual Concurrent Group Co-treatment   Time In 1040         Time Out 1100         Minutes LENY Sage  Therapist was present, directed the patient's care, made skilled judgement, and was responsible for assessment and treatment of the patient.          .Electronically signed by Laura Samuel PT on 11/19/20 at 12:30 PM EST

## 2020-11-19 NOTE — PROGRESS NOTES
CLINICAL PHARMACY NOTE: MEDS TO [address] [place] [address] Janette Select Patient?: No  Total # of Prescriptions Filled: 1   The following medications were delivered to the patient:  Discharge Medication List as of 11/19/2020  2:06 PM      START taking these medications    Details   vitamin B-12 1000 MCG tablet Take 1 tablet by mouth daily, Disp-30 tablet,R-0Normal         ·   ·   Total # of Interventions Completed: 0  Time Spent (min): 30    Additional Documentation:

## 2020-11-19 NOTE — PROCEDURES
[address] [place] [address]                          ELECTROENCEPHALOGRAM REPORT    PATIENT NAME: Dago Lomeli                      :        1945  MED REC NO:   [id number]                          ROOM:       85  ACCOUNT NO:   [de-identified]                           ADMIT DATE: 11/15/2020  PROVIDER:     Ashley De La Rosa DO    DATE OF EE2020    REFERRING PROVIDER:  Omar Augustine NP    BRIEF HISTORY AND NEUROLOGIC FINDINGS:  The patient is a 67-year-old  female being evaluated for reported altered mental status. MEDICATIONS:  The patient's centrally acting medications listed include  Keppra. EEG FINDINGS:  This is a 20-channel digital EEG performed utilizing  bipolar and referential montages. Wakefulness, drowsiness and stage II  sleep were obtained during the recording. During maximum wakefulness,  there was a moderate voltage, symmetric, fairly well-regulated and  reactive 9 Hz posterior background rhythm. The anterior background  consists of low voltage fast frequencies. Drowsiness is manifested by  slow lateral eye movements and attenuation of the waking background  rhythms. Stage II sleep was manifested by sleep spindles and  K-complexes. Photic stimulation was performed at various flash frequencies and did  not produce any significant posterior driving response though the  patient was notably asleep during photic stimulation. Hyperventilation  exercise was not performed due to the patient's age. A 1-channel EKG rhythm strip was reviewed and showed no significant  cardiac dysrhythmias. Significant myogenic artifact was present at times during the recording  which partially obscured the underlying background rhythms. This was  most notable in the left hemisphere, especially in the temporal  occipital regions.     EEG DIAGNOSIS:  This EEG is essentially normal in the awake and asleep  state. CLINICAL INTERPRETATION:  No definite epileptiform activity or evidence  for focal cerebral dysfunction was present during this recording. If  seizures remain a clinical concern, then a repeat EEG may be of further  benefit. Clinical correlation is advised.         Bing Dean DO    D: 11/18/2020 13:36:11       T: 11/18/2020 13:42:35     [id number]  Job#: [id number]     Doc#: [id number]    CC:

## 2020-11-19 NOTE — CARE COORDINATION
11/19/20 1055   Condition of Participation: Discharge Planning   The Plan for Transition of Care is related to the following treatment goals: Strengthening, Home Exercise Program, Safety Education & Training, Balance Training, Endurance Training, Patient/Caregiver Education & Training, Functional Mobility Training, Transfer Training, Gait Training, Stair training   The Patient and/or Patient Representative was provided with a Choice of Provider? Patient Representative   Name of the Patient Representative who was provided with the Choice of Provider and agrees with the Discharge Plan? Yolette Armstrong (Son)   The Patient and/Or Patient Representative agree with the Discharge Plan? Yes   Freedom of Choice list was provided with basic dialogue that supports the patient's individualized plan of care/goals, treatment preferences, and shares the quality data associated with the providers? Yes   Son chose [facility]. Referral placed. Electronically signed by John Toth RN on 11/19/2020 at 10:56 AM

## 2020-11-19 NOTE — PLAN OF CARE
Problem: Falls - Risk of:  Goal: Will remain free from falls  Description: Will remain free from falls  11/18/2020 2236 by Monisha Merritt RN  Outcome: Ongoing     Problem: Skin Integrity:  Goal: Will show no infection signs and symptoms  Description: Will show no infection signs and symptoms  11/18/2020 2236 by Monisha Merritt RN  Outcome: Ongoing     Problem: Skin Integrity:  Goal: Absence of new skin breakdown  Description: Absence of new skin breakdown  11/18/2020 2236 by Monisha Merritt RN  Outcome: Ongoing     Problem: Restraint Use - Nonviolent/Non-Self-Destructive Behavior:  Goal: Absence of restraint indications  Description: Absence of restraint indications  11/18/2020 2236 by Monisha Merritt RN  Outcome: Ongoing

## 2020-11-19 NOTE — DISCHARGE SUMMARY
Hospital Medicine Discharge Summary    Patient ID: Radha Santos      Patient's PCP: Yuli Sullivan Date: 11/15/2020     Discharge Date:   11/19/2020    Admitting Physician: Estelita Dominguez MD     Discharge Physician: Roberto Lang MD     Discharge Diagnoses: Active Hospital Problems    Diagnosis    Acute encephalopathy [G93.40]    Fall [W19. XXXA]    Acute metabolic encephalopathy [V42.73]       The patient was seen and examined on day of discharge and this discharge summary is in conjunction with any daily progress note from day of discharge. Hospital Course:     From HPI:\"The patient is a 81-year-old   female who was in her usual state of health at home when she states that  she feels like she passed out and fell down. Apparently, the fall was  witnessed by her son. The patient has got absolutely no recollection of  any events preceding the fall. No nausea or vomiting. No fevers or  chills. At the time of my examination in the ER, the patient states  that she is feeling much better and feels like she is back to her  Baseline. \"    1. Syncope and collapse, discussed with cardiology, unlikely cardiac etiology, tele monitoring. No further events  2. Acute metabolic encephalopathy, confusion cleared up, no clear etiology at this time, neurology consulted. TSH WNL, B12 borderline, added supplement.    3. Fall with head trauma, fall precautions, neurology consulted.   4. Hypertensive urgency on admission, BP better controlled now. 5. UC, no active issues.   6. CAD, no chest pain  7. Possible episode of afib on the device back on 08/14/2019 per cardiology, discussed with cardiology/Dr Vishnu Page to evaluate if she is a candidate for watchman with her high risk of stroke and bleeding at the same time. Follow up with Dr Vishnu Page as outpatient for further management, keep off [address] [place] [address] [email or web address] at this time per cardiology recommendations.  Plans and options discussed with her son yesterday, all questions answered. Physical Exam Performed:     BP (!) 146/80   Pulse 67   Temp 98.4 °F (36.9 °C) (Oral)   Resp 18   Ht 4' 10\" (1.473 m)   Wt 151 lb 3.8 oz (68.6 kg)   SpO2 95%   BMI 31.61 kg/m²       General appearance:  No apparent distress  HEENT:  Normal cephalic  Neck: Supple  Respiratory:  Normal respiratory effort. Clear to auscultation, bilaterally without Rales/Wheezes/Rhonchi. Cardiovascular:  Regular rate and rhythm with normal S1/S2 without murmurs, rubs or gallops. Abdomen: Soft, non-tender, non-distended  Musculoskeletal:  No clubbing, cyanosis   Skin: Skin color, texture, turgor normal.  No rashes or lesions. Neurologic:  No focal weakness   Psychiatric:  Alert and oriented  Capillary Refill: Brisk,< 3 seconds   Peripheral Pulses: +2 palpable, equal bilaterally       Labs: For convenience and continuity at follow-up the following most recent labs are provided:      CBC:    Lab Results   Component Value Date    WBC 8.2 11/15/2020    HGB 12.2 11/15/2020    HCT 38.7 11/15/2020     11/15/2020       Renal:    Lab Results   Component Value Date     11/15/2020    K 3.5 11/15/2020     11/15/2020    CO2 25 11/15/2020    BUN 12 11/15/2020    CREATININE 0.7 11/15/2020    CALCIUM 8.8 11/15/2020    PHOS 3.7 01/20/2020         Significant Diagnostic Studies    Radiology:   CT HEAD WO CONTRAST   Final Result   No acute intracranial abnormality. CT Head WO Contrast   Final Result   No acute intracranial abnormality. 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 Old lacunar infarct in right head of caudate nucleus, stable. Mild parenchymal volume loss. Mild to moderate chronic microvascular disease. XR CHEST PORTABLE   Final Result   Radiographically clear lungs. Probable hiatal hernia.                 Consults:     IP CONSULT TO HOSPITALIST  IP CONSULT TO NEUROLOGY  IP CONSULT TO CARDIOLOGY  IP CONSULT TO SOCIAL WORK  IP CONSULT TO HOME CARE NEEDS    Disposition:  home     Condition at Discharge: Stable    Discharge Instructions/Follow-up:  PCP, Cardiology     Code Status:  Full Code     Activity: activity as tolerated    Diet: regular diet      Discharge Medications:     Current Discharge Medication List           Details   vitamin B-12 1000 MCG tablet Take 1 tablet by mouth daily  Qty: 30 tablet, Refills: 0              Details   levETIRAcetam (KEPPRA) 250 MG tablet Take 1 tablet by mouth 2 times daily  Qty: 60 tablet, Refills: 3      balsalazide (COLAZAL) 750 MG capsule Take 3 capsules by mouth 3 times daily  Qty: 270 capsule, Refills: 0      pantoprazole (PROTONIX) 40 MG tablet Take 1 tablet by mouth 2 times daily (before meals)  Qty: 180 tablet, Refills: 1      lisinopril (PRINIVIL;ZESTRIL) 20 MG tablet Take 20 mg by mouth daily. clopidogrel (PLAVIX) 75 MG tablet Take 75 mg by mouth daily. metoprolol (TOPROL-XL) 25 MG XL tablet Take 25 mg by mouth daily. atorvastatin (LIPITOR) 40 MG tablet Take 40 mg by mouth daily. Time Spent on discharge is more than 1 hour in the examination, evaluation, counseling and review of medications and discharge plan. Signed:    Anjum Grarido MD   11/19/2020      Thank you Mau Dougherty for the opportunity to be involved in this patient's care. If you have any questions or concerns please feel free to contact me at [phone].

## 2020-11-19 NOTE — PROGRESS NOTES
Orders to discharge patient home with homecare. Removed IV access. Patient tolerated well. Gave prescription to patient and provided written lexicomp handouts on each prescription. Reviewed AVS summary with patient including home medications, new prescriptions, diet, activity, follow up appointments and when to call the doctor. Patient verbalized understanding of material. Patient in wheelchair for discharge to son (waiting just outside of the lobby in [facility]) who is taking patient home.
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
## 2021-04-17 ENCOUNTER — APPOINTMENT (OUTPATIENT)
Dept: CT IMAGING | Age: 76
DRG: 689 | End: 2021-04-17
Payer: MEDICARE

## 2021-04-17 ENCOUNTER — HOSPITAL ENCOUNTER (INPATIENT)
Age: 76
LOS: 3 days | Discharge: NURSING, REHAB OR LONG-TERM CARE | DRG: 689 | End: 2021-04-21
Attending: EMERGENCY MEDICINE | Admitting: FAMILY MEDICINE
Payer: MEDICARE

## 2021-04-17 DIAGNOSIS — N30.00 ACUTE CYSTITIS WITHOUT HEMATURIA: ICD-10-CM

## 2021-04-17 DIAGNOSIS — R41.82 ALTERED MENTAL STATUS, UNSPECIFIED ALTERED MENTAL STATUS TYPE: Primary | ICD-10-CM

## 2021-04-17 DIAGNOSIS — J18.9 PNEUMONIA DUE TO ORGANISM: ICD-10-CM

## 2021-04-17 PROCEDURE — 99285 EMERGENCY DEPT VISIT HI MDM: CPT

## 2021-04-17 PROCEDURE — 99284 EMERGENCY DEPT VISIT MOD MDM: CPT

## 2021-04-17 PROCEDURE — 81001 URINALYSIS AUTO W/SCOPE: CPT

## 2021-04-17 PROCEDURE — 87086 URINE CULTURE/COLONY COUNT: CPT

## 2021-04-17 PROCEDURE — 36415 COLL VENOUS BLD VENIPUNCTURE: CPT

## 2021-04-17 RX ORDER — 0.9 % SODIUM CHLORIDE 0.9 %
1000 INTRAVENOUS SOLUTION INTRAVENOUS ONCE
Status: COMPLETED | OUTPATIENT
Start: 2021-04-17 | End: 2021-04-18

## 2021-04-18 ENCOUNTER — APPOINTMENT (OUTPATIENT)
Dept: CT IMAGING | Age: 76
DRG: 689 | End: 2021-04-18
Payer: MEDICARE

## 2021-04-18 PROBLEM — Z20.822 SUSPECTED COVID-19 VIRUS INFECTION: Status: ACTIVE | Noted: 2021-04-18

## 2021-04-18 PROBLEM — R29.6 FALLS FREQUENTLY: Status: ACTIVE | Noted: 2021-04-18

## 2021-04-18 PROBLEM — N39.0 UTI (URINARY TRACT INFECTION): Status: ACTIVE | Noted: 2021-04-18

## 2021-04-18 LAB
A/G RATIO: 0.9 (ref 1.1–2.2)
ALBUMIN SERPL-MCNC: 3.4 G/DL (ref 3.4–5)
ALBUMIN SERPL-MCNC: 3.9 G/DL (ref 3.4–5)
ALP BLD-CCNC: 84 U/L (ref 40–129)
ALP BLD-CCNC: 91 U/L (ref 40–129)
ALT SERPL-CCNC: 7 U/L (ref 10–40)
ALT SERPL-CCNC: 8 U/L (ref 10–40)
AMMONIA: 12 UMOL/L (ref 11–51)
ANION GAP SERPL CALCULATED.3IONS-SCNC: 10 MMOL/L (ref 3–16)
ANION GAP SERPL CALCULATED.3IONS-SCNC: 13 MMOL/L (ref 3–16)
AST SERPL-CCNC: 13 U/L (ref 15–37)
AST SERPL-CCNC: 14 U/L (ref 15–37)
BACTERIA: ABNORMAL /HPF
BASOPHILS ABSOLUTE: 0 K/UL (ref 0–0.2)
BASOPHILS ABSOLUTE: 0 K/UL (ref 0–0.2)
BASOPHILS RELATIVE PERCENT: 0.2 %
BASOPHILS RELATIVE PERCENT: 0.3 %
BILIRUB SERPL-MCNC: 0.3 MG/DL (ref 0–1)
BILIRUB SERPL-MCNC: 0.4 MG/DL (ref 0–1)
BILIRUBIN DIRECT: <0.2 MG/DL (ref 0–0.3)
BILIRUBIN URINE: NEGATIVE
BILIRUBIN, INDIRECT: ABNORMAL MG/DL (ref 0–1)
BLOOD, URINE: ABNORMAL
BUN BLDV-MCNC: 13 MG/DL (ref 7–20)
BUN BLDV-MCNC: 17 MG/DL (ref 7–20)
CALCIUM SERPL-MCNC: 7.8 MG/DL (ref 8.3–10.6)
CALCIUM SERPL-MCNC: 8.4 MG/DL (ref 8.3–10.6)
CHLORIDE BLD-SCNC: 102 MMOL/L (ref 99–110)
CHLORIDE BLD-SCNC: 105 MMOL/L (ref 99–110)
CLARITY: CLEAR
CO2: 22 MMOL/L (ref 21–32)
CO2: 23 MMOL/L (ref 21–32)
COLOR: YELLOW
CREAT SERPL-MCNC: 0.7 MG/DL (ref 0.6–1.2)
CREAT SERPL-MCNC: 0.8 MG/DL (ref 0.6–1.2)
EOSINOPHILS ABSOLUTE: 0.1 K/UL (ref 0–0.6)
EOSINOPHILS ABSOLUTE: 0.1 K/UL (ref 0–0.6)
EOSINOPHILS RELATIVE PERCENT: 1.1 %
EOSINOPHILS RELATIVE PERCENT: 1.2 %
EPITHELIAL CELLS, UA: 0 /HPF (ref 0–5)
GFR AFRICAN AMERICAN: >60
GFR AFRICAN AMERICAN: >60
GFR NON-AFRICAN AMERICAN: >60
GFR NON-AFRICAN AMERICAN: >60
GLOBULIN: 3.6 G/DL
GLUCOSE BLD-MCNC: 120 MG/DL (ref 70–99)
GLUCOSE BLD-MCNC: 89 MG/DL (ref 70–99)
GLUCOSE URINE: NEGATIVE MG/DL
HCT VFR BLD CALC: 32.9 % (ref 36–48)
HCT VFR BLD CALC: 35.5 % (ref 36–48)
HEMATOLOGY PATH CONSULT: NO
HEMATOLOGY PATH CONSULT: NO
HEMOGLOBIN: 10.5 G/DL (ref 12–16)
HEMOGLOBIN: 11.1 G/DL (ref 12–16)
HYALINE CASTS: 3 /LPF (ref 0–8)
KEPPRA DOSE AMT: ABNORMAL
KEPPRA: <2 UG/ML (ref 6–46)
KETONES, URINE: 15 MG/DL
LACTIC ACID: 1.7 MMOL/L (ref 0.4–2)
LEUKOCYTE ESTERASE, URINE: ABNORMAL
LIPASE: 24 U/L (ref 13–60)
LYMPHOCYTES ABSOLUTE: 0.8 K/UL (ref 1–5.1)
LYMPHOCYTES ABSOLUTE: 1.2 K/UL (ref 1–5.1)
LYMPHOCYTES RELATIVE PERCENT: 19.5 %
LYMPHOCYTES RELATIVE PERCENT: 9 %
MAGNESIUM: 1.9 MG/DL (ref 1.8–2.4)
MCH RBC QN AUTO: 19.5 PG (ref 26–34)
MCH RBC QN AUTO: 19.7 PG (ref 26–34)
MCHC RBC AUTO-ENTMCNC: 31.3 G/DL (ref 31–36)
MCHC RBC AUTO-ENTMCNC: 32 G/DL (ref 31–36)
MCV RBC AUTO: 61.6 FL (ref 80–100)
MCV RBC AUTO: 62.1 FL (ref 80–100)
MICROSCOPIC EXAMINATION: YES
MONOCYTES ABSOLUTE: 0.4 K/UL (ref 0–1.3)
MONOCYTES ABSOLUTE: 0.5 K/UL (ref 0–1.3)
MONOCYTES RELATIVE PERCENT: 4.9 %
MONOCYTES RELATIVE PERCENT: 8.9 %
NEUTROPHILS ABSOLUTE: 4.2 K/UL (ref 1.7–7.7)
NEUTROPHILS ABSOLUTE: 7.1 K/UL (ref 1.7–7.7)
NEUTROPHILS RELATIVE PERCENT: 70.1 %
NEUTROPHILS RELATIVE PERCENT: 84.8 %
NITRITE, URINE: NEGATIVE
PDW BLD-RTO: 19.3 % (ref 12.4–15.4)
PDW BLD-RTO: 19.6 % (ref 12.4–15.4)
PH UA: 5.5 (ref 5–8)
PLATELET # BLD: 294 K/UL (ref 135–450)
PLATELET # BLD: 323 K/UL (ref 135–450)
PMV BLD AUTO: 8.1 FL (ref 5–10.5)
PMV BLD AUTO: 8.3 FL (ref 5–10.5)
POTASSIUM REFLEX MAGNESIUM: 3.5 MMOL/L (ref 3.5–5.1)
POTASSIUM REFLEX MAGNESIUM: 3.9 MMOL/L (ref 3.5–5.1)
PRO-BNP: 200 PG/ML (ref 0–449)
PROTEIN UA: NEGATIVE MG/DL
RBC # BLD: 5.34 M/UL (ref 4–5.2)
RBC # BLD: 5.72 M/UL (ref 4–5.2)
RBC UA: 4 /HPF (ref 0–4)
SARS-COV-2, NAAT: NOT DETECTED
SODIUM BLD-SCNC: 135 MMOL/L (ref 136–145)
SODIUM BLD-SCNC: 140 MMOL/L (ref 136–145)
SPECIFIC GRAVITY UA: 1.02 (ref 1–1.03)
TOTAL CK: 127 U/L (ref 26–192)
TOTAL PROTEIN: 7 G/DL (ref 6.4–8.2)
TOTAL PROTEIN: 7.9 G/DL (ref 6.4–8.2)
TROPONIN: <0.01 NG/ML
TSH REFLEX: 1.75 UIU/ML (ref 0.27–4.2)
URINE REFLEX TO CULTURE: YES
URINE TYPE: ABNORMAL
UROBILINOGEN, URINE: 0.2 E.U./DL
WBC # BLD: 6 K/UL (ref 4–11)
WBC # BLD: 8.4 K/UL (ref 4–11)
WBC UA: 23 /HPF (ref 0–5)

## 2021-04-18 PROCEDURE — 80048 BASIC METABOLIC PNL TOTAL CA: CPT

## 2021-04-18 PROCEDURE — 6360000002 HC RX W HCPCS: Performed by: EMERGENCY MEDICINE

## 2021-04-18 PROCEDURE — 74176 CT ABD & PELVIS W/O CONTRAST: CPT

## 2021-04-18 PROCEDURE — 83605 ASSAY OF LACTIC ACID: CPT

## 2021-04-18 PROCEDURE — 2580000003 HC RX 258: Performed by: NURSE PRACTITIONER

## 2021-04-18 PROCEDURE — 82550 ASSAY OF CK (CPK): CPT

## 2021-04-18 PROCEDURE — 82140 ASSAY OF AMMONIA: CPT

## 2021-04-18 PROCEDURE — 84443 ASSAY THYROID STIM HORMONE: CPT

## 2021-04-18 PROCEDURE — 94760 N-INVAS EAR/PLS OXIMETRY 1: CPT

## 2021-04-18 PROCEDURE — 96365 THER/PROPH/DIAG IV INF INIT: CPT

## 2021-04-18 PROCEDURE — 80076 HEPATIC FUNCTION PANEL: CPT

## 2021-04-18 PROCEDURE — 84484 ASSAY OF TROPONIN QUANT: CPT

## 2021-04-18 PROCEDURE — 6370000000 HC RX 637 (ALT 250 FOR IP): Performed by: NURSE PRACTITIONER

## 2021-04-18 PROCEDURE — 83880 ASSAY OF NATRIURETIC PEPTIDE: CPT

## 2021-04-18 PROCEDURE — 87635 SARS-COV-2 COVID-19 AMP PRB: CPT

## 2021-04-18 PROCEDURE — 2580000003 HC RX 258: Performed by: EMERGENCY MEDICINE

## 2021-04-18 PROCEDURE — 83690 ASSAY OF LIPASE: CPT

## 2021-04-18 PROCEDURE — 36415 COLL VENOUS BLD VENIPUNCTURE: CPT

## 2021-04-18 PROCEDURE — 6360000002 HC RX W HCPCS: Performed by: NURSE PRACTITIONER

## 2021-04-18 PROCEDURE — 80177 DRUG SCRN QUAN LEVETIRACETAM: CPT

## 2021-04-18 PROCEDURE — 70450 CT HEAD/BRAIN W/O DYE: CPT

## 2021-04-18 PROCEDURE — 85025 COMPLETE CBC W/AUTO DIFF WBC: CPT

## 2021-04-18 PROCEDURE — 1200000000 HC SEMI PRIVATE

## 2021-04-18 PROCEDURE — 80053 COMPREHEN METABOLIC PANEL: CPT

## 2021-04-18 PROCEDURE — 83735 ASSAY OF MAGNESIUM: CPT

## 2021-04-18 RX ORDER — ONDANSETRON 2 MG/ML
4 INJECTION INTRAMUSCULAR; INTRAVENOUS EVERY 6 HOURS PRN
Status: DISCONTINUED | OUTPATIENT
Start: 2021-04-18 | End: 2021-04-21 | Stop reason: HOSPADM

## 2021-04-18 RX ORDER — ACETAMINOPHEN 325 MG/1
650 TABLET ORAL EVERY 6 HOURS PRN
Status: DISCONTINUED | OUTPATIENT
Start: 2021-04-18 | End: 2021-04-21 | Stop reason: HOSPADM

## 2021-04-18 RX ORDER — ATORVASTATIN CALCIUM 40 MG/1
40 TABLET, FILM COATED ORAL DAILY
Status: DISCONTINUED | OUTPATIENT
Start: 2021-04-18 | End: 2021-04-21 | Stop reason: HOSPADM

## 2021-04-18 RX ORDER — PANTOPRAZOLE SODIUM 40 MG/1
40 TABLET, DELAYED RELEASE ORAL
Status: DISCONTINUED | OUTPATIENT
Start: 2021-04-18 | End: 2021-04-21 | Stop reason: HOSPADM

## 2021-04-18 RX ORDER — POLYETHYLENE GLYCOL 3350 17 G/17G
17 POWDER, FOR SOLUTION ORAL DAILY PRN
Status: DISCONTINUED | OUTPATIENT
Start: 2021-04-18 | End: 2021-04-21 | Stop reason: HOSPADM

## 2021-04-18 RX ORDER — LEVETIRACETAM 500 MG/1
250 TABLET ORAL 2 TIMES DAILY
Status: DISCONTINUED | OUTPATIENT
Start: 2021-04-18 | End: 2021-04-21 | Stop reason: HOSPADM

## 2021-04-18 RX ORDER — METOPROLOL SUCCINATE 25 MG/1
25 TABLET, EXTENDED RELEASE ORAL DAILY
Status: DISCONTINUED | OUTPATIENT
Start: 2021-04-18 | End: 2021-04-21 | Stop reason: HOSPADM

## 2021-04-18 RX ORDER — SODIUM CHLORIDE 9 MG/ML
25 INJECTION, SOLUTION INTRAVENOUS PRN
Status: DISCONTINUED | OUTPATIENT
Start: 2021-04-18 | End: 2021-04-21 | Stop reason: HOSPADM

## 2021-04-18 RX ORDER — BALSALAZIDE DISODIUM 750 MG/1
2250 CAPSULE ORAL 3 TIMES DAILY
Status: DISCONTINUED | OUTPATIENT
Start: 2021-04-18 | End: 2021-04-21 | Stop reason: HOSPADM

## 2021-04-18 RX ORDER — PROMETHAZINE HYDROCHLORIDE 25 MG/1
12.5 TABLET ORAL EVERY 6 HOURS PRN
Status: DISCONTINUED | OUTPATIENT
Start: 2021-04-18 | End: 2021-04-21 | Stop reason: HOSPADM

## 2021-04-18 RX ORDER — CLOPIDOGREL BISULFATE 75 MG/1
75 TABLET ORAL DAILY
Status: DISCONTINUED | OUTPATIENT
Start: 2021-04-18 | End: 2021-04-21 | Stop reason: HOSPADM

## 2021-04-18 RX ORDER — ACETAMINOPHEN 650 MG/1
650 SUPPOSITORY RECTAL EVERY 6 HOURS PRN
Status: DISCONTINUED | OUTPATIENT
Start: 2021-04-18 | End: 2021-04-21 | Stop reason: HOSPADM

## 2021-04-18 RX ORDER — SODIUM CHLORIDE 0.9 % (FLUSH) 0.9 %
5-40 SYRINGE (ML) INJECTION PRN
Status: DISCONTINUED | OUTPATIENT
Start: 2021-04-18 | End: 2021-04-21 | Stop reason: HOSPADM

## 2021-04-18 RX ORDER — CEFUROXIME AXETIL 250 MG/1
500 TABLET ORAL EVERY 12 HOURS SCHEDULED
Status: DISCONTINUED | OUTPATIENT
Start: 2021-04-18 | End: 2021-04-19

## 2021-04-18 RX ORDER — SODIUM CHLORIDE 0.9 % (FLUSH) 0.9 %
5-40 SYRINGE (ML) INJECTION EVERY 12 HOURS SCHEDULED
Status: DISCONTINUED | OUTPATIENT
Start: 2021-04-18 | End: 2021-04-21 | Stop reason: HOSPADM

## 2021-04-18 RX ORDER — CEFUROXIME AXETIL 250 MG/1
250 TABLET ORAL DAILY
Status: DISCONTINUED | OUTPATIENT
Start: 2021-04-18 | End: 2021-04-18 | Stop reason: DRUGHIGH

## 2021-04-18 RX ORDER — LISINOPRIL 20 MG/1
20 TABLET ORAL DAILY
Status: DISCONTINUED | OUTPATIENT
Start: 2021-04-18 | End: 2021-04-21 | Stop reason: HOSPADM

## 2021-04-18 RX ADMIN — SODIUM CHLORIDE, PRESERVATIVE FREE 10 ML: 5 INJECTION INTRAVENOUS at 12:13

## 2021-04-18 RX ADMIN — LISINOPRIL 20 MG: 20 TABLET ORAL at 12:11

## 2021-04-18 RX ADMIN — SODIUM CHLORIDE, PRESERVATIVE FREE 10 ML: 5 INJECTION INTRAVENOUS at 20:50

## 2021-04-18 RX ADMIN — LEVETIRACETAM 250 MG: 500 TABLET ORAL at 20:50

## 2021-04-18 RX ADMIN — SODIUM CHLORIDE 1000 ML: 9 INJECTION, SOLUTION INTRAVENOUS at 00:24

## 2021-04-18 RX ADMIN — CEFTRIAXONE 1000 MG: 1 INJECTION, POWDER, FOR SOLUTION INTRAMUSCULAR; INTRAVENOUS at 01:00

## 2021-04-18 RX ADMIN — AZITHROMYCIN MONOHYDRATE 500 MG: 500 INJECTION, POWDER, LYOPHILIZED, FOR SOLUTION INTRAVENOUS at 01:53

## 2021-04-18 RX ADMIN — ENOXAPARIN SODIUM 40 MG: 40 INJECTION SUBCUTANEOUS at 12:10

## 2021-04-18 RX ADMIN — LEVETIRACETAM 250 MG: 500 TABLET ORAL at 12:11

## 2021-04-18 RX ADMIN — ATORVASTATIN CALCIUM 40 MG: 40 TABLET, FILM COATED ORAL at 12:11

## 2021-04-18 RX ADMIN — CLOPIDOGREL BISULFATE 75 MG: 75 TABLET ORAL at 12:11

## 2021-04-18 RX ADMIN — PANTOPRAZOLE SODIUM 40 MG: 40 TABLET, DELAYED RELEASE ORAL at 16:10

## 2021-04-18 RX ADMIN — METOPROLOL SUCCINATE 25 MG: 25 TABLET, EXTENDED RELEASE ORAL at 12:11

## 2021-04-18 RX ADMIN — CEFUROXIME AXETIL 500 MG: 250 TABLET ORAL at 20:50

## 2021-04-18 NOTE — ED PROVIDER NOTES
EMERGENCY DEPARTMENT ENCOUNTER      Pt Name: Dane Aguilar  MRN: [id number]  [place] 1945  Date of evaluation: 4/17/2021  Provider: Azul Ya MD    CHIEF COMPLAINT       Chief Complaint   Patient presents with    Altered Mental Status    Diarrhea       HISTORY OF PRESENT ILLNESS    Dane Aguilar is a 77 y.o. female who presents to the emergency department with altered mental status, diarrhea. EMS arrived and patient was on the ground. There was concern for worsening confusion. She already has baseline dementia. Patient was brought to the emergency room. History otherwise limited secondary to above. Nursing Notes were reviewed. Including nursing noted for FM, Surgical History, Past Medical History, Social History, vitals, and allergies; agree with all.      REVIEW OF SYSTEMS       Review of Systems   Unable to perform ROS: Mental status change     PAST MEDICAL HISTORY     Past Medical History:   Diagnosis Date    Acute MI ([address] 2003    Eczema     [place] (hard of hearing)     Hypercholesteremia     Hypertension     Pacemaker     Ulcerative colitis 2003    \"resolved\" per pt    Wears hearing aid        SURGICAL HISTORY       Past Surgical History:   Procedure Laterality Date    ANKLE FRACTURE SURGERY  7/26/11    ORIF left ankle fracture    COLONOSCOPY N/A 1/20/2020    COLONOSCOPY WITH BIOPSY performed by Caitlin Albarado MD at [facility] 19      x5   [facility] GASTROINTESTINAL ENDOSCOPY N/A 1/20/2020    EGD BIOPSY performed by Caitlin Albarado MD at [facility] 189       Current Discharge Medication List      CONTINUE these medications which have NOT CHANGED    Details   vitamin B-12 1000 MCG tablet Take 1 tablet by mouth daily  Qty: 30 tablet, Refills: 0      levETIRAcetam (KEPPRA) 250 MG tablet Take 1 tablet by mouth 2 times daily  Qty: 60 tablet, Refills: 3      balsalazide (COLAZAL) 750 MG capsule Take 3 capsules by mouth 3 times daily  Qty: 270 capsule, Refills: 0      pantoprazole (PROTONIX) 40 MG tablet Take 1 tablet by mouth 2 times daily (before meals)  Qty: 180 tablet, Refills: 1      lisinopril (PRINIVIL;ZESTRIL) 20 MG tablet Take 20 mg by mouth daily. clopidogrel (PLAVIX) 75 MG tablet Take 75 mg by mouth daily. metoprolol (TOPROL-XL) 25 MG XL tablet Take 25 mg by mouth daily. atorvastatin (LIPITOR) 40 MG tablet Take 40 mg by mouth daily.              ALLERGIES     Cortisone and Percocet [oxycodone-acetaminophen]    FAMILY HISTORY        Family History   Problem Relation Age of Onset    Heart Disease Mother     Ulcerative Colitis Father     Ulcerative Colitis Son        SOCIAL HISTORY       Social History     Socioeconomic History    Marital status: Single     Spouse name: None    Number of children: None    Years of education: None    Highest education level: None   Occupational History    Occupation: retired       Comment: [address] [employer]   Social Needs    Financial resource strain: None    Food insecurity     Worry: None     Inability: None    Transportation needs     Medical: None     Non-medical: None   Tobacco Use    Smoking status: Never Smoker    Smokeless tobacco: Never Used   Substance and Sexual Activity    Alcohol use: No     Alcohol/week: 0.0 standard drinks    Drug use: No    Sexual activity: Not Currently   Lifestyle    Physical activity     Days per week: None     Minutes per session: None    Stress: None   Relationships    Social connections     Talks on phone: None     Gets together: None     Attends Anabaptism service: None     Active member of club or organization: None     Attends meetings of clubs or organizations: None     Relationship status: None    Intimate partner violence     Fear of current or ex partner: None     Emotionally abused: None     Physically abused: None Forced sexual activity: None   Other Topics Concern    None   Social History Narrative    None       PHYSICAL EXAM       ED Triage Vitals   BP Temp Temp Source Pulse Resp SpO2 Height Weight   04/17/21 2344 04/18/21 0116 04/18/21 0116 04/17/21 2344 04/17/21 2344 04/17/21 2344 -- --   (!) 171/98 100.2 °F (37.9 °C) Rectal 90 19 92 %         Physical Exam  Vitals signs and nursing note reviewed. Constitutional:       General: She is not in acute distress. Appearance: She is well-developed. She is ill-appearing. She is not toxic-appearing or diaphoretic. HENT:      Head: Normocephalic and atraumatic. Right Ear: External ear normal.      Left Ear: External ear normal.   Eyes:      General:         Right eye: No discharge. Left eye: No discharge. Conjunctiva/sclera: Conjunctivae normal.      Pupils: Pupils are equal, round, and reactive to light. Neck:      Musculoskeletal: Normal range of motion and neck supple. Cardiovascular:      Rate and Rhythm: Normal rate and regular rhythm. Heart sounds: No murmur. Pulmonary:      Effort: Pulmonary effort is normal. No respiratory distress. Breath sounds: Normal breath sounds. No wheezing or rales. Abdominal:      General: Bowel sounds are normal. There is no distension. Palpations: Abdomen is soft. There is no mass. Tenderness: There is no abdominal tenderness. There is no guarding or rebound. Genitourinary:     Comments: Deferred  Musculoskeletal: Normal range of motion. General: No deformity. Skin:     General: Skin is warm. Findings: No erythema or rash. Neurological:      Mental Status: She is alert. She is disoriented. Motor: No atrophy or abnormal muscle tone. Comments: Moves all extremities   Psychiatric:         Behavior: Behavior normal.         Thought Content:  Thought content normal.         DIAGNOSTIC RESULTS     RADIOLOGY:   Non-plain film images such as CT, Ultrasoundand MRI are read by the radiologist. Felisa Russell radiographic images are visualized and preliminarily interpreted by the emergency physician with the below findings:    Impression   Hazy ground-glass opacities scattered in both lungs which could represent   early infiltrates from pneumonia vs atelectasis or scarring.  This pattern of   infiltrates has been described with COVID-19 disease and recommend   correlating with lab values and respiratory precautions.       Left pacemaker in good position.       Mildly dilated and atherosclerotic thoracic aorta with no aneurysm.       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       Moderate hiatal hernia with no mediastinal mass or adenopathy       No acute intra-abdominal abnormality.       Probable small perihilar cysts left kidney with no hydronephrosis or renal   stones.  Suggest ultrasound follow-up       Status post hysterectomy with no pelvic mass.       Scattered diverticula along the sigmoid colon with no pericolonic inflammation     ED BEDSIDE ULTRASOUND:   Performed by ED Physician - none    LABS:  Labs Reviewed   CBC WITH AUTO DIFFERENTIAL - Abnormal; Notable for the following components:       Result Value    RBC 5.72 (*)     Hemoglobin 11.1 (*)     Hematocrit 35.5 (*)     MCV 62.1 (*)     MCH 19.5 (*)     RDW 19.3 (*)     Lymphocytes Absolute 0.8 (*)     All other components within normal limits    Narrative:     Performed at:  [facility]  [address] [facility] [address]   Phone [phone]   BASIC METABOLIC PANEL W/ REFLEX TO MG FOR LOW K - Abnormal; Notable for the following components:    Sodium 135 (*)     Glucose 120 (*)     All other components within normal limits    Narrative:     Performed at:  [facility]  [address] [employer] [address]   Phone [phone]   HEPATIC FUNCTION PANEL - Abnormal; Notable for the following components:    ALT 8 (*)     AST 14 (*)     All other components within normal limits    Narrative:     Performed at:  [facility]  [address] [facility] [address]   Phone [phone]   URINE RT REFLEX TO CULTURE - Abnormal; Notable for the following components:    Ketones, Urine 15 (*)     Blood, Urine SMALL (*)     Leukocyte Esterase, Urine TRACE (*)     All other components within normal limits    Narrative:     Performed at:  [facility]  [address] [employer] [address]   Phone [phone]   MICROSCOPIC URINALYSIS - Abnormal; Notable for the following components:    Bacteria, UA 1+ (*)     WBC, UA 23 (*)     All other components within normal limits    Narrative:     Performed at:  [facility]  [address] [facility] [employer] [address]   Phone [phone], RAPID    Narrative:     Performed at:  [facility]  [address], [facility] [employer] [address]   Phone [phone]   C DIFF TOXIN/ANTIGEN   CULTURE, URINE   LIPASE    Narrative:     Performed at:  [facility]  [address] [facility] [employer] [address]   Phone [phone]   LACTIC ACID, PLASMA    Narrative:     Performed at:  [facility]  [address], [facility] [employer] [address]   Phone [phone]   AMMONIA    Narrative:     Performed at:  [facility]  [address], [place] [address]   Phone [phone]   TSH WITH REFLEX    Narrative:     Performed at:  [facility]  [address], [facility] [place] [address]   Phone [phone]   TROPONIN    Narrative:     Performed at:  [facility]  [address], [facility] [place] [address]   Phone [phone]   BRAIN NATRIURETIC PEPTIDE    Narrative:     Performed at:  [address] [facility]  [address] [place], [place] [address]   Phone [phone]   CK    Narrative:     Performed at:  [facility]  [address] [place], [place] [address]   Phone [phone]   LEVETIRACETAM LEVEL    Narrative:     Performed at:  [facility]  [address] [place], [place] [address]   Phone [phone]   COVID-19   COMPREHENSIVE METABOLIC PANEL W/ REFLEX TO MG FOR LOW K   CBC WITH AUTO DIFFERENTIAL   TROPONIN   TROPONIN       All other labs were withinnormal range or not returned as of this dictation. EMERGENCY DEPARTMENT COURSE and DIFFERENTIAL DIAGNOSIS/MDM:     PMH, Surgical Hx, FH, Social Hx reviewed by myself (ETOH usage, Tobacco usage, Drug usage reviewed by myself, no pertinent Hx)- No Pertinent Hx     Old records were reviewed by me     77 yr female Hx dementia but usually alert and oriented 2-3 with multiple falls, AMS. Covered in stool on arrival. Not knowing her name year acutely confused. She has UTI and possible PNA. Possible COVID. Abx started. Admission UTI, PNA, AMS. CRITICAL CARE TIME   Total Critical Caretime was 39 minutes, excluding separately reportable procedures. There was a high probability of clinically significant/life threatening deterioration in the patient's condition which required my urgent intervention. PROCEDURES:  Unlessotherwise noted below, none    FINAL IMPRESSION      1. Altered mental status, unspecified altered mental status type    2. Acute cystitis without hematuria    3. Pneumonia due to organism          DISPOSITION/PLAN   DISPOSITION Admitted 04/18/2021 02:06:34 AM    PATIENT REFERRED TO:  No follow-up provider specified.     DISCHARGE MEDICATIONS:  Current Discharge Medication List             (Please note that portions ofthis note were completed with a voice recognition program.  Efforts were made to edit the dictations but occasionally words are

## 2021-04-18 NOTE — H&P
medications    Medication Sig Start Date End Date Taking? Authorizing Provider   vitamin B-12 1000 MCG tablet Take 1 tablet by mouth daily 11/19/20   Aleksandar Carpio MD   levETIRAcetam (KEPPRA) 250 MG tablet Take 1 tablet by mouth 2 times daily 1/25/20   ROBERT Izaguirre CNP   balsalazide (COLAZAL) 750 MG capsule Take 3 capsules by mouth 3 times daily 1/25/20 2/24/20  ROBERT Izaguirre CNP   pantoprazole (PROTONIX) 40 MG tablet Take 1 tablet by mouth 2 times daily (before meals) 1/25/20   ROBERT Izaguirre CNP   lisinopril (PRINIVIL;ZESTRIL) 20 MG tablet Take 20 mg by mouth daily. Historical Provider, MD   clopidogrel (PLAVIX) 75 MG tablet Take 75 mg by mouth daily. Historical Provider, MD   metoprolol (TOPROL-XL) 25 MG XL tablet Take 25 mg by mouth daily. Historical Provider, MD   atorvastatin (LIPITOR) 40 MG tablet Take 40 mg by mouth daily. Historical Provider, MD       Allergies:  Cortisone and Percocet [oxycodone-acetaminophen]    Social History:      The patient currently lives at home by herself. TOBACCO:   reports that she has never smoked. She has never used smokeless tobacco.  ETOH:   reports no history of alcohol use. Family History:      Reviewed in detail positive as follows:        Problem Relation Age of Onset    Heart Disease Mother     Ulcerative Colitis Father     Ulcerative Colitis Son        REVIEW OF SYSTEMS:   Pertinent positives as noted in the HPI. All other systems reviewed and negative. PHYSICAL EXAM PERFORMED:    BP (!) 166/91   Pulse 93   Temp 100.2 °F (37.9 °C) (Rectal)   Resp 19   SpO2 99%     General appearance:  No apparent distress, appears stated age and cooperative. HEENT:  Normal cephalic, atraumatic without obvious deformity. Pupils equal, round, and reactive to light. Extra ocular muscles intact. Conjunctivae/corneas clear. Neck: Supple, with full range of motion. No jugular venous distention. Trachea midline.   Respiratory: Normal respiratory effort. Clear to auscultation, bilaterally without Rales/Wheezes/Rhonchi. Cardiovascular:  Regular rate and rhythm without murmurs, rubs or gallops. Abdomen: Soft, non-tender, non-distended, without rebound or guarding. Normal bowel sounds. Musculoskeletal:  No clubbing, cyanosis or edema bilaterally. Full range of motion without deformity. Skin: Skin color, texture, turgor normal.  No rashes or lesions. Neurologic:  Neurovascularly intact without any focal sensory/motor deficits. Cranial nerves: II-XII intact, grossly non-focal.  Psychiatric:  Alert and oriented to self, date of birth and age. She knows she is at the hospital.  She is able to tell me the year but cannot tell me the month. She cannot recall what happened today. Capillary Refill: Brisk,< 3 seconds   Peripheral Pulses: +2 palpable, equal bilaterally       Labs:     Recent Labs     04/18/21  0016   WBC 8.4   HGB 11.1*   HCT 35.5*        Recent Labs     04/18/21  0016   *   K 3.9      CO2 23   BUN 17   CREATININE 0.8   CALCIUM 8.4     Recent Labs     04/18/21  0016   AST 14*   ALT 8*   BILIDIR <0.2   BILITOT 0.4   ALKPHOS 91     No results for input(s): INR in the last 72 hours. Recent Labs     04/18/21  0016   CKTOTAL 127   TROPONINI <0.01       Urinalysis:      Lab Results   Component Value Date    NITRU Negative 04/18/2021    WBCUA 23 04/18/2021    BACTERIA 1+ 04/18/2021    RBCUA 4 04/18/2021    BLOODU SMALL 04/18/2021    SPECGRAV 1.022 04/18/2021    GLUCOSEU Negative 04/18/2021    GLUCOSEU NEGATIVE 11/02/2010       Radiology:     CT CHEST ABDOMEN PELVIS WO CONTRAST   Final Result   Hazy ground-glass opacities scattered in both lungs which could represent   early infiltrates from pneumonia vs atelectasis or scarring. This pattern of   infiltrates has been described with COVID-19 disease and recommend   correlating with lab values and respiratory precautions. Left pacemaker in good position. Mildly dilated and atherosclerotic thoracic aorta with no aneurysm. 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 Moderate hiatal hernia with no mediastinal mass or adenopathy      No acute intra-abdominal abnormality. Probable small perihilar cysts left kidney with no hydronephrosis or renal   stones. Suggest ultrasound follow-up      Status post hysterectomy with no pelvic mass. Scattered diverticula along the sigmoid colon with no pericolonic inflammation         CT HEAD WO CONTRAST   Final Result   1. No acute intracranial abnormality. 2. Moderate cerebral white matter chronic microvascular ischemic disease. 3. Right parietal temporal lobe remote infarction. 4. Bilateral basal ganglia multifocal remote lacunar infarcts, as discussed   above. 5. 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 ASSESSMENT:    Active Hospital Problems    Diagnosis Date Noted    UTI (urinary tract infection) [N39.0] 04/18/2021    Suspected COVID-19 virus infection [Z20.822] 04/18/2021    Falls frequently [R29.6] 04/18/2021    Acute encephalopathy [G93.40] 11/16/2020    UC (ulcerative colitis) ([facility] [address] [K51.90] 07/26/2011    Pacemaker [Z95.0] 07/26/2011    CAD (coronary artery disease) [I25.10] 07/26/2011    HTN (hypertension) [I10] 07/26/2011    Hypercholesteremia [E78.00] 07/26/2011         PLAN:    Multiple falls  -Lives at home alone but has a son that checks on her. He found her on the floor today. Unknown length of time. Covered in dried fecal matter and urine.  -Initially was completely confused and not talking or answering questions for the ED physician  -She is answering questions for me currently with a clear speech and no some orientation questions but cannot tell me what happened today.   -Report is that she has had multiple falls with EMS dispatching to her home frequently  -Social services consulted  -PT OT eval and treat  -Her medication list shows that she is taking Keppra. Unsure of why this is. Added on Keppra level and is pending at the time of this dictation. UTI  -Trace leukocytes with 1+ bacteria and 23 whites with 0 epithelial  -Was given Keflex and azithromycin in ED  -Change to oral Ceftin  -Wait for cultures    Suspected COVID-19  -Rapid Covid in ED negative  -CT of the chest abdomen and pelvis without contrast:  Hazy ground-glass opacities scattered in both lungs which could represent   early infiltrates from pneumonia vs atelectasis or scarring.  This pattern of   infiltrates has been described with COVID-19 disease and recommend   correlating with lab values and respiratory precautions.       Left pacemaker in good position.       Mildly dilated and atherosclerotic thoracic aorta with no aneurysm.       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       Moderate hiatal hernia with no mediastinal mass or adenopathy       No acute intra-abdominal abnormality.       Probable small perihilar cysts left kidney with no hydronephrosis or renal   stones.  Suggest ultrasound follow-up       Status post hysterectomy with no pelvic mass.       Scattered diverticula along the sigmoid colon with no pericolonic inflammation     -Repeat Covid  -Placed in isolation  -She received Keflex and azithromycin in ED. Will not continue for now. She is afebrile. She is not hypoxic. She does not require oxygen and she is in no respiratory distress with clear breath sounds. Encephalopathy  -Confused but seems to be clearing from ED report      Ulcerative colitis  -No current issues.   Continue meds    Hypertension  -Continue meds    Hypercholesterolemia  -Continue meds        DVT Prophylaxis: Lovenox  Diet: DIET CARDIAC;  Code Status: Full Code    PT/OT Eval Status: ordered   consulted    [facility] - admission       Sammi Hagen, APRN - CNP    Thank you [facility] for the opportunity to be involved in this patient's care. If you have any questions or concerns please feel free to contact me at [phone].

## 2021-04-18 NOTE — PLAN OF CARE
Problem: Falls - Risk of:  Goal: Will remain free from falls  Description: Will remain free from falls  4/18/2021 1430 by Rita Dasilva RN  Outcome: Ongoing  4/18/2021 0350 by Nik Goldsmith RN  Outcome: Ongoing  Goal: Absence of physical injury  Description: Absence of physical injury  4/18/2021 1430 by Rita Dasilva RN  Outcome: Ongoing  4/18/2021 0350 by Nik Goldsmith RN  Outcome: Ongoing     Problem: Urinary Elimination:  Goal: Signs and symptoms of infection will decrease  Description: Signs and symptoms of infection will decrease  4/18/2021 1430 by Rita Dasilva RN  Outcome: Ongoing  4/18/2021 0350 by Nik Goldsmith RN  Outcome: Ongoing     Problem: Pain:  Goal: Control of acute pain  Description: Control of acute pain  4/18/2021 1430 by Rita Dasilva RN  Outcome: Ongoing  4/18/2021 0350 by Nik Goldsmith RN  Outcome: Ongoing     Problem: Discharge Planning:  Goal: Discharged to appropriate level of care  Description: Discharged to appropriate level of care  4/18/2021 1430 by Rita Dasilva RN  Outcome: Ongoing  4/18/2021 0350 by Nik Goldsmith RN  Outcome: Ongoing

## 2021-04-18 NOTE — ED TRIAGE NOTES
Pt from home, EMS dispatched for fall. EMS states have been to residence multiple times but patient seemed more confused than normal tonight so brought to ED. Pt presents covered in stool, linens changed and cleaned on arrival. Denies pain, alert to place and self. No injuries noted.

## 2021-04-18 NOTE — ED NOTES
Upon patient arrival pt was dirty of stool, pt cleaned and clean linen placed.       Zeinab Guillermo RN  04/18/21 1346

## 2021-04-18 NOTE — PLAN OF CARE
Problem: Falls - Risk of:  Goal: Will remain free from falls  Description: Will remain free from falls  Outcome: Ongoing  Goal: Absence of physical injury  Description: Absence of physical injury  Outcome: Ongoing   Pt free from falls this shift. Fall precautions in place at all times. Call light always within reach. Pt able and agreeable to contact for safety appropriately. Problem: Urinary Elimination:  Goal: Signs and symptoms of infection will decrease  Description: Signs and symptoms of infection will decrease  Outcome: Ongoing     Problem: Pain:  Description: Pain management should include both nonpharmacologic and pharmacologic interventions. Goal: Control of acute pain  Description: Control of acute pain  Outcome: Ongoing   Pt able to express presence/absence of pain and rate pain appropriately using numerical scale. Pain/discomfort being managed with PRN analgesics per MD orders (see MAR). Pain assessed every shift and after interventions. Problem: Discharge Planning:  Goal: Discharged to appropriate level of care  Description: Discharged to appropriate level of care  Outcome: Ongoing   Continuing to work with patient and health care team on discharge plan. Discharge instructions and medication management will be reviewed prior to discharge.

## 2021-04-18 NOTE — FLOWSHEET NOTE
4 Eyes Skin Assessment     NAME:  Willie Zendejas  YOB: 1945  MEDICAL RECORD NUMBER:  [id number]    The patient is being assess for  Admission    I agree that 2 RN's have performed a thorough Head to Toe Skin Assessment on the patient. ALL assessment sites listed below have been assessed. Areas assessed by both nurses:    Head, Face, Ears, Shoulders, Back, Chest, Arms, Elbows, Hands, Sacrum. Buttock, Coccyx, Ischium and Legs. Feet and Heels        Does the Patient have a Wound?  No noted wound(s)       Albert Prevention initiated:  No   Wound Care Orders initiated:  No    Pressure Injury (Stage 3,4, Unstageable, DTI, NWPT, and Complex wounds) if present place consult order under [de-identified] No    New and Established Ostomies if present place consult order under : No      Nurse 1 eSignature: Electronically signed by Nola Varghese RN on 4/18/21 at 3:59 AM EDT    **SHARE this note so that the co-signing nurse is able to place an eSignature**    Nurse 2 eSignature: Electronically signed by Mare Vieyra RN on 4/18/21 at 4:02 AM EDT
Pt arrived to [id number] at [phone] via stretcher from ED with Dx:  UTI. Pt having bouts of loose stool. Hx of ulcerative Colitis. Very poor personal hygiene. Pt has not washed hair in a very long time. Foul smelling BM. Up with assist x 1 and pt is very unsteady. Per ED notes, EMS has been to pt's home many times in the past to pick her up from falls. Very confused and disoriented. Lilian care given and depends placed on pt. Oriented to room and call light. All fall precautions in place:  Bed low, side rails up x2, bed alarm on. Pt reminded to not get up without assist. Pt verbalized understanding. Call light in reach.
No

## 2021-04-19 LAB
A/G RATIO: 1 (ref 1.1–2.2)
ALBUMIN SERPL-MCNC: 3.3 G/DL (ref 3.4–5)
ALP BLD-CCNC: 71 U/L (ref 40–129)
ALT SERPL-CCNC: 7 U/L (ref 10–40)
ANION GAP SERPL CALCULATED.3IONS-SCNC: 9 MMOL/L (ref 3–16)
AST SERPL-CCNC: 13 U/L (ref 15–37)
BASOPHILS ABSOLUTE: 0.1 K/UL (ref 0–0.2)
BASOPHILS RELATIVE PERCENT: 1 %
BILIRUB SERPL-MCNC: <0.2 MG/DL (ref 0–1)
BUN BLDV-MCNC: 17 MG/DL (ref 7–20)
C DIFF TOXIN/ANTIGEN: NORMAL
CALCIUM SERPL-MCNC: 8.3 MG/DL (ref 8.3–10.6)
CHLORIDE BLD-SCNC: 109 MMOL/L (ref 99–110)
CO2: 21 MMOL/L (ref 21–32)
CREAT SERPL-MCNC: 0.7 MG/DL (ref 0.6–1.2)
EOSINOPHILS ABSOLUTE: 0.3 K/UL (ref 0–0.6)
EOSINOPHILS RELATIVE PERCENT: 3.9 %
GFR AFRICAN AMERICAN: >60
GFR NON-AFRICAN AMERICAN: >60
GLOBULIN: 3.3 G/DL
GLUCOSE BLD-MCNC: 84 MG/DL (ref 70–99)
HCT VFR BLD CALC: 30.7 % (ref 36–48)
HEMATOLOGY PATH CONSULT: NO
HEMOGLOBIN: 9.6 G/DL (ref 12–16)
LYMPHOCYTES ABSOLUTE: 2.5 K/UL (ref 1–5.1)
LYMPHOCYTES RELATIVE PERCENT: 37.7 %
MCH RBC QN AUTO: 19.4 PG (ref 26–34)
MCHC RBC AUTO-ENTMCNC: 31.2 G/DL (ref 31–36)
MCV RBC AUTO: 62.1 FL (ref 80–100)
MONOCYTES ABSOLUTE: 0.7 K/UL (ref 0–1.3)
MONOCYTES RELATIVE PERCENT: 11.2 %
NEUTROPHILS ABSOLUTE: 3.1 K/UL (ref 1.7–7.7)
NEUTROPHILS RELATIVE PERCENT: 46.2 %
PDW BLD-RTO: 19.3 % (ref 12.4–15.4)
PLATELET # BLD: 290 K/UL (ref 135–450)
PMV BLD AUTO: 8.3 FL (ref 5–10.5)
POTASSIUM REFLEX MAGNESIUM: 3.9 MMOL/L (ref 3.5–5.1)
RBC # BLD: 4.95 M/UL (ref 4–5.2)
SARS-COV-2, PCR: NOT DETECTED
SODIUM BLD-SCNC: 139 MMOL/L (ref 136–145)
TOTAL PROTEIN: 6.6 G/DL (ref 6.4–8.2)
URINE CULTURE, ROUTINE: NORMAL
WBC # BLD: 6.7 K/UL (ref 4–11)

## 2021-04-19 PROCEDURE — 6360000002 HC RX W HCPCS: Performed by: INTERNAL MEDICINE

## 2021-04-19 PROCEDURE — 2580000003 HC RX 258: Performed by: NURSE PRACTITIONER

## 2021-04-19 PROCEDURE — 87324 CLOSTRIDIUM AG IA: CPT

## 2021-04-19 PROCEDURE — 1200000000 HC SEMI PRIVATE

## 2021-04-19 PROCEDURE — U0005 INFEC AGEN DETEC AMPLI PROBE: HCPCS

## 2021-04-19 PROCEDURE — 6360000002 HC RX W HCPCS: Performed by: NURSE PRACTITIONER

## 2021-04-19 PROCEDURE — 97162 PT EVAL MOD COMPLEX 30 MIN: CPT

## 2021-04-19 PROCEDURE — 36415 COLL VENOUS BLD VENIPUNCTURE: CPT

## 2021-04-19 PROCEDURE — 6370000000 HC RX 637 (ALT 250 FOR IP): Performed by: NURSE PRACTITIONER

## 2021-04-19 PROCEDURE — 80053 COMPREHEN METABOLIC PANEL: CPT

## 2021-04-19 PROCEDURE — 97530 THERAPEUTIC ACTIVITIES: CPT

## 2021-04-19 PROCEDURE — U0003 INFECTIOUS AGENT DETECTION BY NUCLEIC ACID (DNA OR RNA); SEVERE ACUTE RESPIRATORY SYNDROME CORONAVIRUS 2 (SARS-COV-2) (CORONAVIRUS DISEASE [COVID-19]), AMPLIFIED PROBE TECHNIQUE, MAKING USE OF HIGH THROUGHPUT TECHNOLOGIES AS DESCRIBED BY CMS-2020-01-R: HCPCS

## 2021-04-19 PROCEDURE — 2580000003 HC RX 258: Performed by: INTERNAL MEDICINE

## 2021-04-19 PROCEDURE — 85025 COMPLETE CBC W/AUTO DIFF WBC: CPT

## 2021-04-19 PROCEDURE — 97166 OT EVAL MOD COMPLEX 45 MIN: CPT

## 2021-04-19 PROCEDURE — 87449 NOS EACH ORGANISM AG IA: CPT

## 2021-04-19 PROCEDURE — 97535 SELF CARE MNGMENT TRAINING: CPT

## 2021-04-19 PROCEDURE — 94761 N-INVAS EAR/PLS OXIMETRY MLT: CPT

## 2021-04-19 RX ADMIN — ENOXAPARIN SODIUM 40 MG: 40 INJECTION SUBCUTANEOUS at 10:53

## 2021-04-19 RX ADMIN — PANTOPRAZOLE SODIUM 40 MG: 40 TABLET, DELAYED RELEASE ORAL at 06:36

## 2021-04-19 RX ADMIN — CEFTRIAXONE 1000 MG: 1 INJECTION, POWDER, FOR SOLUTION INTRAMUSCULAR; INTRAVENOUS at 10:53

## 2021-04-19 RX ADMIN — LEVETIRACETAM 250 MG: 500 TABLET ORAL at 21:00

## 2021-04-19 RX ADMIN — CLOPIDOGREL BISULFATE 75 MG: 75 TABLET ORAL at 10:53

## 2021-04-19 RX ADMIN — ATORVASTATIN CALCIUM 40 MG: 40 TABLET, FILM COATED ORAL at 10:53

## 2021-04-19 RX ADMIN — SODIUM CHLORIDE, PRESERVATIVE FREE 10 ML: 5 INJECTION INTRAVENOUS at 10:53

## 2021-04-19 RX ADMIN — LEVETIRACETAM 250 MG: 500 TABLET ORAL at 10:53

## 2021-04-19 RX ADMIN — LISINOPRIL 20 MG: 20 TABLET ORAL at 10:53

## 2021-04-19 RX ADMIN — SODIUM CHLORIDE, PRESERVATIVE FREE 10 ML: 5 INJECTION INTRAVENOUS at 23:29

## 2021-04-19 RX ADMIN — PANTOPRAZOLE SODIUM 40 MG: 40 TABLET, DELAYED RELEASE ORAL at 18:31

## 2021-04-19 RX ADMIN — METOPROLOL SUCCINATE 25 MG: 25 TABLET, EXTENDED RELEASE ORAL at 10:53

## 2021-04-19 ASSESSMENT — PAIN SCALES - GENERAL: PAINLEVEL_OUTOF10: 0

## 2021-04-19 NOTE — PLAN OF CARE
Problem: Falls - Risk of:  Goal: Will remain free from falls  Description: Will remain free from falls  Outcome: Ongoing  Goal: Absence of physical injury  Description: Absence of physical injury  Outcome: Ongoing     Problem: Urinary Elimination:  Goal: Signs and symptoms of infection will decrease  Description: Signs and symptoms of infection will decrease  Outcome: Ongoing     Problem: Pain:  Goal: Control of acute pain  Description: Control of acute pain  Outcome: Ongoing     Problem: Discharge Planning:  Goal: Discharged to appropriate level of care  Description: Discharged to appropriate level of care  Outcome: Ongoing     Problem: Skin Integrity:  Goal: Will show no infection signs and symptoms  Description: Will show no infection signs and symptoms  Outcome: Ongoing  Goal: Absence of new skin breakdown  Description: Absence of new skin breakdown  Outcome: Ongoing

## 2021-04-19 NOTE — PROGRESS NOTES
Occupational Therapy   Occupational Therapy Initial Assessment  Date: 2021   Patient Name: Romayne Doom  MRN: [id number]     : 1945    Date of Service: 2021    Discharge Recommendations:  24 hour supervision or assist, Home with Home health OT       Assessment   Performance deficits / Impairments: Decreased functional mobility ; Decreased ADL status; Decreased endurance;Decreased cognition;Decreased balance;Decreased safe awareness;Decreased high-level IADLs  Assessment: Pt is a 77 y.o. female admitted with falls, UTI, encephalopathy, COVID r/o. Pt reports lives with son and independent ADLs and fxl mobility. Pt poor historian and gives conflicting report from H&P, per H&P pt lives alone and son checks on her. Pt currently functioning below baseline d/t the above deficits, today requiring SBA/CGA fxl transfers, CGA fxl mobility with no AD, and anticipate pt would require overall mod A for ADLs. Will cont to see on acute to address the above limitations and maximize pt's safety and independence. Anticipate pt will progress to be able to return home with initial 24/7 assist and home OT. Prognosis: Good  Decision Making: Medium Complexity  History: see above  Exam: self-care, ROM, balance/fxl mobility, cognition  Assistance / Modification: anticipate overall mod A for ADLs  OT Education: OT Role;Plan of Care;ADL Adaptive Strategies;Transfer Training;Orientation  Barriers to Learning: cognition, hearing  REQUIRES OT FOLLOW UP: Yes  Activity Tolerance  Activity Tolerance: Treatment limited secondary to decreased cognition  Safety Devices  Safety Devices in place: Yes  Type of devices: Call light within reach; Chair alarm in place; Left in chair;Gait belt           Patient Diagnosis(es): The primary encounter diagnosis was Altered mental status, unspecified altered mental status type. Diagnoses of Acute cystitis without hematuria and Pneumonia due to organism were also pertinent to this visit.      has a past medical history of Acute MI ([facility] [address] Eczema, [place] (hard of hearing), Hypercholesteremia, Hypertension, Pacemaker, Ulcerative colitis, and Wears hearing aid. has a past surgical history that includes Hysterectomy (1996); Pacemaker insertion; Coronary angioplasty with stent; Ankle fracture surgery (7/26/11); rhinoplasty; Upper gastrointestinal endoscopy (N/A, 1/20/2020); and Colonoscopy (N/A, 1/20/2020). Restrictions  Restrictions/Precautions  Restrictions/Precautions: Contact Precautions, Isolation  Position Activity Restriction  Other position/activity restrictions: COVID r/o; RA    Subjective   General  Chart Reviewed: Yes  Patient assessed for rehabilitation services?: Yes  Additional Pertinent Hx: Per H&P: \"66 y.o. female with PMHx of myocardial infarction, hypercholesterolemia, hypertension, ulcerative colitis, coronary artery disease with stent placement, pacemaker presented to [facility] emergency department via EMS for reports of found on ground and confused. Patient reportedly has early dementia although I cannot find this in the chart. The history of present illness is mainly taken from the EMS report and ED physician and nurse report. The patient is confused currently. The report is that the patient has had multiple falls at home. She lives at home by herself. She has a son that comes and checks on her. Son reportedly found her on the floor in her home today covered in fecal matter and urine. The patient was incoherent so he called 911\"  Family / Caregiver Present: No  Referring Practitioner: Vijay Vergara MD  Diagnosis: falls, UTI, encephalopathy, COVID r/o  Subjective  Subjective: Pt met b/s for OT eval/tx with PT. Pt in bed sleeping on arrival, awakens to call of name and agreeable to participate in therapy. Pt with flat affect and delayed processing/responses.      Social/Functional History  Social/Functional History  Lives With: Alone, Son(conflicting reports on living with assistance     Cognition  Overall Cognitive Status: Exceptions  Arousal/Alertness: Delayed responses to stimuli  Following Commands: Follows one step commands with increased time; Follows one step commands with repetition  Attention Span: Attends with cues to redirect  Memory: Decreased recall of recent events;Decreased short term memory  Safety Judgement: Decreased awareness of need for safety;Decreased awareness of need for assistance  Problem Solving: Decreased awareness of errors;Assistance required to identify errors made;Assistance required to generate solutions  Insights: Decreased awareness of deficits  Initiation: Requires cues for some  Sequencing: Requires cues for some     LUE AROM (degrees)  LUE AROM : WFL  RUE AROM (degrees)  RUE AROM : WFL                      Plan   Plan  Times per week: 3-5  Current Treatment Recommendations: Functional Mobility Training, Balance Training, Strengthening, Endurance Training, Safety Education & Training, Self-Care / ADL, Cognitive/Perceptual Training, Cognitive Reorientation    AM-PAC Score        AM-[facility] Inpatient Daily Activity Raw Score: 16 (04/19/21 0955)  AM-PAC Inpatient ADL T-Scale Score : 35.96 (04/19/21 0955)  ADL Inpatient CMS 0-100% Score: 53.32 (04/19/21 0955)  ADL Inpatient CMS G-Code Modifier : CK (04/19/21 0955)    Goals  Short term goals  Time Frame for Short term goals: prior to d/c:  Short term goal 1: Pt will bathe with supervision. Short term goal 2: Pt will dress with supervision. Short term goal 3: Pt will toilet with supervision. Short term goal 4: Pt will complete fxl mobility and fxl transfers to/from ADL surfaces independently. Short term goal 5: Further assess pt's cognitive status with efforts to improve pt's orientation and problem solving. Long term goals  Time Frame for Long term goals : STGs=LTGs  Patient Goals   Patient goals : pt unable to verbalize personal therapy goal d/t decreased cognition.        Therapy Time   Individual Concurrent Group Co-treatment   Time In 0908         Time Out 0946         Minutes 38         Timed Code Treatment Minutes: 23 Minutes     This note to serve as OT d/c summary if pt is d/c-ed prior to next therapy session.     Jada Lin, OTR/L [id number]

## 2021-04-19 NOTE — CARE COORDINATION
INITIAL CASE MANAGEMENT ASSESSMENT    Reviewed chart, met with patient to assess possible discharge needs. Patient is hard of hearing and asked Sw to call her son, Carla Luis. Sw placed call to Carla Luis, Explained Case Management role/services. Living Situation: lives with son, confirmed address, son reports he suffered a stroke years ago and the patient was his caretaker, she is now unable to care for him    ADLs: son reports that the patient struggles with all ADLs     DME: none    PT/OT Recs:     OT Discharge Recommendations:  24 hour supervision or assist, Home with Home health OT  AM-PAC Inpatient Daily Activity Raw Score: 16    PT Discharge Recommendations:  Home with assist PRN      Active Services: none reported, [facility] in the past but son reports that patient refused to work with home health care     Transportation: son transports to appointments      Medications: No issues reported     PCP: confirmed Shireen Hussein MD     PLAN/COMMENTS: Son reports patient is unsafe to return to his home, he states the patient has had multiple falls in the past year and that he is unable to care for her. Sw met with patient after speaking with patient's son to discuss plan. Patient is hard of hearing and struggled to hear this Sw. Sw wrote down discussion with patient regarding discharge plan and SNF placement. Provided patient with a CMS SNF list for review. Patient in agreement with referral to [facility]. Sw call to Emily Morris with [facility] regarding referral. SNF to start precert today. The Plan for Transition of Care is related to the following treatment goals: assistance     The Patient was provided with a choice of provider and agrees   with the discharge plan. [x] Yes [] No    Freedom of choice list was provided with basic dialogue that supports the patient's individualized plan of care/goals, treatment preferences and shares the quality data associated with the providers.  [x] Yes [] No    SW/BRETT

## 2021-04-19 NOTE — PLAN OF CARE
Problem: Falls - Risk of:  Goal: Will remain free from falls  Description: Will remain free from falls  4/18/2021 2239 by Sia Martinez RN  Outcome: Ongoing  4/18/2021 1430 by Matt Magdaleno RN  Outcome: Ongoing  Goal: Absence of physical injury  Description: Absence of physical injury  4/18/2021 2239 by Sia Martinez RN  Outcome: Ongoing  4/18/2021 1430 by Matt Magdaleno RN  Outcome: Ongoing     Problem: Urinary Elimination:  Goal: Signs and symptoms of infection will decrease  Description: Signs and symptoms of infection will decrease  4/18/2021 2239 by Sia Martinez RN  Outcome: Ongoing  4/18/2021 1430 by Matt Magdaleno RN  Outcome: Ongoing     Problem: Pain:  Goal: Control of acute pain  Description: Control of acute pain  4/18/2021 2239 by Sia Martinez RN  Outcome: Ongoing  4/18/2021 1430 by Matt Magdaleno RN  Outcome: Ongoing     Problem: Discharge Planning:  Goal: Discharged to appropriate level of care  Description: Discharged to appropriate level of care  4/18/2021 2239 by Sia Martinez RN  Outcome: Ongoing  4/18/2021 1430 by Matt Magdaleno RN  Outcome: Ongoing     Problem: Skin Integrity:  Goal: Will show no infection signs and symptoms  Description: Will show no infection signs and symptoms  Outcome: Ongoing  Goal: Absence of new skin breakdown  Description: Absence of new skin breakdown  Outcome: Ongoing

## 2021-04-19 NOTE — PROGRESS NOTES
Hospitalist Progress Note      PCP: Fariha Kennedy    Date of Admission: 4/17/2021    Chief Complaint: [facility] Course: Patient is a 78-year-old female with a past medical history of CAD, hyperlipidemia, hypertension, ulcerative colitis and a pacemaker who presented to the hospital after being found down on the ground confused. Patient has early onset dementia per family. In the emergency department patient was found covered in fecal matter in urine. She was found to have a urinary tract infection and started on broad-spectrum IV antibiotics. Subjective: Patient seen and examined. Patient really hard of hearing. Understanding everything that I am writing down and answering questions appropriately. Denies any issues at the moment. We will continue IV antibiotics as long as we have IV access and if not we can switch over to oral antibiotics. Therapy is recommending 24 hour supervision with continued home OT and PT. Plan for discharge tomorrow if she remains stable.       Medications:  Reviewed    Infusion Medications    sodium chloride       Scheduled Medications    atorvastatin  40 mg Oral Daily    balsalazide  2,250 mg Oral TID    clopidogrel  75 mg Oral Daily    levETIRAcetam  250 mg Oral BID    lisinopril  20 mg Oral Daily    metoprolol succinate  25 mg Oral Daily    pantoprazole  40 mg Oral BID AC    sodium chloride flush  5-40 mL Intravenous 2 times per day    enoxaparin  40 mg Subcutaneous Daily    cefUROXime  500 mg Oral 2 times per day     PRN Meds: sodium chloride flush, sodium chloride, promethazine **OR** ondansetron, polyethylene glycol, acetaminophen **OR** acetaminophen      Intake/Output Summary (Last 24 hours) at 4/19/2021 0855  Last data filed at 4/18/2021 2050  Gross per 24 hour   Intake 60 ml   Output --   Net 60 ml       Physical Exam Performed:    /66   Pulse 73   Temp 98.7 °F (37.1 °C) (Oral)   Resp 18   Ht 5' (1.524 m)   Wt 161 lb 9.6 oz (73.3 kg)   SpO2 93%   BMI 31.56 kg/m²     General appearance: No apparent distress, appears stated age and cooperative. HEENT: Pupils equal, round, and reactive to light. Conjunctivae/corneas clear. Neck: Supple, with full range of motion. No jugular venous distention. Trachea midline. Respiratory:  Normal respiratory effort. Clear to auscultation, bilaterally without Rales/Wheezes/Rhonchi. Cardiovascular: Regular rate and rhythm with normal S1/S2 without murmurs, rubs or gallops. Abdomen: Soft, non-tender, non-distended with normal bowel sounds. Musculoskeletal: No clubbing, cyanosis or edema bilaterally. Full range of motion without deformity. Skin: Skin color, texture, turgor normal.  No rashes or lesions. Neurologic:  Neurovascularly intact without any focal sensory/motor deficits. Cranial nerves: II-XII intact, grossly non-focal.  Psychiatric: Alert and oriented, thought content appropriate, normal insight  Capillary Refill: Brisk,< 3 seconds   Peripheral Pulses: +2 palpable, equal bilaterally       Labs:   Recent Labs     04/18/21  0016 04/18/21  0644 04/19/21  0600   WBC 8.4 6.0 6.7   HGB 11.1* 10.5* 9.6*   HCT 35.5* 32.9* 30.7*    294 290     Recent Labs     04/18/21  0016 04/18/21  0644 04/19/21  0600   * 140 139   K 3.9 3.5 3.9    105 109   CO2 23 22 21   BUN 17 13 17   CREATININE 0.8 0.7 0.7   CALCIUM 8.4 7.8* 8.3     Recent Labs     04/18/21  0016 04/18/21  0644 04/19/21  0600   AST 14* 13* 13*   ALT 8* 7* 7*   BILIDIR <0.2  --   --    BILITOT 0.4 0.3 <0.2   ALKPHOS 91 84 71     No results for input(s): INR in the last 72 hours.   Recent Labs     04/18/21  0016 04/18/21  0351 04/18/21  0644   CKTOTAL 127  --   --    TROPONINI <0.01 <0.01 <0.01       Urinalysis:      Lab Results   Component Value Date    NITRU Negative 04/18/2021    WBCUA 23 04/18/2021    BACTERIA 1+ 04/18/2021    RBCUA 4 04/18/2021    BLOODU SMALL 04/18/2021    SPECGRAV 1.022 04/18/2021    GLUCOSEU Negative 04/18/2021    GLUCOSEU NEGATIVE 11/02/2010       Radiology:  CT CHEST ABDOMEN PELVIS WO CONTRAST   Final Result   Hazy ground-glass opacities scattered in both lungs which could represent   early infiltrates from pneumonia vs atelectasis or scarring. This pattern of   infiltrates has been described with COVID-19 disease and recommend   correlating with lab values and respiratory precautions. Left pacemaker in good position. Mildly dilated and atherosclerotic thoracic aorta with no aneurysm. 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 Moderate hiatal hernia with no mediastinal mass or adenopathy      No acute intra-abdominal abnormality. Probable small perihilar cysts left kidney with no hydronephrosis or renal   stones. Suggest ultrasound follow-up      Status post hysterectomy with no pelvic mass. Scattered diverticula along the sigmoid colon with no pericolonic inflammation         CT HEAD WO CONTRAST   Final Result   1. No acute intracranial abnormality. 2. Moderate cerebral white matter chronic microvascular ischemic disease. 3. Right parietal temporal lobe remote infarction. 4. Bilateral basal ganglia multifocal remote lacunar infarcts, as discussed   above. 5. 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                  Assessment/Plan:    Urinary tract infection  Continue Rocephin  Await culture and sensitivity    Acute metabolic encephalopathy  Likely secondary to UTI in the setting of dementia  Continue to assess    Hypertension  Continue home medications    Hyperlipidemia  Continue home statin    Bilateral patchy opacities  Rapid Covid negative  PCR Covid sent: neg  Contact and droplet isolation: stop    DVT Prophylaxis: lovenox  Diet: DIET CARDIAC;  Code Status: Full Code    PT/OT Eval Status: Home PT/OT    Renu Bejarano MD

## 2021-04-19 NOTE — PROGRESS NOTES
Physical Therapy    Facility/Department: [address] MED SURG  Initial Assessment    NAME: Sherlyn Balderas  : 1945  MRN: [id number]    Date of Service: 2021    Discharge Recommendations:  Home with assist PRN        Assessment   Body structures, Functions, Activity limitations: Decreased functional mobility ; Decreased ADL status  Assessment: 77 y.o. female with PMHx of myocardial infarction, hypercholesterolemia, hypertension, ulcerative colitis, coronary artery disease with stent placement, pacemaker presented to [facility] emergency department via EMS on 21 for reports of found on ground and confused. Pt found to have UTI. There is some confusion as to whether the pt lives alone or lives with her [place] - she reports she does indeed live with her son - reporting she was independent with all functional mobility prior to admission, not using any AD prior to admission. Pt currently functioning near her baseline. Anticipate home with PRN assist from son. No further PT beyond acute discharge. Treatment Diagnosis: impaired mobility  Prognosis: Good  Decision Making: Medium Complexity  History: see below  Exam: see below  Clinical Presentation: evolving  PT Education: PT Role;Plan of Care;Transfer Training;General Safety;Gait Training;Functional Mobility Training  Barriers to Learning: cognition; [place]  REQUIRES PT FOLLOW UP: Yes  Activity Tolerance  Activity Tolerance: Patient Tolerated treatment well       Patient Diagnosis(es): The primary encounter diagnosis was Altered mental status, unspecified altered mental status type. Diagnoses of Acute cystitis without hematuria and Pneumonia due to organism were also pertinent to this visit. has a past medical history of Acute MI ([address] Eczema, [place] (hard of hearing), Hypercholesteremia, Hypertension, Pacemaker, Ulcerative colitis, and Wears hearing aid. has a past surgical history that includes Hysterectomy ();  Pacemaker insertion; Coronary angioplasty with stent; Ankle fracture surgery (7/26/11); rhinoplasty; Upper gastrointestinal endoscopy (N/A, 1/20/2020); and Colonoscopy (N/A, 1/20/2020). Restrictions  Restrictions/Precautions  Restrictions/Precautions: Contact Precautions, Isolation  Position Activity Restriction  Other position/activity restrictions: COVID r/o; RA     Vision/Hearing  Hearing: Exceptions to [facility]  Hearing Exceptions: Hard of hearing/hearing concerns       Subjective  General  Chart Reviewed: Yes  Patient assessed for rehabilitation services?: Yes  Additional Pertinent Hx: 77 y.o. female with PMHx of myocardial infarction, hypercholesterolemia, hypertension, ulcerative colitis, coronary artery disease with stent placement, pacemaker presented to [facility] emergency department via EMS on 4/17/21 for reports of found on ground and confused. Pt found to have UTI. Response To Previous Treatment: Not applicable  Family / Caregiver Present: No  Referring Practitioner: ROBERT Mosqueda CNP  Referral Date : 04/18/21  Diagnosis: UTI  Follows Commands: Within Functional Limits  Subjective  Subjective: Pt is agreeable to PT. Denies pain.           Orientation  Orientation  Overall Orientation Status: Impaired  Orientation Level: Oriented to place;Oriented to person;Disoriented to time;Disoriented to situation(pt knew it was a hospital)     Social/Functional History  Social/Functional History  Lives With: Alone, Son(conflicting reports on living with son vs alone)  Type of Home: House  Home Layout: One level  Home Access: Stairs to enter without rails  Entrance Stairs - Number of Steps: 1 + 1 via Blend  Entrance Stairs - Rails: None  Bathroom Shower/Tub: Tub/Shower unit  Bathroom Toilet: Standard  Bathroom Equipment: Shower chair  Bathroom Accessibility: Wheelchair accessible  Home Equipment: Rolling walker  Receives Help From: Family(son)  ADL Assistance: Independent  Homemaking Assistance: Independent  Ambulation Assistance: Independent  Transfer Assistance: Independent  Active : No  Patient's  Info: son drives  Mode of Transportation: Car  Occupation: Retired  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 Information taken from November 2020 admission. Cognition   Cognition  Overall Cognitive Status: Exceptions  Arousal/Alertness: Delayed responses to stimuli  Following Commands: Follows one step commands with increased time; Follows one step commands with repetition  Attention Span: Attends with cues to redirect  Memory: Decreased recall of recent events;Decreased short term memory  Safety Judgement: Decreased awareness of need for safety;Decreased awareness of need for assistance  Problem Solving: Decreased awareness of errors;Assistance required to identify errors made;Assistance required to generate solutions  Insights: Decreased awareness of deficits  Initiation: Requires cues for some  Sequencing: Requires cues for some    Objective  Strength RLE  Strength RLE: WFL  Strength LLE  Strength LLE: WFL  Motor Control  Gross Motor?: WFL     Bed mobility  Supine to Sit: Stand by assistance  Sit to Supine: Unable to assess(in recliner at end of session)  Transfers  Sit to Stand: Stand by assistance;Contact guard assistance  Stand to sit: Stand by assistance;Contact guard assistance  Ambulation  Ambulation?: Yes  Ambulation 1  Surface: level tile  Device: No Device  Assistance: Stand by assistance;Contact guard assistance  Gait Deviations: Slow [place];Decreased step length;Decreased step height  Distance: 20'  Comments: steady     Balance  Posture: Good  Sitting - Static: Good  Sitting - Dynamic: Good  Standing - Static: Good  Standing - Dynamic: Good        Plan   Plan  Times per week:

## 2021-04-20 LAB
A/G RATIO: 0.9 (ref 1.1–2.2)
ALBUMIN SERPL-MCNC: 3.3 G/DL (ref 3.4–5)
ALP BLD-CCNC: 74 U/L (ref 40–129)
ALT SERPL-CCNC: 8 U/L (ref 10–40)
ANION GAP SERPL CALCULATED.3IONS-SCNC: 12 MMOL/L (ref 3–16)
AST SERPL-CCNC: 15 U/L (ref 15–37)
BASOPHILS ABSOLUTE: 0 K/UL (ref 0–0.2)
BASOPHILS RELATIVE PERCENT: 0.3 %
BILIRUB SERPL-MCNC: <0.2 MG/DL (ref 0–1)
BUN BLDV-MCNC: 22 MG/DL (ref 7–20)
CALCIUM SERPL-MCNC: 8.4 MG/DL (ref 8.3–10.6)
CHLORIDE BLD-SCNC: 111 MMOL/L (ref 99–110)
CO2: 20 MMOL/L (ref 21–32)
CREAT SERPL-MCNC: 0.9 MG/DL (ref 0.6–1.2)
EOSINOPHILS ABSOLUTE: 0.3 K/UL (ref 0–0.6)
EOSINOPHILS RELATIVE PERCENT: 4.1 %
GFR AFRICAN AMERICAN: >60
GFR NON-AFRICAN AMERICAN: >60
GLOBULIN: 3.5 G/DL
GLUCOSE BLD-MCNC: 90 MG/DL (ref 70–99)
HCT VFR BLD CALC: 30.8 % (ref 36–48)
HEMATOLOGY PATH CONSULT: NO
HEMOGLOBIN: 9.5 G/DL (ref 12–16)
LYMPHOCYTES ABSOLUTE: 2.6 K/UL (ref 1–5.1)
LYMPHOCYTES RELATIVE PERCENT: 38.9 %
MCH RBC QN AUTO: 19.2 PG (ref 26–34)
MCHC RBC AUTO-ENTMCNC: 30.8 G/DL (ref 31–36)
MCV RBC AUTO: 62.5 FL (ref 80–100)
MONOCYTES ABSOLUTE: 0.8 K/UL (ref 0–1.3)
MONOCYTES RELATIVE PERCENT: 11.8 %
NEUTROPHILS ABSOLUTE: 3 K/UL (ref 1.7–7.7)
NEUTROPHILS RELATIVE PERCENT: 44.9 %
PDW BLD-RTO: 19.7 % (ref 12.4–15.4)
PLATELET # BLD: 299 K/UL (ref 135–450)
PMV BLD AUTO: 8.3 FL (ref 5–10.5)
POTASSIUM REFLEX MAGNESIUM: 3.8 MMOL/L (ref 3.5–5.1)
RBC # BLD: 4.93 M/UL (ref 4–5.2)
SODIUM BLD-SCNC: 143 MMOL/L (ref 136–145)
TOTAL PROTEIN: 6.8 G/DL (ref 6.4–8.2)
WBC # BLD: 6.6 K/UL (ref 4–11)

## 2021-04-20 PROCEDURE — 36415 COLL VENOUS BLD VENIPUNCTURE: CPT

## 2021-04-20 PROCEDURE — 85025 COMPLETE CBC W/AUTO DIFF WBC: CPT

## 2021-04-20 PROCEDURE — 6370000000 HC RX 637 (ALT 250 FOR IP): Performed by: NURSE PRACTITIONER

## 2021-04-20 PROCEDURE — 80053 COMPREHEN METABOLIC PANEL: CPT

## 2021-04-20 PROCEDURE — 2580000003 HC RX 258: Performed by: INTERNAL MEDICINE

## 2021-04-20 PROCEDURE — 97535 SELF CARE MNGMENT TRAINING: CPT

## 2021-04-20 PROCEDURE — 1200000000 HC SEMI PRIVATE

## 2021-04-20 PROCEDURE — 6360000002 HC RX W HCPCS: Performed by: INTERNAL MEDICINE

## 2021-04-20 PROCEDURE — 2580000003 HC RX 258: Performed by: NURSE PRACTITIONER

## 2021-04-20 PROCEDURE — 6360000002 HC RX W HCPCS: Performed by: NURSE PRACTITIONER

## 2021-04-20 PROCEDURE — 6370000000 HC RX 637 (ALT 250 FOR IP): Performed by: FAMILY MEDICINE

## 2021-04-20 RX ORDER — DONEPEZIL HYDROCHLORIDE 5 MG/1
5 TABLET, FILM COATED ORAL NIGHTLY
Status: DISCONTINUED | OUTPATIENT
Start: 2021-04-20 | End: 2021-04-21 | Stop reason: HOSPADM

## 2021-04-20 RX ORDER — DONEPEZIL HYDROCHLORIDE 5 MG/1
5 TABLET, FILM COATED ORAL NIGHTLY
Qty: 30 TABLET | Refills: 3 | Status: SHIPPED | OUTPATIENT
Start: 2021-04-20

## 2021-04-20 RX ORDER — CEPHALEXIN 500 MG/1
500 CAPSULE ORAL 3 TIMES DAILY
Qty: 6 CAPSULE | Refills: 0 | Status: SHIPPED | OUTPATIENT
Start: 2021-04-21 | End: 2021-04-23

## 2021-04-20 RX ADMIN — LISINOPRIL 20 MG: 20 TABLET ORAL at 09:41

## 2021-04-20 RX ADMIN — LEVETIRACETAM 250 MG: 500 TABLET ORAL at 20:35

## 2021-04-20 RX ADMIN — SODIUM CHLORIDE, PRESERVATIVE FREE 10 ML: 5 INJECTION INTRAVENOUS at 09:41

## 2021-04-20 RX ADMIN — CEFTRIAXONE 1000 MG: 1 INJECTION, POWDER, FOR SOLUTION INTRAMUSCULAR; INTRAVENOUS at 09:40

## 2021-04-20 RX ADMIN — LEVETIRACETAM 250 MG: 500 TABLET ORAL at 09:41

## 2021-04-20 RX ADMIN — METOPROLOL SUCCINATE 25 MG: 25 TABLET, EXTENDED RELEASE ORAL at 09:41

## 2021-04-20 RX ADMIN — ATORVASTATIN CALCIUM 40 MG: 40 TABLET, FILM COATED ORAL at 09:41

## 2021-04-20 RX ADMIN — PANTOPRAZOLE SODIUM 40 MG: 40 TABLET, DELAYED RELEASE ORAL at 16:44

## 2021-04-20 RX ADMIN — ENOXAPARIN SODIUM 40 MG: 40 INJECTION SUBCUTANEOUS at 09:40

## 2021-04-20 RX ADMIN — PANTOPRAZOLE SODIUM 40 MG: 40 TABLET, DELAYED RELEASE ORAL at 06:20

## 2021-04-20 RX ADMIN — SODIUM CHLORIDE, PRESERVATIVE FREE 10 ML: 5 INJECTION INTRAVENOUS at 20:39

## 2021-04-20 RX ADMIN — CLOPIDOGREL BISULFATE 75 MG: 75 TABLET ORAL at 09:41

## 2021-04-20 RX ADMIN — DONEPEZIL HYDROCHLORIDE 5 MG: 5 TABLET, FILM COATED ORAL at 20:35

## 2021-04-20 NOTE — CONSULTS
A consult to Neurology was placed this afternoon. Discussed w/ ordering physician. Apparently a different Hospitalist is managing her care and has plans to discharge the patient as there is a summary already in the chart. If Neurology assistance is needed, please call back. Thank you.      Andreas Pineda NP  [address] [place] [address] [address] Neurology

## 2021-04-20 NOTE — CARE COORDINATION
AMBER placed [phone] and left a message for [facility] at [facility] regarding pre-cert. AMBER will wait for a response. Respectfully submitted,    TARA Gracia  [facility]   [phone]    Electronically signed by TARA James on 4/20/2021 at 9:02 AM

## 2021-04-20 NOTE — DISCHARGE INSTR - COC
Continuity of Care Form    Patient Name: Brandan Akers   :  1945  MRN:  [id number]    Admit date:  2021  Discharge date:  2021    Code Status Order: Full Code   Advance Directives:   Advance Care Flowsheet Documentation       Date/Time Healthcare Directive Type of Healthcare Directive Copy in [address] Agent's Name Healthcare Agent's Phone Number    [phone]  Unknown, patient unable to respond due to medical condition -- -- -- -- --            Admitting Physician:  Cely Eddy MD  PCP: Nile Lara    Discharging Nurse: [facility] Unit/Room#: F4T-4711/3335-25  Discharging Unit Phone Number: [phone]    Emergency Contact:   Extended Emergency Contact Information  [place] Emergency Contact: [place] Phone: [phone]  Mobile Phone: [phone]  Relation: Child  Secondary Emergency Contact: Brett Sutton  [place] Phone: [phone]  Relation: Child    Past Surgical History:  Past Surgical History:   Procedure Laterality Date    ANKLE FRACTURE SURGERY  11    ORIF left ankle fracture    COLONOSCOPY N/A 2020    COLONOSCOPY WITH BIOPSY performed by Laureen Navarrete MD at [facility]      x5   [facility]      RHINOPLASTY      UPPER GASTROINTESTINAL ENDOSCOPY N/A 2020    EGD BIOPSY performed by Laureen Navarrete MD at [address] [place] [address]       Immunization History:   Immunization History   Administered Date(s) Administered    Influenza Vaccine, unspecified formulation 2007, 2016    PPD Test 2020    Pneumococcal Conjugate 13-valent (Nzywoug24) 2017    Pneumococcal Conjugate Vaccine 2017       Active Problems:  Patient Active Problem List   Diagnosis Code    Ankle fracture S82.899A    CAD (coronary artery disease) I25.10    Pacemaker Z95.0    HTN (hypertension) I10    Hypercholesteremia E78.00    UC (ulcerative colitis) ([facility] [address] K51.90    Intractable headache R51.9    Acute blood loss anemia D62    Atypical syncope R55    Acute encephalopathy G93.40    Acute metabolic encephalopathy K49.64    UTI (urinary tract infection) N39.0    Suspected COVID-19 virus infection Z20.822    Falls frequently R29.6       Isolation/Infection:   Isolation            No Isolation          Patient Infection Status       Infection Onset Added Last Indicated Last Indicated By Review Planned Expiration Resolved Resolved By    None active    Resolved    COVID-19 Rule Out 04/18/21 04/18/21 04/19/21 COVID-19 (Ordered)   04/19/21 Rule-Out Test Resulted    COVID-19 Rule Out 04/18/21 04/18/21 04/18/21 COVID-19, Rapid (Ordered)   04/18/21 Rule-Out Test Resulted    C-diff Rule Out 04/17/21 04/17/21 04/19/21 Clostridium Difficile Toxin/Antigen (Ordered)   04/19/21 Rule-Out Test Resulted            Nurse Assessment:  Last Vital Signs: /74   Pulse 66   Temp 97.8 °F (36.6 °C) (Oral)   Resp 16   Ht 5' (1.524 m)   Wt 161 lb 9.6 oz (73.3 kg)   SpO2 92%   BMI 31.56 kg/m²     Last documented pain score (0-10 scale): Pain Level: 0  Last Weight:   Wt Readings from Last 1 Encounters:   04/20/21 161 lb 9.6 oz (73.3 kg)     Mental Status:  oriented, alert and confused at times    IV Access:  - None    Nursing Mobility/ADLs:  Walking   Assisted  Transfer  Assisted  Bathing  Assisted  Dressing  Assisted  Toileting  Assisted  Feeding  Independent  Med Admin  Assisted  Med Delivery   whole    Wound Care Documentation and Therapy:  Incision 07/26/11 Ankle Left (Active)   Number of days: 2297        Elimination:  Continence:   · Bowel: No  · Bladder: No  Urinary Catheter: None   Colostomy/Ileostomy/Ileal Conduit: No       Date of Last BM: 4/20/2021    Intake/Output Summary (Last 24 hours) at 4/20/2021 0923  Last data filed at 4/19/2021 1917  Gross per 24 hour   Intake 960 ml   Output --   Net 960 ml     I/O last 3 completed shifts: In: 12 [P.O.:960]  Out: -     Safety Concerns:     Sund[place], At Risk for Falls and History of Seizures    Impairments/Disabilities:      Vision and Hearing    Nutrition Therapy:  Current Nutrition Therapy:   - Oral Diet:  Cardiac    Routes of Feeding: Oral  Liquids: Thin Liquids  Daily Fluid Restriction: no  Last Modified Barium Swallow with Video (Video Swallowing Test): not done    Treatments at the Time of Hospital Discharge:   Respiratory Treatments: none  Oxygen Therapy:  is not on home oxygen therapy. Ventilator:    - No ventilator support    Rehab Therapies: Physical Therapy and Occupational Therapy  Weight Bearing Status/Restrictions: No weight bearing restirctions  Other Medical Equipment (for information only, NOT a DME order):  walker  Other Treatments: none    Patient's personal belongings (please select all that are sent with patient):  clothing, personal belongings    RN SIGNATURE:  Electronically signed by Egbert Cockayne, RN on 4/20/21 at 2:03 PM EDT    CASE MANAGEMENT/SOCIAL WORK SECTION    Inpatient Status Date: 04/18/2021    Readmission Risk Assessment Score:  Readmission Risk              Risk of Unplanned Readmission:        15           Discharging to Facility/ Agency   · Name: [address]  · Address: [address] [place] [address]  · Phone: [phone]  · Fax: [phone]    / signature: Electronically signed by TARA Raines on 4/20/2021 at 9:23 AM      PHYSICIAN SECTION    Prognosis: Fair    Condition at Discharge: Stable    Rehab Potential (if transferring to Rehab): Fair    Recommended Labs or Other Treatments After Discharge: PT, OT,     Physician Certification: I certify the above information and transfer of Felton Wilkes  is necessary for the continuing treatment of the diagnosis listed and that she requires [facility] for less 30 days.      Update Admission H&P: No change in H&P see dc summary    PHYSICIAN SIGNATURE:  Electronically signed by Elizabeth Roberto DO on 4/20/21 at 2:59 PM EDT

## 2021-04-20 NOTE — PLAN OF CARE
Problem: Falls - Risk of: bed alarm in place and call light within reach  Goal: Will remain free from falls  Description: Will remain free from falls  4/20/2021 0340 by Devonte Smith RN  Outcome: Ongoing  4/19/2021 1909 by Cecy Banuelos RN  Outcome: Ongoing  Goal: Absence of physical injury  Description: Absence of physical injury  4/20/2021 0340 by Devonte Smith RN  Outcome: Ongoing  4/19/2021 1909 by Cecy Banuelos RN  Outcome: Ongoing     Problem: Urinary Elimination:  Goal: Signs and symptoms of infection will decrease  Description: Signs and symptoms of infection will decrease  4/20/2021 0340 by Devonte Smith RN  Outcome: Ongoing  4/19/2021 1909 by Cecy Banuelos RN  Outcome: Ongoing     Problem: Pain:  Goal: Control of acute pain  Description: Control of acute pain  4/20/2021 0340 by Devonte Smith RN  Outcome: Ongoing  4/19/2021 1909 by Cecy Banuelos RN  Outcome: Ongoing     Problem: Discharge Planning:  Goal: Discharged to appropriate level of care  Description: Discharged to appropriate level of care  4/20/2021 0340 by Devonte Smith RN  Outcome: Ongoing  4/19/2021 1909 by Cecy Banuelos RN  Outcome: Ongoing     Problem: Skin Integrity:  Goal: Will show no infection signs and symptoms  Description: Will show no infection signs and symptoms  4/20/2021 0340 by Devonte Smith RN  Outcome: Ongoing  4/19/2021 1909 by Cecy Banuelos RN  Outcome: Ongoing  Goal: Absence of new skin breakdown  Description: Absence of new skin breakdown  4/20/2021 0340 by Devonte Smith RN  Outcome: Ongoing  4/19/2021 1909 by Cecy Banuelos RN  Outcome: Ongoing

## 2021-04-20 NOTE — DISCHARGE SUMMARY
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      Hypertension  Continue home medications     Hyperlipidemia  Continue home statin    Dementia, early stage  - likely vascular dementia, with a possible element of sdat possible, vs other forms of dementia  - mri brain ordered for progressive cognitive impairment  - will start aricept  - She should have an put pt marcel psych or neurology work up, when he acute medical issues resolve.      Bilateral patchy opacities  Stable on room air  Rapid Covid negative  PCR Covid sent: neg  Contact and droplet isolation: stop    Disposition: To a non-[facility] facility    Exam:     /74   Pulse 66   Temp 97.8 °F (36.6 °C) (Oral)   Resp 16   Ht 5' (1.524 m)   Wt 161 lb 9.6 oz (73.3 kg)   SpO2 92%   BMI 31.56 kg/m²     General appearance:  No apparent distress, appears comfortable  HEENT:  Normal cephalic, atraumatic without obvious deformity. Pupils equal, round, and reactive to light. Extra ocular muscles intact. Conjunctivae/corneas clear. Neck: Supple, with full range of motion. No jugular venous distention. Trachea midline. Respiratory:  Normal respiratory effort. Clear to auscultation, bilaterally without Rales/Wheezes/Rhonchi. Cardiovascular:  Regular rate and rhythm with normal S1/S2 without murmurs, rubs or gallops. Abdomen: Soft, non-tender, non-distended with normal bowel sounds. Musculoskeletal:  No clubbing, cyanosis or edema bilaterally. Skin: Skin color, texture, turgor normal.  No rashes or lesions. Neurologic:  Neurovascularly intact without any focal sensory/motor deficits. Cranial nerves: II-XII intact, grossly non-focal.  Psychiatric:  Alert and oriented to name, place, year and month. no agitation    Consults:     IP CONSULT TO SOCIAL WORK  IP CONSULT TO NEUROLOGY    Labs:  For convenience and continuity at follow-up the following most recent labs are provided:    Lab Results   Component Value Date WBC 6.6 04/20/2021    HGB 9.5 04/20/2021    HCT 30.8 04/20/2021    MCV 62.5 04/20/2021     04/20/2021     04/20/2021    K 3.8 04/20/2021     04/20/2021    CO2 20 04/20/2021    BUN 22 04/20/2021    CREATININE 0.9 04/20/2021    CALCIUM 8.4 04/20/2021    PHOS 3.7 01/20/2020    ALKPHOS 74 04/20/2021    ALT 8 04/20/2021    AST 15 04/20/2021    BILITOT <0.2 04/20/2021    BILIDIR <0.2 04/18/2021    LABALBU 3.3 04/20/2021    LDLCALC 125 05/23/2017    TRIG 153 05/23/2017     Lab Results   Component Value Date    INR 1.05 02/04/2018    INR 0.96 06/26/2017    INR 1.00 05/22/2017       Radiology:  CT CHEST ABDOMEN PELVIS WO CONTRAST   Final Result   Hazy ground-glass opacities scattered in both lungs which could represent   early infiltrates from pneumonia vs atelectasis or scarring. This pattern of   infiltrates has been described with COVID-19 disease and recommend   correlating with lab values and respiratory precautions. Left pacemaker in good position. Mildly dilated and atherosclerotic thoracic aorta with no aneurysm. 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 Moderate hiatal hernia with no mediastinal mass or adenopathy      No acute intra-abdominal abnormality. Probable small perihilar cysts left kidney with no hydronephrosis or renal   stones. Suggest ultrasound follow-up      Status post hysterectomy with no pelvic mass. Scattered diverticula along the sigmoid colon with no pericolonic inflammation         CT HEAD WO CONTRAST   Final Result   1. No acute intracranial abnormality. 2. Moderate cerebral white matter chronic microvascular ischemic disease. 3. Right parietal temporal lobe remote infarction. 4. Bilateral basal ganglia multifocal remote lacunar infarcts, as discussed   above. 5. 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              Discharge Medications: Current Discharge Medication List        Current Discharge Medication List        Current Discharge Medication List      CONTINUE these medications which have NOT CHANGED    Details   vitamin B-12 1000 MCG tablet Take 1 tablet by mouth daily  Qty: 30 tablet, Refills: 0      levETIRAcetam (KEPPRA) 250 MG tablet Take 1 tablet by mouth 2 times daily  Qty: 60 tablet, Refills: 3      balsalazide (COLAZAL) 750 MG capsule Take 3 capsules by mouth 3 times daily  Qty: 270 capsule, Refills: 0      pantoprazole (PROTONIX) 40 MG tablet Take 1 tablet by mouth 2 times daily (before meals)  Qty: 180 tablet, Refills: 1      lisinopril (PRINIVIL;ZESTRIL) 20 MG tablet Take 20 mg by mouth daily. clopidogrel (PLAVIX) 75 MG tablet Take 75 mg by mouth daily. metoprolol (TOPROL-XL) 25 MG XL tablet Take 25 mg by mouth daily. atorvastatin (LIPITOR) 40 MG tablet Take 40 mg by mouth daily. Current Discharge Medication List          Follow-up appointments:  one week    Provider Follow-up:    pcp    Condition at Discharge:  Stable    The patient was seen and examined on day of discharge and this discharge summary is in conjunction with any daily progress note from day of discharge. Time Spent on discharge is 45 minutes  in the examination, evaluation, counseling and review of medications and discharge plan. Signed:    Valdemar Naik DO   4/20/2021      Thank you [address] for the opportunity to be involved in this patient's care. If you have any questions or concerns please feel free to contact me at [phone].

## 2021-04-20 NOTE — CARE COORDINATION
SW received phone call back from Senait Mayfield at [facility] regarding this patient. She stated that they can take her for short term rehab at discharge but was speaking with patient's son about more long term needs. SW spoke to son via telephone and he explained that he has access to her financial info and could assist with long term planning. SW transferred him to her at [phone] so they could begin the process of discussing this. SW and family to follow up when patient is closer to discharge. Respectfully submitted,    TARA Miranda  [facility]   [phone]    Electronically signed by TARA Vargas on 4/20/2021 at 9:22 AM

## 2021-04-20 NOTE — CARE COORDINATION
SW placed phone call to patient's son upon request today at [phone], He stated that he would like a neurology and a psych consult prior to discharge. He stated that he was hoping to hear from someone after yesterday regarding this but she has a new MD assigned and working with her today. He also stated that he spoke to someone from [facility] and they stated that insurance may not cover the SNF stay. AMBER informed that we left a message and haven't heard anything back as of yet. SW left message for MD and will follow up once we hear back. Respectfully submitted,    TARA Rice  [facility]   [phone]    Electronically signed by Karyle Milling, LISW-S on 4/20/2021 at 3:22 PM

## 2021-04-20 NOTE — PROGRESS NOTES
Pt A&Ox4 at this time, pleasant demeanor, denies pain at this time, [place], reads lips. LS CTA, bowels sounds hyperactive, BM 4/20/2021, loose stools. OT bedside. Bed in lowest position, call light in reach, nonskid footwear on pt, steady gait, use of walker in room, bed alarm in place, possessions in reach, hourly rounding in place.  Electronically signed by Danita Mathias RN on 4/20/2021 at 9:40 AM

## 2021-04-20 NOTE — CARE COORDINATION
SW placed phone call back to son to explain that d/c order will remain in place, however, we are still waiting on pre-cert from [facility]. SW informed that we need updated therapy notes but therapy was gone for the day. SW informed that we left a message for them and they will likely see the patient in the morning hours. SW informed that we would be in touch in the morning once therapy notes are updated or we receive a message back from [facility] regarding her SNF stay. Respectfully submitted,    TARA Rice  [facility]   [phone]    Electronically signed by Karyle Milling, LISW-S on 4/20/2021 at 4:54 PM

## 2021-04-20 NOTE — CARE COORDINATION
SW placed phone call to [employer] today and spoke to [facility] in admissions who is the regular liaison. Her number is [phone]. She stated that she's been in meetings all day. She stated that the patient's son came in today and met with Kd Slaughter. He stated that the patient sent [place]40,000 [place] to someone she did not know. She stated that medicaid would want to know answers about this funding. She stated that the patient has approximately $5000 left according to the son and can't afford to pay for long term care. She may not be eligible for medicaid to pay for long term care. We are waiting on insurance to make a decision about pre-cert. Patient also needs updated therapy notes. SW left a message for PT/OT as they are gone for the day. Respectfully submitted,    TARA Caldera  [facility]   [phone]    Electronically signed by TARA Lopez on 4/20/2021 at 4:19 PM

## 2021-04-20 NOTE — DISCHARGE INSTR - DIET

## 2021-04-20 NOTE — PLAN OF CARE
Problem: Falls - Risk of:  Goal: Will remain free from falls  Description: Will remain free from falls  4/20/2021 1135 by Veto Alexis RN  Outcome: Ongoing   Bed in lowest position, call light in reach, telecam in place, nonskid footwear on pt, bed/chair alarm in place, hourly rounding in place. Problem: Falls - Risk of:  Goal: Absence of physical injury  Description: Absence of physical injury  4/20/2021 1135 by Veto Alexis RN  Outcome: Ongoing     Problem: Urinary Elimination:  Goal: Signs and symptoms of infection will decrease  Description: Signs and symptoms of infection will decrease  4/20/2021 1135 by Veto Alexis RN  Outcome: Ongoing   Educated on s/s of infection, proper hygiene. Problem: Pain:  Goal: Control of acute pain  Description: Control of acute pain  4/20/2021 1135 by Veto Alexis RN  Outcome: Ongoing     Problem: Discharge Planning:  Goal: Discharged to appropriate level of care  Description: Discharged to appropriate level of care  4/20/2021 1135 by Veto Alexis RN  Outcome: Ongoing     Problem: Skin Integrity:  Goal: Will show no infection signs and symptoms  Description: Will show no infection signs and symptoms  4/20/2021 1135 by Veto Alexis RN  Outcome: Ongoing   Educated on infection control measures. Problem: Skin Integrity:  Goal: Absence of new skin breakdown  Description: Absence of new skin breakdown  4/20/2021 1135 by Veto Alexis RN  Outcome: Ongoing   Educated on turning every two hours while in bed.

## 2021-04-20 NOTE — DISCHARGE SUMMARY
Hospital Medicine Discharge Summary    Patient: Felton Wilkes     Gender: female  : 1945   Age: 77 y.o. MRN: [id number]    Code Status: Full Code     Primary Care Provider: Milad Mchugh Date: 2021   Discharge Date:   2021  Admitting Physician: Grazyna Scott MD  Discharge Physician: Reji Sagastume DO     Discharge Diagnoses: Active Hospital Problems    Diagnosis Date Noted    UTI (urinary tract infection) [N39.0] 2021    Suspected COVID-19 virus infection [Z20.822] 2021    Falls frequently [R29.6] 2021    Acute encephalopathy [G93.40] 2020    UC (ulcerative colitis) ([facility] [address] [K51.90] 2011    Pacemaker [Z95.0] 2011    CAD (coronary artery disease) [I25.10] 2011    HTN (hypertension) [I10] 2011    Hypercholesteremia [E78.00] 2011       Hospital Course:  a 80-year-old female with a past medical history of CAD, hyperlipidemia, hypertension, ulcerative colitis and a pacemaker who presented to the hospital after being found down on the ground confused. Osito Nolasco has early onset dementia per family. Warner Ornelas the emergency department patient was found covered in fecal matter in urine.  She was found to have a urinary tract infection and started on broad-spectrum IV antibiotics. Her son wants to get her to snf, and is considering long term care. She is to go to [facility] at discharge.     Work up completed, and improved with treatment as below. was discharged today in stable condition.      Urinary tract infection  Received 3 days of Rocephin, discharge with cefdinir for 5 days total  Urine cx ngtd     Acute metabolic encephalopathy - improving  Likely secondary to UTI in the setting of dementia     Hypertension  Continue home medications     Hyperlipidemia  Continue home statin     Bilateral patchy opacities  Rapid Covid negative  PCR Covid sent: neg  rocehin -> cefdinir    Disposition:   To a non-[facility] facility Exam:     /74   Pulse 66   Temp 97.8 °F (36.6 °C) (Oral)   Resp 16   Ht 5' (1.524 m)   Wt 161 lb 9.6 oz (73.3 kg)   SpO2 92%   BMI 31.56 kg/m²     General appearance:  No apparent distress, appears comfortable, but weak  HEENT:  Normal cephalic, atraumatic without obvious deformity. Pupils equal, round, and reactive to light. Extra ocular muscles intact. Conjunctivae/corneas clear. Neck: Supple, with full range of motion. No jugular venous distention. Trachea midline. Respiratory:  Normal respiratory effort. Clear to auscultation, bilaterally without Rales/Wheezes/Rhonchi. Cardiovascular:  Regular rate and rhythm with normal S1/S2 without murmurs, rubs or gallops. Abdomen: Soft, non-tender, non-distended with normal bowel sounds. Musculoskeletal:  No clubbing, cyanosis or edema bilaterally. Skin: Skin color, texture, turgor normal.  No rashes or lesions. Neurologic:  Neurovascularly intact without any focal sensory/motor deficits. Cranial nerves: II-XII intact, grossly non-focal.  Psychiatric:  Alert and oriented to name, place, month, year, thought content appropriate    Consults:     IP CONSULT TO SOCIAL WORK  IP CONSULT TO NEUROLOGY    Labs:  For convenience and continuity at follow-up the following most recent labs are provided:    Lab Results   Component Value Date    WBC 6.6 04/20/2021    HGB 9.5 04/20/2021    HCT 30.8 04/20/2021    MCV 62.5 04/20/2021     04/20/2021     04/20/2021    K 3.8 04/20/2021     04/20/2021    CO2 20 04/20/2021    BUN 22 04/20/2021    CREATININE 0.9 04/20/2021    CALCIUM 8.4 04/20/2021    PHOS 3.7 01/20/2020    ALKPHOS 74 04/20/2021    ALT 8 04/20/2021    AST 15 04/20/2021    BILITOT <0.2 04/20/2021    BILIDIR <0.2 04/18/2021    LABALBU 3.3 04/20/2021    LDLCALC 125 05/23/2017    TRIG 153 05/23/2017     Lab Results   Component Value Date    INR 1.05 02/04/2018    INR 0.96 06/26/2017    INR 1.00 05/22/2017       Radiology:  CT CHEST ABDOMEN PELVIS WO CONTRAST   Final Result   Hazy ground-glass opacities scattered in both lungs which could represent   early infiltrates from pneumonia vs atelectasis or scarring. This pattern of   infiltrates has been described with COVID-19 disease and recommend   correlating with lab values and respiratory precautions. Left pacemaker in good position. Mildly dilated and atherosclerotic thoracic aorta with no aneurysm. 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 Moderate hiatal hernia with no mediastinal mass or adenopathy      No acute intra-abdominal abnormality. Probable small perihilar cysts left kidney with no hydronephrosis or renal   stones. Suggest ultrasound follow-up      Status post hysterectomy with no pelvic mass. Scattered diverticula along the sigmoid colon with no pericolonic inflammation         CT HEAD WO CONTRAST   Final Result   1. No acute intracranial abnormality. 2. Moderate cerebral white matter chronic microvascular ischemic disease. 3. Right parietal temporal lobe remote infarction. 4. Bilateral basal ganglia multifocal remote lacunar infarcts, as discussed   above. 5. 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              Discharge Medications:   Current Discharge Medication List      START taking these medications    Details   cephALEXin (KEFLEX) 500 MG capsule Take 1 capsule by mouth 3 times daily for 2 days  Qty: 6 capsule, Refills: 0           Current Discharge Medication List        Current Discharge Medication List      CONTINUE these medications which have NOT CHANGED    Details   vitamin B-12 1000 MCG tablet Take 1 tablet by mouth daily  Qty: 30 tablet, Refills: 0      levETIRAcetam (KEPPRA) 250 MG tablet Take 1 tablet by mouth 2 times daily  Qty: 60 tablet, Refills: 3      balsalazide (COLAZAL) 750 MG capsule Take 3 capsules by mouth 3 times daily Qty: 270 capsule, Refills: 0      pantoprazole (PROTONIX) 40 MG tablet Take 1 tablet by mouth 2 times daily (before meals)  Qty: 180 tablet, Refills: 1      lisinopril (PRINIVIL;ZESTRIL) 20 MG tablet Take 20 mg by mouth daily. clopidogrel (PLAVIX) 75 MG tablet Take 75 mg by mouth daily. metoprolol (TOPROL-XL) 25 MG XL tablet Take 25 mg by mouth daily. atorvastatin (LIPITOR) 40 MG tablet Take 40 mg by mouth daily. Current Discharge Medication List          Follow-up appointments:  one week    Provider Follow-up:    pcp    Condition at Discharge:  Stable    The patient was seen and examined on day of discharge and this discharge summary is in conjunction with any daily progress note from day of discharge. Time Spent on discharge is 45 minutes  in the examination, evaluation, counseling and review of medications and discharge plan. Signed:    Smitha Albarado DO   4/20/2021      Thank you [facility] for the opportunity to be involved in this patient's care. If you have any questions or concerns please feel free to contact me at [phone].

## 2021-04-20 NOTE — CARE COORDINATION
SW placed phone call to Nadira Fatima at [facility] at [phone] to notify that we have a discharge order.      Respectfully submitted,     TARA Cadena  [facility]   [phone]      Electronically signed by TARA Hernandez on 4/20/2021 at 2:42 PM

## 2021-04-20 NOTE — PROGRESS NOTES
Occupational Therapy  Facility/Department: [address] MED SURG  Daily Treatment Note  NAME: Jaelyn Ramos  : 1945  MRN: [id number]    Date of Service: 2021    Discharge Recommendations:  24 hour supervision or assist, Home with Home health OT, Home with assist PRN  OT Equipment Recommendations  Equipment Needed: No    Jaelyn Ramos scored a 17/24 on the AM-PAC ADL Inpatient form. Current research shows that an AM-PAC score of 18 or greater is typically associated with a discharge to the patient's home setting. Based on the patient's AM-PAC score, and their current ADL deficits, it is recommended that the patient have 2-3 sessions per week of Occupational Therapy at d/c to increase the patient's independence. At this time, this patient demonstrates the endurance and safety to discharge home with [facility] and a follow up treatment frequency of 2-3x/wk. Please see assessment section for further patient specific details. If patient discharges prior to next session this note will serve as a discharge summary. Please see below for the latest assessment towards goals. Assessment   Performance deficits / Impairments: Decreased functional mobility ; Decreased ADL status; Decreased endurance;Decreased cognition;Decreased balance;Decreased safe awareness;Decreased high-level IADLs  Assessment: Pt is a 77 y.o. female admitted with falls, UTI, encephalopathy, COVID r/o. Pt reports lives with son and independent ADLs and fxl mobility. Pt poor historian and gives conflicting report from H&P, per H&P pt lives alone and son checks on her. Pt currently functioning below baseline d/t the above deficits. Today, pt continues to require SBA/CGA fxl transfers, and CGA fxl mobility with no AD. Progressed to SBA for standing grooming x7 min, toileting SBA/CGA, and UB dressing set up at EOB. Pt continues to be limited by cognition and slow processing.  Will cont to see on acute to address the above limitations and maximize pt's safety and independence. Anticipate pt will progress to be able to return home with initial 24/7 supervision, PRN assist from son, and home OT. Prognosis: Good  OT Education: OT Role;Plan of Care;Transfer Training  Barriers to Learning: cognition, hearing  REQUIRES OT FOLLOW UP: Yes  Activity Tolerance  Activity Tolerance: Treatment limited secondary to decreased cognition  Safety Devices  Safety Devices in place: Yes  Type of devices: Call light within reach; Chair alarm in place; Left in chair;Gait belt;Nurse notified; Patient at risk for falls       Patient Diagnosis(es): The primary encounter diagnosis was Altered mental status, unspecified altered mental status type. Diagnoses of Acute cystitis without hematuria and Pneumonia due to organism were also pertinent to this visit. has a past medical history of Acute MI ([facility] [address] Eczema, [place] (hard of hearing), Hypercholesteremia, Hypertension, Pacemaker, Ulcerative colitis, and Wears hearing aid. has a past surgical history that includes Hysterectomy (1996); Pacemaker insertion; Coronary angioplasty with stent; Ankle fracture surgery (7/26/11); rhinoplasty; Upper gastrointestinal endoscopy (N/A, 1/20/2020); and Colonoscopy (N/A, 1/20/2020). Restrictions  Restrictions/Precautions  Restrictions/Precautions: Contact Precautions, Isolation  Position Activity Restriction  Other position/activity restrictions: RA    Subjective   General  Chart Reviewed: Yes  Patient assessed for rehabilitation services?: Yes  Additional Pertinent Hx: Per H&P: \"66 y.o. female with PMHx of myocardial infarction, hypercholesterolemia, hypertension, ulcerative colitis, coronary artery disease with stent placement, pacemaker presented to [facility] emergency department via EMS for reports of found on ground and confused. Patient reportedly has early dementia although I cannot find this in the chart.   The history of present illness is mainly taken from the EMS report and ED physician and Cognitive Status: Exceptions  Following Commands: Follows one step commands with increased time; Follows one step commands with repetition  Attention Span: Attends with cues to redirect  Safety Judgement: Decreased awareness of need for safety;Decreased awareness of need for assistance  Problem Solving: Decreased awareness of errors;Assistance required to identify errors made;Assistance required to generate solutions  Insights: Decreased awareness of deficits  Initiation: Requires cues for some  Sequencing: Requires cues for some                                         Plan   Plan  Times per week: 3-5  Current Treatment Recommendations: Functional Mobility Training, Balance Training, Strengthening, Endurance Training, Safety Education & Training, Self-Care / ADL, Cognitive/Perceptual Training, Cognitive Reorientation    AM-PAC Score        AM-[facility] Inpatient Daily Activity Raw Score: 17 (04/20/21 1022)  AM-PAC Inpatient ADL T-Scale Score : 37.26 (04/20/21 1022)  ADL Inpatient CMS 0-100% Score: 50.11 (04/20/21 1022)  ADL Inpatient CMS G-Code Modifier : CK (04/20/21 1022)    Goals  Short term goals  Time Frame for Short term goals: prior to d/c:  Short term goal 1: Pt will bathe with supervision. Short term goal 2: Pt will dress with supervision.- UB met 4/20  Short term goal 3: Pt will toilet with supervision. -progressing 4/20  Short term goal 4: Pt will complete fxl mobility and fxl transfers to/from ADL surfaces independently. Short term goal 5: Further assess pt's cognitive status with efforts to improve pt's orientation and problem solving. Long term goals  Time Frame for Long term goals : STGs=LTGs  Patient Goals   Patient goals : pt unable to verbalize personal therapy goal d/t decreased cognition.        Therapy Time   Individual Concurrent Group Co-treatment   Time In 0930         Time Out 1020         Minutes 50         Timed Code Treatment Minutes: 1000 [facility] ISAK Sullivan, OTR/L

## 2021-04-21 VITALS
SYSTOLIC BLOOD PRESSURE: 143 MMHG | BODY MASS INDEX: 32.29 KG/M2 | OXYGEN SATURATION: 96 % | RESPIRATION RATE: 15 BRPM | HEART RATE: 62 BPM | DIASTOLIC BLOOD PRESSURE: 81 MMHG | WEIGHT: 164.46 LBS | TEMPERATURE: 97.7 F | HEIGHT: 60 IN

## 2021-04-21 LAB
A/G RATIO: 1 (ref 1.1–2.2)
ALBUMIN SERPL-MCNC: 3.2 G/DL (ref 3.4–5)
ALP BLD-CCNC: 78 U/L (ref 40–129)
ALT SERPL-CCNC: 7 U/L (ref 10–40)
ANION GAP SERPL CALCULATED.3IONS-SCNC: 11 MMOL/L (ref 3–16)
AST SERPL-CCNC: 14 U/L (ref 15–37)
BASOPHILS ABSOLUTE: 0 K/UL (ref 0–0.2)
BASOPHILS RELATIVE PERCENT: 0.5 %
BILIRUB SERPL-MCNC: <0.2 MG/DL (ref 0–1)
BUN BLDV-MCNC: 20 MG/DL (ref 7–20)
CALCIUM SERPL-MCNC: 8.2 MG/DL (ref 8.3–10.6)
CHLORIDE BLD-SCNC: 110 MMOL/L (ref 99–110)
CO2: 18 MMOL/L (ref 21–32)
CREAT SERPL-MCNC: 0.7 MG/DL (ref 0.6–1.2)
EOSINOPHILS ABSOLUTE: 0.2 K/UL (ref 0–0.6)
EOSINOPHILS RELATIVE PERCENT: 3 %
GFR AFRICAN AMERICAN: >60
GFR NON-AFRICAN AMERICAN: >60
GLOBULIN: 3.2 G/DL
GLUCOSE BLD-MCNC: 88 MG/DL (ref 70–99)
HCT VFR BLD CALC: 30.4 % (ref 36–48)
HEMATOLOGY PATH CONSULT: NO
HEMOGLOBIN: 9.5 G/DL (ref 12–16)
LYMPHOCYTES ABSOLUTE: 2.2 K/UL (ref 1–5.1)
LYMPHOCYTES RELATIVE PERCENT: 28.1 %
MCH RBC QN AUTO: 19.7 PG (ref 26–34)
MCHC RBC AUTO-ENTMCNC: 31.3 G/DL (ref 31–36)
MCV RBC AUTO: 62.8 FL (ref 80–100)
MONOCYTES ABSOLUTE: 0.8 K/UL (ref 0–1.3)
MONOCYTES RELATIVE PERCENT: 9.7 %
NEUTROPHILS ABSOLUTE: 4.7 K/UL (ref 1.7–7.7)
NEUTROPHILS RELATIVE PERCENT: 58.7 %
PDW BLD-RTO: 19.8 % (ref 12.4–15.4)
PLATELET # BLD: 294 K/UL (ref 135–450)
PMV BLD AUTO: 8.2 FL (ref 5–10.5)
POTASSIUM REFLEX MAGNESIUM: 3.9 MMOL/L (ref 3.5–5.1)
RBC # BLD: 4.85 M/UL (ref 4–5.2)
SODIUM BLD-SCNC: 139 MMOL/L (ref 136–145)
TOTAL PROTEIN: 6.4 G/DL (ref 6.4–8.2)
WBC # BLD: 8 K/UL (ref 4–11)

## 2021-04-21 PROCEDURE — 6360000002 HC RX W HCPCS: Performed by: NURSE PRACTITIONER

## 2021-04-21 PROCEDURE — 94761 N-INVAS EAR/PLS OXIMETRY MLT: CPT

## 2021-04-21 PROCEDURE — 97530 THERAPEUTIC ACTIVITIES: CPT

## 2021-04-21 PROCEDURE — 6370000000 HC RX 637 (ALT 250 FOR IP): Performed by: NURSE PRACTITIONER

## 2021-04-21 PROCEDURE — 80053 COMPREHEN METABOLIC PANEL: CPT

## 2021-04-21 PROCEDURE — 6360000002 HC RX W HCPCS: Performed by: INTERNAL MEDICINE

## 2021-04-21 PROCEDURE — 2580000003 HC RX 258: Performed by: NURSE PRACTITIONER

## 2021-04-21 PROCEDURE — 85025 COMPLETE CBC W/AUTO DIFF WBC: CPT

## 2021-04-21 PROCEDURE — 36415 COLL VENOUS BLD VENIPUNCTURE: CPT

## 2021-04-21 PROCEDURE — 2580000003 HC RX 258: Performed by: INTERNAL MEDICINE

## 2021-04-21 RX ADMIN — ENOXAPARIN SODIUM 40 MG: 40 INJECTION SUBCUTANEOUS at 09:11

## 2021-04-21 RX ADMIN — CLOPIDOGREL BISULFATE 75 MG: 75 TABLET ORAL at 09:11

## 2021-04-21 RX ADMIN — SODIUM CHLORIDE, PRESERVATIVE FREE 10 ML: 5 INJECTION INTRAVENOUS at 09:12

## 2021-04-21 RX ADMIN — CEFTRIAXONE 1000 MG: 1 INJECTION, POWDER, FOR SOLUTION INTRAMUSCULAR; INTRAVENOUS at 09:12

## 2021-04-21 RX ADMIN — LISINOPRIL 20 MG: 20 TABLET ORAL at 09:11

## 2021-04-21 RX ADMIN — PANTOPRAZOLE SODIUM 40 MG: 40 TABLET, DELAYED RELEASE ORAL at 06:06

## 2021-04-21 RX ADMIN — METOPROLOL SUCCINATE 25 MG: 25 TABLET, EXTENDED RELEASE ORAL at 09:11

## 2021-04-21 RX ADMIN — LEVETIRACETAM 250 MG: 500 TABLET ORAL at 09:12

## 2021-04-21 RX ADMIN — ATORVASTATIN CALCIUM 40 MG: 40 TABLET, FILM COATED ORAL at 09:11

## 2021-04-21 NOTE — CARE COORDINATION
Discharge Recommendation: Rehab.    0 goals met today. PT goals appropriate.    Courtney Meier PT, DPT  2020  Pager: [phone]        Problem: Physical Therapy Goal  Goal: Physical Therapy Goal  Description  Goals to be met by: 20    Patient will increase functional independence with mobility by performin. Supine to sit with Contact Guard Assistance -not met  2. Sit to stand transfer with Contact Guard Assistance -not met  3. Gait  x 150 feet with Contact Guard Assistance -not met  4. Ascend/descend 5 stair with left Handrails Contact Guard Assistance -not met      Outcome: Ongoing, Progressing      Sw received voice message from Yelena Bejarano with [facility] ([phone]) requesting a return call. Sw placed call and left message with Yelena Bejarano. Awaiting response. TARA Gomez, Social Work  [phone]    Electronically signed by ROSIO Berrios LSW on 4/21/2021 at 12:07 PM    Sw received call from Yelena Bejarano with [facility] who reports they have obtained precert auth for SNF stay. They have spoken with patient's son and received a verbal agreement with him on the plan of care. They can accept patient today. Sw call to patient's son, Gerald Duran, to discuss discharge plan. He is in agreement with plan and will be able to transport his mother to SNF today. Plan: Sw to meet with pt in room to discuss discharge. TARA Gomez, Social Work  [phone]    Electronically signed by ROSIO Berrios LSW on 4/21/2021 at 1:26 PM    Met with patient who confirms plan to go to [facility]. Informed her that her son Gerald Duran will transport her to facility. Sw call to Gerald Duran to confirm plan. He will come to transport around 4 pm today. Sw call to [facility], left message with Yelena Bejarano to update with discharge plan. Covid test from 4/19 accepted.      TARA Gomez, Social Work  [phone]    Electronically signed by ROSIO Berrios LSW on 4/21/2021 at 1:43 PM

## 2021-04-21 NOTE — CARE COORDINATION
CASE MANAGEMENT DISCHARGE SUMMARY:    DISCHARGE DATE: 4/21/2021    DISCHARGED TO: Miriam Kelly                REPORT NUMBER: [id number]               FAX NUMBER: [phone]    TRANSPORTATION: Son to transport to SNF             TIME: 4 pm    INSURANCE PRECERT OBTAINED: [place] precert obtained today per SNF     HENS/PASAAR COMPLETED: yes    RN, family, SNF, and patient notified     Nicoletta Kocher MSW, YANELISW-S, Social Work  [phone]    Electronically signed by Nicoletta Kocher, MSW, LSW on 4/21/2021 at 1:48 PM

## 2021-04-21 NOTE — DISCHARGE SUMMARY
of left paramedian pontine elongated 12 mm diameter   remote lacunar infarct.      Hypertension  Continue home medications     Hyperlipidemia  Continue home statin    Dementia, early stage  - likely vascular dementia, with a possible element of sdat possible, vs other forms of dementia  - mri brain ordered for progressive cognitive impairment  - will start aricept  - She should have an put pt marcel psych or neurology work up, when he acute medical issues resolve.      Bilateral patchy opacities  Stable on room air  Rapid Covid negative  PCR Covid sent: neg  Contact and droplet isolation: stop    Disposition: To a non-[facility] facility    Exam:     /73   Pulse 62   Temp 97.6 °F (36.4 °C) (Oral)   Resp 16   Ht 5' (1.524 m)   Wt 164 lb 7.4 oz (74.6 kg)   SpO2 92%   BMI 32.12 kg/m²     General appearance:  No apparent distress, appears comfortable  HEENT:  Normal cephalic, atraumatic without obvious deformity. Pupils equal, round, and reactive to light. Extra ocular muscles intact. Conjunctivae/corneas clear. Neck: Supple, with full range of motion. No jugular venous distention. Trachea midline. Respiratory:  Normal respiratory effort. Clear to auscultation, bilaterally without Rales/Wheezes/Rhonchi. Cardiovascular:  Regular rate and rhythm with normal S1/S2 without murmurs, rubs or gallops. Abdomen: Soft, non-tender, non-distended with normal bowel sounds. Musculoskeletal:  No clubbing, cyanosis or edema bilaterally. Skin: Skin color, texture, turgor normal.  No rashes or lesions. Neurologic:  Neurovascularly intact without any focal sensory/motor deficits. Cranial nerves: II-XII intact, grossly non-focal.  Psychiatric:  Alert and oriented to name, place, year and month. no agitation    Consults:     IP CONSULT TO SOCIAL WORK  IP CONSULT TO NEUROLOGY    Labs:  For convenience and continuity at follow-up the following most recent labs are provided:    Lab Results   Component Value Date    WBC 8.0 04/21/2021    HGB 9.5 04/21/2021    HCT 30.4 04/21/2021    MCV 62.8 04/21/2021     04/21/2021     04/21/2021    K 3.9 04/21/2021     04/21/2021    CO2 18 04/21/2021    BUN 20 04/21/2021    CREATININE 0.7 04/21/2021    CALCIUM 8.2 04/21/2021    PHOS 3.7 01/20/2020    ALKPHOS 78 04/21/2021    ALT 7 04/21/2021    AST 14 04/21/2021    BILITOT <0.2 04/21/2021    BILIDIR <0.2 04/18/2021    LABALBU 3.2 04/21/2021    LDLCALC 125 05/23/2017    TRIG 153 05/23/2017     Lab Results   Component Value Date    INR 1.05 02/04/2018    INR 0.96 06/26/2017    INR 1.00 05/22/2017       Radiology:  CT CHEST ABDOMEN PELVIS WO CONTRAST   Final Result   Hazy ground-glass opacities scattered in both lungs which could represent   early infiltrates from pneumonia vs atelectasis or scarring. This pattern of   infiltrates has been described with COVID-19 disease and recommend   correlating with lab values and respiratory precautions. Left pacemaker in good position. Mildly dilated and atherosclerotic thoracic aorta with no aneurysm. 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 Moderate hiatal hernia with no mediastinal mass or adenopathy      No acute intra-abdominal abnormality. Probable small perihilar cysts left kidney with no hydronephrosis or renal   stones. Suggest ultrasound follow-up      Status post hysterectomy with no pelvic mass. Scattered diverticula along the sigmoid colon with no pericolonic inflammation         CT HEAD WO CONTRAST   Final Result   1. No acute intracranial abnormality. 2. Moderate cerebral white matter chronic microvascular ischemic disease. 3. Right parietal temporal lobe remote infarction. 4. Bilateral basal ganglia multifocal remote lacunar infarcts, as discussed   above. 5. 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              Discharge Medications: Current Discharge Medication List      START taking these medications    Details   cephALEXin (KEFLEX) 500 MG capsule Take 1 capsule by mouth 3 times daily for 2 days  Qty: 6 capsule, Refills: 0      donepezil (ARICEPT) 5 MG tablet Take 1 tablet by mouth nightly  Qty: 30 tablet, Refills: 3           Current Discharge Medication List        Current Discharge Medication List      CONTINUE these medications which have NOT CHANGED    Details   vitamin B-12 1000 MCG tablet Take 1 tablet by mouth daily  Qty: 30 tablet, Refills: 0      levETIRAcetam (KEPPRA) 250 MG tablet Take 1 tablet by mouth 2 times daily  Qty: 60 tablet, Refills: 3      balsalazide (COLAZAL) 750 MG capsule Take 3 capsules by mouth 3 times daily  Qty: 270 capsule, Refills: 0      pantoprazole (PROTONIX) 40 MG tablet Take 1 tablet by mouth 2 times daily (before meals)  Qty: 180 tablet, Refills: 1      lisinopril (PRINIVIL;ZESTRIL) 20 MG tablet Take 20 mg by mouth daily. clopidogrel (PLAVIX) 75 MG tablet Take 75 mg by mouth daily. metoprolol (TOPROL-XL) 25 MG XL tablet Take 25 mg by mouth daily. atorvastatin (LIPITOR) 40 MG tablet Take 40 mg by mouth daily. Current Discharge Medication List          Follow-up appointments:  one week    Provider Follow-up:    pcp    Condition at Discharge:  Stable    The patient was seen and examined on day of discharge and this discharge summary is in conjunction with any daily progress note from day of discharge. Time Spent on discharge is 45 minutes  in the examination, evaluation, counseling and review of medications and discharge plan. Signed:    Stef Mendez MD   4/21/2021      Thank you [facility] for the opportunity to be involved in this patient's care. If you have any questions or concerns please feel free to contact me at [phone].

## 2021-04-21 NOTE — PLAN OF CARE
Problem: Falls - Risk of: Bed alarm in place and call light within reach.    Goal: Will remain free from falls  Description: Will remain free from falls  4/21/2021 0104 by Thomas Mena RN  Outcome: Ongoing  4/20/2021 1135 by Polo Knight RN  Outcome: Ongoing  Goal: Absence of physical injury  Description: Absence of physical injury  4/21/2021 0104 by Thomas Mena RN  Outcome: Ongoing  4/20/2021 1135 by Polo Knight RN  Outcome: Ongoing     Problem: Urinary Elimination:  Goal: Signs and symptoms of infection will decrease  Description: Signs and symptoms of infection will decrease  4/21/2021 0104 by Thomas Mena RN  Outcome: Ongoing  4/20/2021 1135 by Polo Knight RN  Outcome: Ongoing     Problem: Pain:  Goal: Control of acute pain  Description: Control of acute pain  4/21/2021 0104 by Thomas Mena RN  Outcome: Ongoing  4/20/2021 1135 by Polo Knight RN  Outcome: Ongoing     Problem: Discharge Planning:  Goal: Discharged to appropriate level of care  Description: Discharged to appropriate level of care  4/21/2021 0104 by Thomas Mena RN  Outcome: Ongoing  4/20/2021 1135 by Polo Knight RN  Outcome: Ongoing     Problem: Skin Integrity:  Goal: Will show no infection signs and symptoms  Description: Will show no infection signs and symptoms  4/21/2021 0104 by Thomas Mena RN  Outcome: Ongoing  4/20/2021 1135 by Polo Knight RN  Outcome: Ongoing  Goal: Absence of new skin breakdown  Description: Absence of new skin breakdown  4/21/2021 0104 by Thomas Mena RN  Outcome: Ongoing  4/20/2021 1135 by Polo Knight RN  Outcome: Ongoing

## 2021-04-21 NOTE — PROGRESS NOTES
Pt discharged. Report called to April at [place], all questions answered. IV removed, no complications, dressing in place. Pt wheeled to exit per transportation. pts son driving to pt to F.

## 2021-04-21 NOTE — PROGRESS NOTES
Physical Therapy  Facility/Department: [address] MED SURG  Daily Treatment Note  NAME: Brenden Mosley  : 1945  MRN: [id number]    Date of Service: 2021    Discharge Recommendations:  Home with assist PRN, Patient would benefit from continued therapy after discharge, S Level 1, Home with Home health PT     Brenden Mosley scored a 22/24 on the AM-PAC short mobility form. Current research shows that an AM-PAC score of 18 or greater is typically associated with a discharge to the patient's home setting. Based on the patient's AM-PAC score and their current functional mobility deficits, it is recommended that the patient have 2-3 sessions per week of Physical Therapy at d/c to increase the patient's independence. At this time, this patient demonstrates the endurance and safety to discharge home with HHPT and a follow up treatment frequency of 2-3x/wk. Please see assessment section for further patient specific details. If patient discharges prior to next session this note will serve as a discharge summary. Please see below for the latest assessment towards goals. HOME HEALTH CARE: LEVEL 1 STANDARD     -Initial home health evaluation to occur within 24-48 hours, in patient home    -Home health agency to establish plan of care for patient over 60 day period    -Medication Reconciliation    -PCP Visit scheduled within seven days of discharge    -PT/OT to evaluate with goal of regaining prior level of functioning    -OT to evaluate if patient has [address] needs for personal care         Assessment   Body structures, Functions, Activity limitations: Decreased functional mobility ; Decreased ADL status  Assessment: 77 y.o. female with PMHx of myocardial infarction, hypercholesterolemia, hypertension, ulcerative colitis, coronary artery disease with stent placement, pacemaker presented to [facility] emergency department via EMS on 21 for reports of found on ground and confused.  Pt found to have UTI. Pt is living with her son, independent with all functional mobility prior to admission, not using any AD prior to admission. She does report to frequent falls at home due to dizziness. Pt currently functioning near her baseline. Anticipate home with PRN assist from son. Could benefit from level 1 HHPT for dynamic balance. Treatment Diagnosis: impaired mobility  Prognosis: Good  Decision Making: Medium Complexity  History: see below  Exam: see below  Clinical Presentation: evolving  PT Education: PT Role;Plan of Care;Transfer Training;General Safety;Gait Training;Functional Mobility Training  Barriers to Learning: cognition; [place]  REQUIRES PT FOLLOW UP: Yes  Activity Tolerance  Activity Tolerance: Patient Tolerated treatment well     Patient Diagnosis(es): The primary encounter diagnosis was Altered mental status, unspecified altered mental status type. Diagnoses of Acute cystitis without hematuria and Pneumonia due to organism were also pertinent to this visit. has a past medical history of Acute MI ([facility] [address] Eczema, [place] (hard of hearing), Hypercholesteremia, Hypertension, Pacemaker, Ulcerative colitis, and Wears hearing aid. has a past surgical history that includes Hysterectomy (1996); Pacemaker insertion; Coronary angioplasty with stent; Ankle fracture surgery (7/26/11); rhinoplasty; Upper gastrointestinal endoscopy (N/A, 1/20/2020); and Colonoscopy (N/A, 1/20/2020). Restrictions  Restrictions/Precautions  Restrictions/Precautions: Contact Precautions, Isolation  Position Activity Restriction  Other position/activity restrictions: RA     Subjective   General  Chart Reviewed: Yes  Additional Pertinent Hx: 77 y.o. female with PMHx of myocardial infarction, hypercholesterolemia, hypertension, ulcerative colitis, coronary artery disease with stent placement, pacemaker [facility]21 for reports of found on ground and confused. Pt found to have UTI.   Response To Previous Treatment: Patient with no complaints from previous session. Family / Caregiver Present: No  Referring Practitioner: ROBERT Mosqueda CNP  Subjective  Subjective: Pt is agreeable to PT. Denies pain. Reports that she has been having frequent falls due to her dizziness and her son cannot assist her upright. Orientation  Orientation  Overall Orientation Status: Impaired  Orientation Level: Oriented to place;Oriented to person;Disoriented to time;Disoriented to situation(pt knew it was a hospital)     Cognition   Cognition  Overall Cognitive Status: Exceptions  Following Commands: Follows one step commands with increased time; Follows one step commands with repetition  Attention Span: Attends with cues to redirect  Safety Judgement: Decreased awareness of need for safety;Decreased awareness of need for assistance  Problem Solving: Decreased awareness of errors;Assistance required to identify errors made;Assistance required to generate solutions  Insights: Decreased awareness of deficits  Initiation: Requires cues for some  Sequencing: Requires cues for some     Objective   Bed mobility  Supine to Sit: Supervision  Sit to Supine: Unable to assess(chair EOS)  Scooting: Supervision(to EOB)  Transfers  Sit to Stand: Stand by assistance  Stand to sit: Stand by assistance  Ambulation  Ambulation?: Yes  Ambulation 1  Surface: level tile  Device: No Device  Assistance: Stand by assistance;Contact guard assistance  Quality of Gait: deviated path at times  Gait Deviations: Slow [place];Decreased step length;Decreased step height;Deviated path  Distance: 10' + 60'  Comments: Mildly unsteady this date but self controlled.      Balance  Posture: Good  Sitting - Static: Good  Sitting - Dynamic: Good  Standing - Static: Good  Standing - Dynamic: Good;-        AM-PAC Score  AM-PAC Inpatient Mobility Raw Score : 22 (04/21/21 0904)  AM-PAC Inpatient T-Scale Score : 53.28 (04/21/21 2842)  Mobility Inpatient CMS 0-100% Score: 20.91 (04/21/21 0904)  Mobility Inpatient CMS G-Code Modifier : CJ (04/21/21 0012)          Goals  Short term goals  Time Frame for Short term goals: by acute discharge - all goals ongoing as of 4/21/21  Short term goal 1: bed mobility independently  Short term goal 2: sit<>stand independently  Short term goal 3: ambulate >50' independently  Patient Goals   Patient goals : none stated    Plan    Plan  Times per week: 2-3x/week  Current Treatment Recommendations: Functional Mobility Training, Patient/Caregiver Education & Training, Safety Education & Training, Endurance Training  Safety Devices  Type of devices:  All fall risk precautions in place, Call light within reach, Gait belt, Patient at risk for falls, Left in chair, Chair alarm in place, Nurse notified, Clive Stairs in use     Therapy Time   Individual Concurrent Group Co-treatment   Time In 0830         Time Out 0900         Minutes 30         Timed Code Treatment Minutes: 30 Minutes       Alina Moncada, PT

## 2021-05-18 PROBLEM — N39.0 UTI (URINARY TRACT INFECTION): Status: RESOLVED | Noted: 2021-04-18 | Resolved: 2021-05-18

## 2021-07-28 ENCOUNTER — HOSPITAL ENCOUNTER (INPATIENT)
Age: 76
LOS: 8 days | Discharge: NURSING, REHAB OR LONG-TERM CARE | DRG: 385 | End: 2021-08-05
Attending: EMERGENCY MEDICINE | Admitting: INTERNAL MEDICINE
Payer: MEDICARE

## 2021-07-28 ENCOUNTER — APPOINTMENT (OUTPATIENT)
Dept: CT IMAGING | Age: 76
DRG: 385 | End: 2021-07-28
Payer: MEDICARE

## 2021-07-28 DIAGNOSIS — R10.13 ABDOMINAL PAIN, EPIGASTRIC: ICD-10-CM

## 2021-07-28 DIAGNOSIS — E86.0 DEHYDRATION: ICD-10-CM

## 2021-07-28 DIAGNOSIS — R19.7 DIARRHEA, UNSPECIFIED TYPE: Primary | ICD-10-CM

## 2021-07-28 DIAGNOSIS — E87.6 HYPOKALEMIA: ICD-10-CM

## 2021-07-28 LAB
A/G RATIO: 0.9 (ref 1.1–2.2)
ALBUMIN SERPL-MCNC: 3.6 G/DL (ref 3.4–5)
ALP BLD-CCNC: 102 U/L (ref 40–129)
ALT SERPL-CCNC: <5 U/L (ref 10–40)
ANION GAP SERPL CALCULATED.3IONS-SCNC: 14 MMOL/L (ref 3–16)
AST SERPL-CCNC: 15 U/L (ref 15–37)
BACTERIA: ABNORMAL /HPF
BASOPHILS ABSOLUTE: 0.1 K/UL (ref 0–0.2)
BASOPHILS RELATIVE PERCENT: 0.7 %
BILIRUB SERPL-MCNC: 0.4 MG/DL (ref 0–1)
BILIRUBIN URINE: NEGATIVE
BLOOD, URINE: NEGATIVE
BUN BLDV-MCNC: 12 MG/DL (ref 7–20)
CALCIUM SERPL-MCNC: 8.1 MG/DL (ref 8.3–10.6)
CHLORIDE BLD-SCNC: 103 MMOL/L (ref 99–110)
CLARITY: ABNORMAL
CO2: 23 MMOL/L (ref 21–32)
COLOR: YELLOW
CREAT SERPL-MCNC: 1 MG/DL (ref 0.6–1.2)
EKG ATRIAL RATE: 79 BPM
EKG DIAGNOSIS: NORMAL
EKG P AXIS: 28 DEGREES
EKG P-R INTERVAL: 148 MS
EKG Q-T INTERVAL: 432 MS
EKG QRS DURATION: 86 MS
EKG QTC CALCULATION (BAZETT): 495 MS
EKG R AXIS: 10 DEGREES
EKG T AXIS: 131 DEGREES
EKG VENTRICULAR RATE: 79 BPM
EOSINOPHILS ABSOLUTE: 0.6 K/UL (ref 0–0.6)
EOSINOPHILS RELATIVE PERCENT: 4.8 %
EPITHELIAL CELLS, UA: 2 /HPF (ref 0–5)
GFR AFRICAN AMERICAN: >60
GFR NON-AFRICAN AMERICAN: 54
GLOBULIN: 3.8 G/DL
GLUCOSE BLD-MCNC: 139 MG/DL (ref 70–99)
GLUCOSE URINE: NEGATIVE MG/DL
HCT VFR BLD CALC: 34 % (ref 36–48)
HEMATOLOGY PATH CONSULT: NO
HEMOGLOBIN: 10.9 G/DL (ref 12–16)
HYALINE CASTS: 12 /LPF (ref 0–8)
KETONES, URINE: ABNORMAL MG/DL
LACTIC ACID: 1.3 MMOL/L (ref 0.4–2)
LEUKOCYTE ESTERASE, URINE: NEGATIVE
LIPASE: 17 U/L (ref 13–60)
LYMPHOCYTES ABSOLUTE: 1.7 K/UL (ref 1–5.1)
LYMPHOCYTES RELATIVE PERCENT: 13 %
MAGNESIUM: 1.5 MG/DL (ref 1.8–2.4)
MCH RBC QN AUTO: 20.4 PG (ref 26–34)
MCHC RBC AUTO-ENTMCNC: 32 G/DL (ref 31–36)
MCV RBC AUTO: 63.8 FL (ref 80–100)
MICROSCOPIC EXAMINATION: YES
MONOCYTES ABSOLUTE: 0.9 K/UL (ref 0–1.3)
MONOCYTES RELATIVE PERCENT: 6.9 %
NEUTROPHILS ABSOLUTE: 9.9 K/UL (ref 1.7–7.7)
NEUTROPHILS RELATIVE PERCENT: 74.6 %
NITRITE, URINE: NEGATIVE
PDW BLD-RTO: 18.9 % (ref 12.4–15.4)
PH UA: 6 (ref 5–8)
PLATELET # BLD: 344 K/UL (ref 135–450)
PMV BLD AUTO: 6.7 FL (ref 5–10.5)
POTASSIUM REFLEX MAGNESIUM: 2.5 MMOL/L (ref 3.5–5.1)
PROTEIN UA: ABNORMAL MG/DL
RBC # BLD: 5.33 M/UL (ref 4–5.2)
RBC UA: 2 /HPF (ref 0–4)
SODIUM BLD-SCNC: 140 MMOL/L (ref 136–145)
SPECIFIC GRAVITY UA: 1.02 (ref 1–1.03)
TOTAL PROTEIN: 7.4 G/DL (ref 6.4–8.2)
URINE REFLEX TO CULTURE: YES
URINE TYPE: ABNORMAL
UROBILINOGEN, URINE: 0.2 E.U./DL
WBC # BLD: 13.3 K/UL (ref 4–11)
WBC UA: 11 /HPF (ref 0–5)

## 2021-07-28 PROCEDURE — 36415 COLL VENOUS BLD VENIPUNCTURE: CPT

## 2021-07-28 PROCEDURE — 93005 ELECTROCARDIOGRAM TRACING: CPT | Performed by: EMERGENCY MEDICINE

## 2021-07-28 PROCEDURE — 6360000002 HC RX W HCPCS: Performed by: EMERGENCY MEDICINE

## 2021-07-28 PROCEDURE — 74177 CT ABD & PELVIS W/CONTRAST: CPT

## 2021-07-28 PROCEDURE — 87086 URINE CULTURE/COLONY COUNT: CPT

## 2021-07-28 PROCEDURE — 83690 ASSAY OF LIPASE: CPT

## 2021-07-28 PROCEDURE — 87186 SC STD MICRODIL/AGAR DIL: CPT

## 2021-07-28 PROCEDURE — 6370000000 HC RX 637 (ALT 250 FOR IP): Performed by: INTERNAL MEDICINE

## 2021-07-28 PROCEDURE — 2580000003 HC RX 258: Performed by: INTERNAL MEDICINE

## 2021-07-28 PROCEDURE — 93010 ELECTROCARDIOGRAM REPORT: CPT | Performed by: INTERNAL MEDICINE

## 2021-07-28 PROCEDURE — 85025 COMPLETE CBC W/AUTO DIFF WBC: CPT

## 2021-07-28 PROCEDURE — 6360000002 HC RX W HCPCS: Performed by: INTERNAL MEDICINE

## 2021-07-28 PROCEDURE — 83735 ASSAY OF MAGNESIUM: CPT

## 2021-07-28 PROCEDURE — 80053 COMPREHEN METABOLIC PANEL: CPT

## 2021-07-28 PROCEDURE — 81001 URINALYSIS AUTO W/SCOPE: CPT

## 2021-07-28 PROCEDURE — 99284 EMERGENCY DEPT VISIT MOD MDM: CPT

## 2021-07-28 PROCEDURE — 1200000000 HC SEMI PRIVATE

## 2021-07-28 PROCEDURE — 83605 ASSAY OF LACTIC ACID: CPT

## 2021-07-28 PROCEDURE — 87077 CULTURE AEROBIC IDENTIFY: CPT

## 2021-07-28 PROCEDURE — 6360000004 HC RX CONTRAST MEDICATION: Performed by: EMERGENCY MEDICINE

## 2021-07-28 RX ORDER — POLYETHYLENE GLYCOL 3350 17 G/17G
17 POWDER, FOR SOLUTION ORAL DAILY PRN
Status: DISCONTINUED | OUTPATIENT
Start: 2021-07-28 | End: 2021-08-05 | Stop reason: HOSPADM

## 2021-07-28 RX ORDER — BALSALAZIDE DISODIUM 750 MG/1
2250 CAPSULE ORAL 3 TIMES DAILY
COMMUNITY

## 2021-07-28 RX ORDER — ACETAMINOPHEN 325 MG/1
650 TABLET ORAL EVERY 6 HOURS PRN
Status: DISCONTINUED | OUTPATIENT
Start: 2021-07-28 | End: 2021-08-05 | Stop reason: HOSPADM

## 2021-07-28 RX ORDER — POTASSIUM CHLORIDE 7.45 MG/ML
10 INJECTION INTRAVENOUS PRN
Status: DISCONTINUED | OUTPATIENT
Start: 2021-07-28 | End: 2021-08-01

## 2021-07-28 RX ORDER — SODIUM CHLORIDE 9 MG/ML
25 INJECTION, SOLUTION INTRAVENOUS PRN
Status: DISCONTINUED | OUTPATIENT
Start: 2021-07-28 | End: 2021-08-05 | Stop reason: HOSPADM

## 2021-07-28 RX ORDER — PREDNISONE 20 MG/1
20 TABLET ORAL DAILY
Status: DISCONTINUED | OUTPATIENT
Start: 2021-07-28 | End: 2021-07-29

## 2021-07-28 RX ORDER — MAGNESIUM SULFATE IN WATER 40 MG/ML
2000 INJECTION, SOLUTION INTRAVENOUS PRN
Status: DISCONTINUED | OUTPATIENT
Start: 2021-07-28 | End: 2021-08-05 | Stop reason: HOSPADM

## 2021-07-28 RX ORDER — ONDANSETRON 2 MG/ML
4 INJECTION INTRAMUSCULAR; INTRAVENOUS EVERY 6 HOURS PRN
Status: DISCONTINUED | OUTPATIENT
Start: 2021-07-28 | End: 2021-08-05 | Stop reason: HOSPADM

## 2021-07-28 RX ORDER — POTASSIUM CHLORIDE 20 MEQ/1
40 TABLET, EXTENDED RELEASE ORAL PRN
Status: DISCONTINUED | OUTPATIENT
Start: 2021-07-28 | End: 2021-08-01

## 2021-07-28 RX ORDER — POTASSIUM CHLORIDE 7.45 MG/ML
10 INJECTION INTRAVENOUS ONCE
Status: COMPLETED | OUTPATIENT
Start: 2021-07-28 | End: 2021-07-28

## 2021-07-28 RX ORDER — METOPROLOL SUCCINATE 25 MG/1
25 TABLET, EXTENDED RELEASE ORAL DAILY
Status: DISCONTINUED | OUTPATIENT
Start: 2021-07-28 | End: 2021-08-05 | Stop reason: HOSPADM

## 2021-07-28 RX ORDER — ATORVASTATIN CALCIUM 40 MG/1
40 TABLET, FILM COATED ORAL DAILY
Status: DISCONTINUED | OUTPATIENT
Start: 2021-07-28 | End: 2021-08-05 | Stop reason: HOSPADM

## 2021-07-28 RX ORDER — PANTOPRAZOLE SODIUM 40 MG/1
40 TABLET, DELAYED RELEASE ORAL
Status: DISCONTINUED | OUTPATIENT
Start: 2021-07-28 | End: 2021-08-05 | Stop reason: HOSPADM

## 2021-07-28 RX ORDER — ACETAMINOPHEN 650 MG/1
650 SUPPOSITORY RECTAL EVERY 6 HOURS PRN
Status: DISCONTINUED | OUTPATIENT
Start: 2021-07-28 | End: 2021-08-05 | Stop reason: HOSPADM

## 2021-07-28 RX ORDER — SODIUM CHLORIDE 9 MG/ML
INJECTION, SOLUTION INTRAVENOUS CONTINUOUS
Status: DISCONTINUED | OUTPATIENT
Start: 2021-07-28 | End: 2021-07-30

## 2021-07-28 RX ORDER — BALSALAZIDE DISODIUM 750 MG/1
2250 CAPSULE ORAL 3 TIMES DAILY
Status: DISCONTINUED | OUTPATIENT
Start: 2021-07-28 | End: 2021-07-28 | Stop reason: CLARIF

## 2021-07-28 RX ORDER — LISINOPRIL 20 MG/1
20 TABLET ORAL DAILY
Status: DISCONTINUED | OUTPATIENT
Start: 2021-07-28 | End: 2021-08-05 | Stop reason: HOSPADM

## 2021-07-28 RX ORDER — ONDANSETRON 4 MG/1
4 TABLET, ORALLY DISINTEGRATING ORAL EVERY 8 HOURS PRN
Status: DISCONTINUED | OUTPATIENT
Start: 2021-07-28 | End: 2021-08-05 | Stop reason: HOSPADM

## 2021-07-28 RX ORDER — LEVETIRACETAM 500 MG/1
250 TABLET ORAL 2 TIMES DAILY
Status: DISCONTINUED | OUTPATIENT
Start: 2021-07-28 | End: 2021-08-05 | Stop reason: HOSPADM

## 2021-07-28 RX ORDER — DONEPEZIL HYDROCHLORIDE 5 MG/1
5 TABLET, FILM COATED ORAL NIGHTLY
Status: DISCONTINUED | OUTPATIENT
Start: 2021-07-28 | End: 2021-08-05 | Stop reason: HOSPADM

## 2021-07-28 RX ORDER — SODIUM CHLORIDE 0.9 % (FLUSH) 0.9 %
5-40 SYRINGE (ML) INJECTION PRN
Status: DISCONTINUED | OUTPATIENT
Start: 2021-07-28 | End: 2021-08-05 | Stop reason: HOSPADM

## 2021-07-28 RX ORDER — CLOPIDOGREL BISULFATE 75 MG/1
75 TABLET ORAL DAILY
Status: DISCONTINUED | OUTPATIENT
Start: 2021-07-28 | End: 2021-08-05 | Stop reason: HOSPADM

## 2021-07-28 RX ORDER — BALSALAZIDE DISODIUM 750 MG/1
2250 CAPSULE ORAL 3 TIMES DAILY
Status: DISCONTINUED | OUTPATIENT
Start: 2021-07-28 | End: 2021-08-05 | Stop reason: HOSPADM

## 2021-07-28 RX ORDER — SODIUM CHLORIDE 0.9 % (FLUSH) 0.9 %
5-40 SYRINGE (ML) INJECTION EVERY 12 HOURS SCHEDULED
Status: DISCONTINUED | OUTPATIENT
Start: 2021-07-28 | End: 2021-08-05 | Stop reason: HOSPADM

## 2021-07-28 RX ADMIN — IOPAMIDOL 75 ML: 755 INJECTION, SOLUTION INTRAVENOUS at 11:47

## 2021-07-28 RX ADMIN — POTASSIUM CHLORIDE 10 MEQ: 7.46 INJECTION, SOLUTION INTRAVENOUS at 21:32

## 2021-07-28 RX ADMIN — POTASSIUM CHLORIDE 10 MEQ: 7.46 INJECTION, SOLUTION INTRAVENOUS at 23:31

## 2021-07-28 RX ADMIN — ATORVASTATIN CALCIUM 40 MG: 40 TABLET, FILM COATED ORAL at 16:56

## 2021-07-28 RX ADMIN — CEFTRIAXONE 1000 MG: 1 INJECTION, POWDER, FOR SOLUTION INTRAMUSCULAR; INTRAVENOUS at 17:00

## 2021-07-28 RX ADMIN — LEVETIRACETAM 250 MG: 500 TABLET ORAL at 21:26

## 2021-07-28 RX ADMIN — SODIUM CHLORIDE: 9 INJECTION, SOLUTION INTRAVENOUS at 16:54

## 2021-07-28 RX ADMIN — CLOPIDOGREL BISULFATE 75 MG: 75 TABLET ORAL at 16:56

## 2021-07-28 RX ADMIN — PREDNISONE 20 MG: 20 TABLET ORAL at 16:55

## 2021-07-28 RX ADMIN — POTASSIUM CHLORIDE 10 MEQ: 7.46 INJECTION, SOLUTION INTRAVENOUS at 17:12

## 2021-07-28 RX ADMIN — DONEPEZIL HYDROCHLORIDE 5 MG: 5 TABLET, FILM COATED ORAL at 21:26

## 2021-07-28 RX ADMIN — POTASSIUM CHLORIDE 10 MEQ: 7.46 INJECTION, SOLUTION INTRAVENOUS at 12:20

## 2021-07-28 RX ADMIN — POTASSIUM CHLORIDE 10 MEQ: 7.46 INJECTION, SOLUTION INTRAVENOUS at 19:27

## 2021-07-28 RX ADMIN — MAGNESIUM SULFATE HEPTAHYDRATE 2000 MG: 40 INJECTION, SOLUTION INTRAVENOUS at 18:20

## 2021-07-28 RX ADMIN — PANTOPRAZOLE SODIUM 40 MG: 40 TABLET, DELAYED RELEASE ORAL at 16:56

## 2021-07-28 RX ADMIN — POTASSIUM CHLORIDE 10 MEQ: 7.46 INJECTION, SOLUTION INTRAVENOUS at 18:14

## 2021-07-28 RX ADMIN — SODIUM CHLORIDE, PRESERVATIVE FREE 10 ML: 5 INJECTION INTRAVENOUS at 16:56

## 2021-07-28 RX ADMIN — LISINOPRIL 20 MG: 20 TABLET ORAL at 16:56

## 2021-07-28 RX ADMIN — BALSALAZIDE DISODIUM 2250 MG: 750 CAPSULE ORAL at 23:25

## 2021-07-28 RX ADMIN — METOPROLOL SUCCINATE 25 MG: 25 TABLET, FILM COATED, EXTENDED RELEASE ORAL at 16:55

## 2021-07-28 ASSESSMENT — PAIN SCALES - PAIN ASSESSMENT IN ADVANCED DEMENTIA (PAINAD)
CONSOLABILITY: 0
BREATHING: 1
FACIALEXPRESSION: 0
BODYLANGUAGE: 0
NEGVOCALIZATION: 0
TOTALSCORE: 1

## 2021-07-28 ASSESSMENT — PAIN DESCRIPTION - LOCATION: LOCATION: ABDOMEN

## 2021-07-28 ASSESSMENT — PAIN DESCRIPTION - FREQUENCY: FREQUENCY: CONTINUOUS

## 2021-07-28 ASSESSMENT — PAIN DESCRIPTION - DESCRIPTORS: DESCRIPTORS: CONSTANT

## 2021-07-28 ASSESSMENT — PAIN SCALES - GENERAL
PAINLEVEL_OUTOF10: 0
PAINLEVEL_OUTOF10: 5

## 2021-07-28 ASSESSMENT — ENCOUNTER SYMPTOMS
VOMITING: 0
ABDOMINAL PAIN: 1
DIARRHEA: 1
NAUSEA: 0
SHORTNESS OF BREATH: 0

## 2021-07-28 ASSESSMENT — PAIN DESCRIPTION - PAIN TYPE: TYPE: ACUTE PAIN

## 2021-07-28 NOTE — PROGRESS NOTES
Patient to room 3110 from ED. Patient is alert and oriented X3 not to time. Vitals: she is hypertensive other vitals are stable. She had a incontinent BM on her way to the floor, she has MAD to her buttocks and radha olvin. Her skin is dry and flaky. Pulses: Review of floor, room, call light, phone and hospital policies complete. She is hard of hearing. Patient verbalized understanding. All questions have been answered.  Will monitor

## 2021-07-28 NOTE — ED NOTES
Report called to  Oxana Morris RN   To Room  3110  Cardiac monitor on during transfer  Pt's pain level   Denies   VSS, Afebrile   IV site is clean, dry and intact, Normal saline locked   Family updated on transfer            Renita Ervin RN  07/28/21 1911

## 2021-07-28 NOTE — CARE COORDINATION
INITIAL CASE MANAGEMENT ASSESSMENT    Reviewed chart, met with patient-patient has dementia, she is unable to complete assessment with SW. to assess possible discharge needs. Explained Case Management role/services. Living Situation: Lives with Nicolas Thomas [phone]- SW called son- no answer left message requesting a call back. SW called patient's daughter Author Chon [phone], left message requesting call back. PCP:Robert Sorensen       HD/PD: N/A    PLAN/COMMENTS: ED RN expressed concern over condition of patient - when she arrived in ED, Patient was covered in feces. Patient unable to discuss her home situation- SW has placed calls to pt's son and daughter - requesting call back. No response yet.

## 2021-07-28 NOTE — PROGRESS NOTES
4 Eyes Admission Assessment     I agree as the admission nurse that 2 RN's have performed a thorough Head to Toe Skin Assessment on the patient. ALL assessment sites listed below have been assessed on admission. Areas assessed by both nurses: mara silver/ german   [x]   Head, Face, and Ears   [x]   Shoulders, Back, and Chest  [x]   Arms, Elbows, and Hands   [x]   Coccyx, Sacrum, and Ischum  [x]   Legs, Feet, and Heels        Does the Patient have Skin Breakdown?  Albert Prevention initiated:  No  Wound Care Orders initiated:  No      [facility] nurse consulted for Pressure Injury (Stage 3,4, Unstageable, DTI, NWPT, and Complex wounds):  N/A     Nurse 1 eSignature: Electronically signed by Nilton Day RN on 7/28/2021 at 4:37 PM      **SHARE this note so that the co-signing nurse is able to place an eSignature**    Nurse 2 eSignature: Electronically signed by Lewis Ellis RN on 7/28/21 at 4:38 PM EDT

## 2021-07-28 NOTE — PLAN OF CARE
Problem: Falls - Risk of:  Goal: Will remain free from falls  Description: Will remain free from falls  Outcome: Met This Shift  Note: No falls noted this shift. Patient is up with a stedy and one staff assist. Bed kept in low position. Safe environment maintained. Bedside table & call light in reach. Uses call light appropriately when needing assistance. Goal: Absence of physical injury  Description: Absence of physical injury  Outcome: Met This Shift  Note: Patient has been assessed for fall risk, fall precautions are in place, environment has been cleared of obstacles and reassessed during rounding, bed is in lowest position with the wheels locked, call light is within reach.  ID band has been verified, appropriate transfer methods have been utilized and patient has been educated on safety      Problem: Skin Integrity:  Goal: Will show no infection signs and symptoms  Description: Will show no infection signs and symptoms  Outcome: Ongoing  Note: Patient has colitis and possible UTI she is getting antibiotics  Goal: Absence of new skin breakdown  Description: Absence of new skin breakdown  Outcome: Met This Shift  Note: Patient has been assessed for skin breakdown using the Albert scale, no new skin break down noted, patient is able to independently move all extremities, zinc paste to MAD, and intra dry to abdominal pannus

## 2021-07-28 NOTE — PROGRESS NOTES
Medication Reconciliation    List of medications for Ezekiel Sacks is currently taking is complete.      Source of information:   Epic records  Conversation with patient at bedside     Allergies  Cortisone and Percocet [oxycodone-acetaminophen]     Notes regarding home medications:   Patient did not receive any of their home medications prior to arrival to the emergency department  Patient stated it has been a few days since she has taken her medications      Vonnie Lambert, Pharmacy Student  7/28/2021 12:51 PM

## 2021-07-28 NOTE — PROGRESS NOTES
Patient in rule out C-diff precautions. She did take all of her medications without difficulty. She is eating dinner. She used her call light when she needed to go to the bathroom. Call light and personal items in reach, bed alarm set, will monitor.

## 2021-07-28 NOTE — ED NOTES
Bed: B-08  Expected date:   Expected time:   Means of arrival: [facility]  Comments:  Rose Overton RN  07/28/21 3649

## 2021-07-28 NOTE — H&P
Hospital Medicine History & Physical      PCP: Baldemar Garcia    Date of Admission: 7/28/2021    Date of Service: Pt seen/examined on 7/28/21 and Admitted to Inpatient     Chief Complaint: Altered mental status    History Of Present Illness: The patient is a 77 y.o. female who presents to [facility] with altered mental status. Patient has a history of dementia, ulcerative colitis, hypertension, hyperlipidemia who presents to the emergency department with altered mental status for a few days. Patient lives with her son after recent admission to the hospital followed by a short stay at a SNF. Patient at that point had diarrhea, JUNI, urinary tract infection along with acute metabolic encephalopathy. Patient arrived to the emergency department covered in feces along with her son. Patient's labs on admission showed a potassium of 2.5, magnesium of 1.5 and a white count of 13. Urinalysis was concerning for urinary tract infection and a CT of her abdomen pelvis showed pancolitis. C. difficile along with GI pathogens ordered. Patient started on IV fluids along with broad-spectrum IV antibiotics. Patient also started on prednisone low-dose for possible ulcerative colitis flareup.   GI consulted and she will be admitted to the hospital for further care and intervention    Past Medical History:        Diagnosis Date    Acute MI [facility]) 2003    Eczema     [place] (hard of hearing)     Hypercholesteremia     Hypertension     Pacemaker     Ulcerative colitis 2003    \"resolved\" per pt    Wears hearing aid        Past Surgical History:        Procedure Laterality Date    ANKLE FRACTURE SURGERY  7/26/11    ORIF left ankle fracture    COLONOSCOPY N/A 1/20/2020    COLONOSCOPY WITH BIOPSY performed by Hardeep Grubbs MD at [facility]      x5    HYSTERECTOMY  1996    dub    PACEMAKER INSERTION      RHINOPLASTY      UPPER GASTROINTESTINAL ENDOSCOPY N/A 1/20/2020    EGD BIOPSY performed by Brenda Valle MD at [address]       Medications Prior to Admission:    Prior to Admission medications    Medication Sig Start Date End Date Taking? Authorizing Provider   balsalazide (COLAZAL) 750 MG capsule Take 2,250 mg by mouth 3 times daily   Yes Historical Provider, MD   donepezil (ARICEPT) 5 MG tablet Take 1 tablet by mouth nightly 4/20/21  Yes Chari Smith DO   vitamin B-12 1000 MCG tablet Take 1 tablet by mouth daily 11/19/20  Yes Aleksandar Barnes MD   levETIRAcetam (KEPPRA) 250 MG tablet Take 1 tablet by mouth 2 times daily 1/25/20  Yes ROBERT Page CNP   pantoprazole (PROTONIX) 40 MG tablet Take 1 tablet by mouth 2 times daily (before meals) 1/25/20  Yes ROBERT Page CNP   lisinopril (PRINIVIL;ZESTRIL) 20 MG tablet Take 20 mg by mouth daily. Yes Historical Provider, MD   clopidogrel (PLAVIX) 75 MG tablet Take 75 mg by mouth daily. Yes Historical Provider, MD   metoprolol (TOPROL-XL) 25 MG XL tablet Take 25 mg by mouth daily. Yes Historical Provider, MD   atorvastatin (LIPITOR) 40 MG tablet Take 40 mg by mouth daily. Yes Historical Provider, MD       Allergies:  Cortisone and Percocet [oxycodone-acetaminophen]    Social History:  The patient currently lives at home with her son    TOBACCO:   reports that she has never smoked. She has never used smokeless tobacco.  ETOH:   reports no history of alcohol use. Family History:  Reviewed in detail and negative for DM, Early CAD, Cancer, CVA. Positive as follows:        Problem Relation Age of Onset    Heart Disease Mother     Ulcerative Colitis Father     Ulcerative Colitis Son        REVIEW OF SYSTEMS:   Positive for as noted in the HPI. All other systems reviewed and negative.     PHYSICAL EXAM:    BP (!) 179/101   Pulse 72   Temp 97.7 °F (36.5 °C) (Oral)   Resp 18   Ht 4' 10\" (1.473 m)   Wt 165 lb 5.5 oz (75 kg)   SpO2 96%   BMI 34.56 kg/m²     General appearance: No apparent distress, alert but confused  HEENT Normal cephalic, atraumatic without obvious deformity. Pupils equal, round, and reactive to light. Extra ocular muscles intact. Conjunctivae/corneas clear. Neck: Supple, No jugular venous distention/bruits. Trachea midline without thyromegaly or adenopathy with full range of motion. Lungs: Clear to auscultation, bilaterally without Rales/Wheezes/Rhonchi with good respiratory effort. Heart: Regular rate and rhythm with Normal S1/S2   Abdomen: Soft, non-tender or non-distended without rigidity or guarding and positive bowel sounds all four quadrants. Extremities: No clubbing, cyanosis, or edema bilaterally. Skin: Skin color, texture, turgor normal.  No rashes or lesions. Neurologic: Alert and oriented X 1, grossly non-focal.  Mental status: Alert  Capillary Refill: Acceptable  < 3 seconds  Peripheral Pulses: +3 Easily felt, not easily obliterated with pressure      CT:  I have reviewed the CT with the following interpretation: Diffuse colitis  EKG:  I have reviewed the EKG with the following interpretation: Normal sinus rhythm          CBC   Recent Labs     07/28/21  1024   WBC 13.3*   HGB 10.9*   HCT 34.0*         RENAL  Recent Labs     07/28/21  1024      K 2.5*      CO2 23   BUN 12   CREATININE 1.0     LFT'S  Recent Labs     07/28/21  1024   AST 15   ALT <5*   BILITOT 0.4   ALKPHOS 102     COAG  No results for input(s): INR in the last 72 hours. CARDIAC ENZYMES  No results for input(s): CKTOTAL, CKMB, CKMBINDEX, TROPONINI in the last 72 hours.     U/A:    Lab Results   Component Value Date    COLORU YELLOW 07/28/2021    WBCUA 11 07/28/2021    RBCUA 2 07/28/2021    MUCUS 1+ 01/16/2020    BACTERIA 4+ 07/28/2021    CLARITYU CLOUDY 07/28/2021    SPECGRAV 1.018 07/28/2021    LEUKOCYTESUR Negative 07/28/2021    BLOODU Negative 07/28/2021    GLUCOSEU Negative 07/28/2021    GLUCOSEU NEGATIVE 11/02/2010       ABG  No results found for: YFN1ZFJ, BEART, Q7IQAFGH, PHART, THGBART, IFK9YPX, PO2ART, TVW4EEP      PHYSICIANS CERTIFICATION:    I certify that Michelle Guzman is expected to be hospitalized for more than 2 midnights based on the following assessment and plan:      ASSESSMENT/PLAN:    Diarrhea  With recent hospitalizations need to rule out infectious diarrhea  C. difficile and GI pathogens ordered  GI consulted  CT with diffuse colitis  Possible ulcerative colitis flare  Start low-dose steroid    Urinary tract infection  Await culture and sensitivity  Continue Rocephin    Acute metabolic encephalopathy  Likely secondary to above  Treat underlying conditions    Hypomagnesemia  Replete and recheck    Hypokalemia  Replete and recheck    DVT Prophylaxis: lovenox  Diet: ADULT DIET; Regular  Code Status: Full Code  PT/OT Eval Status: Will need    Brando Cohen MD    Thank you Neelima Juarez for the opportunity to be involved in this patient's care. If you have any questions or concerns please feel free to contact me at [phone].
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
## 2021-07-29 LAB
ANION GAP SERPL CALCULATED.3IONS-SCNC: 12 MMOL/L (ref 3–16)
BASOPHILS ABSOLUTE: 0.1 K/UL (ref 0–0.2)
BASOPHILS RELATIVE PERCENT: 0.4 %
BUN BLDV-MCNC: 10 MG/DL (ref 7–20)
C DIFF TOXIN/ANTIGEN: NORMAL
CALCIUM SERPL-MCNC: 7.9 MG/DL (ref 8.3–10.6)
CHLORIDE BLD-SCNC: 107 MMOL/L (ref 99–110)
CO2: 21 MMOL/L (ref 21–32)
CREAT SERPL-MCNC: 0.5 MG/DL (ref 0.6–1.2)
EOSINOPHILS ABSOLUTE: 0.4 K/UL (ref 0–0.6)
EOSINOPHILS RELATIVE PERCENT: 3.2 %
GFR AFRICAN AMERICAN: >60
GFR NON-AFRICAN AMERICAN: >60
GLUCOSE BLD-MCNC: 130 MG/DL (ref 70–99)
HCT VFR BLD CALC: 32.7 % (ref 36–48)
HEMATOLOGY PATH CONSULT: NO
HEMOGLOBIN: 10.1 G/DL (ref 12–16)
LYMPHOCYTES ABSOLUTE: 2.7 K/UL (ref 1–5.1)
LYMPHOCYTES RELATIVE PERCENT: 20.6 %
MAGNESIUM: 2.3 MG/DL (ref 1.8–2.4)
MCH RBC QN AUTO: 19.9 PG (ref 26–34)
MCHC RBC AUTO-ENTMCNC: 30.8 G/DL (ref 31–36)
MCV RBC AUTO: 64.7 FL (ref 80–100)
MONOCYTES ABSOLUTE: 0.8 K/UL (ref 0–1.3)
MONOCYTES RELATIVE PERCENT: 5.7 %
NEUTROPHILS ABSOLUTE: 9.4 K/UL (ref 1.7–7.7)
NEUTROPHILS RELATIVE PERCENT: 70.1 %
PDW BLD-RTO: 19 % (ref 12.4–15.4)
PLATELET # BLD: 329 K/UL (ref 135–450)
PMV BLD AUTO: 7 FL (ref 5–10.5)
POTASSIUM REFLEX MAGNESIUM: 3.4 MMOL/L (ref 3.5–5.1)
RBC # BLD: 5.06 M/UL (ref 4–5.2)
REASON FOR REJECTION: NORMAL
REJECTED TEST: NORMAL
SODIUM BLD-SCNC: 140 MMOL/L (ref 136–145)
WBC # BLD: 13.4 K/UL (ref 4–11)

## 2021-07-29 PROCEDURE — 85025 COMPLETE CBC W/AUTO DIFF WBC: CPT

## 2021-07-29 PROCEDURE — 36415 COLL VENOUS BLD VENIPUNCTURE: CPT

## 2021-07-29 PROCEDURE — 6370000000 HC RX 637 (ALT 250 FOR IP): Performed by: INTERNAL MEDICINE

## 2021-07-29 PROCEDURE — 6360000002 HC RX W HCPCS: Performed by: INTERNAL MEDICINE

## 2021-07-29 PROCEDURE — 87324 CLOSTRIDIUM AG IA: CPT

## 2021-07-29 PROCEDURE — 1200000000 HC SEMI PRIVATE

## 2021-07-29 PROCEDURE — 2580000003 HC RX 258: Performed by: INTERNAL MEDICINE

## 2021-07-29 PROCEDURE — 80048 BASIC METABOLIC PNL TOTAL CA: CPT

## 2021-07-29 PROCEDURE — 94761 N-INVAS EAR/PLS OXIMETRY MLT: CPT

## 2021-07-29 PROCEDURE — 87449 NOS EACH ORGANISM AG IA: CPT

## 2021-07-29 PROCEDURE — 83735 ASSAY OF MAGNESIUM: CPT

## 2021-07-29 RX ORDER — METHYLPREDNISOLONE SODIUM SUCCINATE 40 MG/ML
20 INJECTION, POWDER, LYOPHILIZED, FOR SOLUTION INTRAMUSCULAR; INTRAVENOUS EVERY 8 HOURS
Status: DISCONTINUED | OUTPATIENT
Start: 2021-07-29 | End: 2021-07-30

## 2021-07-29 RX ADMIN — POTASSIUM CHLORIDE 40 MEQ: 20 TABLET, EXTENDED RELEASE ORAL at 09:25

## 2021-07-29 RX ADMIN — METHYLPREDNISOLONE SODIUM SUCCINATE 20 MG: 40 INJECTION, POWDER, FOR SOLUTION INTRAMUSCULAR; INTRAVENOUS at 09:24

## 2021-07-29 RX ADMIN — LISINOPRIL 20 MG: 20 TABLET ORAL at 09:24

## 2021-07-29 RX ADMIN — LEVETIRACETAM 250 MG: 500 TABLET ORAL at 09:24

## 2021-07-29 RX ADMIN — DONEPEZIL HYDROCHLORIDE 5 MG: 5 TABLET, FILM COATED ORAL at 20:30

## 2021-07-29 RX ADMIN — METHYLPREDNISOLONE SODIUM SUCCINATE 20 MG: 40 INJECTION, POWDER, FOR SOLUTION INTRAMUSCULAR; INTRAVENOUS at 16:05

## 2021-07-29 RX ADMIN — SODIUM CHLORIDE: 9 INJECTION, SOLUTION INTRAVENOUS at 07:03

## 2021-07-29 RX ADMIN — METOPROLOL SUCCINATE 25 MG: 25 TABLET, FILM COATED, EXTENDED RELEASE ORAL at 09:23

## 2021-07-29 RX ADMIN — BALSALAZIDE DISODIUM 2250 MG: 750 CAPSULE ORAL at 14:19

## 2021-07-29 RX ADMIN — ENOXAPARIN SODIUM 40 MG: 40 INJECTION SUBCUTANEOUS at 09:23

## 2021-07-29 RX ADMIN — ATORVASTATIN CALCIUM 40 MG: 40 TABLET, FILM COATED ORAL at 09:24

## 2021-07-29 RX ADMIN — VANCOMYCIN HYDROCHLORIDE 1250 MG: 10 INJECTION, POWDER, LYOPHILIZED, FOR SOLUTION INTRAVENOUS at 14:20

## 2021-07-29 RX ADMIN — PANTOPRAZOLE SODIUM 40 MG: 40 TABLET, DELAYED RELEASE ORAL at 16:05

## 2021-07-29 RX ADMIN — CLOPIDOGREL BISULFATE 75 MG: 75 TABLET ORAL at 09:24

## 2021-07-29 RX ADMIN — LEVETIRACETAM 250 MG: 500 TABLET ORAL at 20:30

## 2021-07-29 RX ADMIN — PANTOPRAZOLE SODIUM 40 MG: 40 TABLET, DELAYED RELEASE ORAL at 07:03

## 2021-07-29 RX ADMIN — BALSALAZIDE DISODIUM 2250 MG: 750 CAPSULE ORAL at 20:30

## 2021-07-29 RX ADMIN — METHYLPREDNISOLONE SODIUM SUCCINATE 20 MG: 40 INJECTION, POWDER, FOR SOLUTION INTRAMUSCULAR; INTRAVENOUS at 23:08

## 2021-07-29 RX ADMIN — BALSALAZIDE DISODIUM 2250 MG: 750 CAPSULE ORAL at 09:24

## 2021-07-29 ASSESSMENT — PAIN SCALES - PAIN ASSESSMENT IN ADVANCED DEMENTIA (PAINAD)
BODYLANGUAGE: 0
BREATHING: 0
FACIALEXPRESSION: 0
CONSOLABILITY: 0
NEGVOCALIZATION: 0
TOTALSCORE: 0

## 2021-07-29 ASSESSMENT — PAIN SCALES - GENERAL: PAINLEVEL_OUTOF10: 0

## 2021-07-29 NOTE — CONSULTS
Clinical Pharmacy Note  Vancomycin Consult    Humberto Nichols is a 77 y.o. female ordered Vancomycin for enterococcus UTI; consult received from Dr. Rey Gonzalez to manage therapy. Patient Active Problem List   Diagnosis    Ankle fracture    CAD (coronary artery disease)    Pacemaker    HTN (hypertension)    Hypercholesteremia    [address]ative [address]) [address] [address]    Intractable headache    Acute blood loss anemia    Atypical syncope    Acute encephalopathy    Acute metabolic encephalopathy    Suspected COVID-19 virus infection    Falls frequently       Allergies:  Cortisone and Percocet [oxycodone-acetaminophen]     Temp max:  Temp (24hrs), Av.1 °F (36.7 °C), Min:97.5 °F (36.4 °C), Max:98.7 °F (37.1 °C)      Recent Labs     21  1024 21  0917   WBC 13.3* 13.4*       Recent Labs     21  1024 21  0514   BUN 12 10   CREATININE 1.0 0.5*         Intake/Output Summary (Last 24 hours) at 2021 1417  Last data filed at 2021 1243  Gross per 24 hour   Intake 1289.62 ml   Output --   Net 1289.62 ml       Culture Results:  Urine:  Enterococcus faecium - awaiting sensitivities     Ht Readings from Last 1 Encounters:   21 4' 10\" (1.473 m)        Wt Readings from Last 1 Encounters:   21 164 lb 7.4 oz (74.6 kg)         CrCl cannot be calculated (Unknown ideal weight. ). Assessment/Plan:  Vancomycin 1,250 mg IV every 18 hours ordered. Regimen projects a trough level of 10-15 mg/L. Level ordered for 21 at 0200. Thank you for the consult.      Doris Jensen, [facility], PharmD, BCPS  2021 2:18 PM

## 2021-07-29 NOTE — PROGRESS NOTES
Pt alert and oriented x4 with episodes of forgetfulness. Pt aware of POC, pending stool collection at this time. No BMs at this time. Pt to BR with stedy x1, able to follow simple commands. Safety precaution in place.

## 2021-07-29 NOTE — CARE COORDINATION
Spoke with patients son. Reports patient lives with him. She is only alone when he runs errands etc.  States he leaves taking meds up to her so it is quite possible she is not taking them correctly. Reports need for long term care and states patient is aware and agreeable. Reports frequent falls and wandering at night. Agreed to leave list in room and marked facilities with secured units.  Son will review and get back with SW.   Electronically signed by Guillermina Gomez on 7/29/2021 at 5:44 PM

## 2021-07-29 NOTE — CONSULTS
GASTROENTEROLOGY INPATIENT CONSULTATION        IDENTIFYING DATA/REASON FOR CONSULTATION   PATIENT:  Lamar Hickman  MRN:  [id number]  ADMIT DATE: 7/28/2021  TIME OF EVALUATION: 7/29/2021 7:14 AM  HOSPITAL STAY:   LOS: 1 day     REASON FOR CONSULTATION:  Ulcerative colitis flare    HISTORY OF PRESENT ILLNESS   Lamar Hickman is a 77 y.o. female with a PMH of ulcerative colitis, dementia, CAD on plavix, HTN, HLD, pacemaker who presented on 7/28/2021 with altered mental status and diarrhea. CT showed diffuse colitis. UA was concerning for UTI. Blood work noted hypokalemia, mild leukocytosis. She was admitted and we have been consulted regarding UC flare. She follows with Dr. Pepe Carson for her history of UC. She is managed on Balsalazide however has a history of not taking her medications. Patient is a poor historian. She complains of having diarrhea throughout the night. She currently denies abdominal pain. Prior Endoscopic Evaluations:  EGD/colonoscopy 1/20/2020 with Dr. Huma Saenz  1) 5cm hiatal hernia. 2) Erosive gastritis of the antrum. Biopsies were obtained from the antrum and body of the stomach to rule out active gastritis and H.pylori gastritis. 3) Normal duodenum. Cold forceps biopsies were performed to rule out celiac disease. 4) Moderate colitis with erythema, edema, and ulcerations from the rectum to the hepatic flexure. Cold forceps biopsies were obtained and stool studies collected. 5) Normal colonic mucosa in the right colon and cecum. Cold forceps biopsies were performed. performed.      A. Small bowel, biopsy:       - Small bowel mucosa with no pathologic change       - No evidence of villous blunting or significantly increased         intraepithelial lymphocytes       - No evidence of dysplasia or malignancy     B.  Stomach, antrum, biopsy:       - Mild chronic inactive gastritis       - No evidence of intestinal metaplasia, dysplasia, or malignancy       - No morphologic evidence of helicobacter pylori infection     C. Colon, right, biopsy:       - Colonic mucosa with no pathologic change       - No evidence of dysplasia or malignancy       - No evidence of active inflammation     D. Colon, left, biopsy:       - Severe chronic active colitis       - No evidence of granulomas, dysplasia or malignancy       - CMV immunohistochemical staining with appropriate controls is         negative      Colonoscopy 12/11/2019 with Dr. Spicer  1. Ulcerative pan-colitis, mild in right colon and moderate/severe in the left colon  2. Random biopsies obtained  3. Normal terminal ileum  4. Mild sigmoid colon diverticulosis  5. Small internal hemorrhoids  A.  Right colon, biopsy:  - One colonic fragment with mild chronic active colitis. - Remaining colonic fragments with no significant abnormality.  - No dysplasia identified. B.  Left colon, biopsy:  - Moderate to severe chronic active colitis. - No dysplasia identified.       PAST MEDICAL, SURGICAL, FAMILY, and SOCIAL HISTORY     Past Medical History:   Diagnosis Date    Acute MI ([address] 2003    Eczema     [place] (hard of hearing)     Hypercholesteremia     Hypertension     Pacemaker     Ulcerative colitis 2003    \"resolved\" per pt    Wears hearing aid      Past Surgical History:   Procedure Laterality Date    ANKLE FRACTURE SURGERY  7/26/11    ORIF left ankle fracture    COLONOSCOPY N/A 1/20/2020    COLONOSCOPY WITH BIOPSY performed by Mykel Rivers MD at [facility]      x5   [facility]    PACEMAKER INSERTION      RHINOPLASTY      UPPER GASTROINTESTINAL ENDOSCOPY N/A 1/20/2020    EGD BIOPSY performed by Mykel Rivers MD at [facility] History   Problem Relation Age of Onset    Heart Disease Mother     Ulcerative Colitis Father     Ulcerative Colitis Son      Social History     Socioeconomic History    Marital status: Single     Spouse name: Not on file    Number of children: Not on file    Years of education: Not on file    Highest education level: Not on file   Occupational History    Occupation: retired       Comment: [place]   Tobacco Use    Smoking status: Never Smoker    Smokeless tobacco: Never Used   Substance and Sexual Activity    Alcohol use: No     Alcohol/week: 0.0 standard drinks    Drug use: No    Sexual activity: Not Currently   Other Topics Concern    Not on file   Social History Narrative    Not on file     Social Determinants of Health     Financial Resource Strain:     Difficulty of Paying Living Expenses:    Food Insecurity:     Worried About [address] [employer] in the Last Year:     [address] [employer] in the Last Year:    Transportation Needs:     Lack of Transportation (Medical):      Lack of Transportation (Non-Medical):    Physical Activity:     Days of Exercise per Week:     Minutes of Exercise per Session:    Stress:     Feeling of Stress :    Social Connections:     Frequency of Communication with Friends and Family:     Frequency of Social Gatherings with Friends and Family:     Attends Scientology Services:     Active Member of Clubs or Organizations:     Attends Club or Organization Meetings:     Marital Status:    Intimate Partner Violence:     Fear of Current or Ex-Partner:     Emotionally Abused:     Physically Abused:     Sexually Abused:        MEDICATIONS   SCHEDULED:  methylPREDNISolone, 20 mg, Q8H  atorvastatin, 40 mg, Daily  clopidogrel, 75 mg, Daily  donepezil, 5 mg, Nightly  levETIRAcetam, 250 mg, BID  metoprolol succinate, 25 mg, Daily  lisinopril, 20 mg, Daily  pantoprazole, 40 mg, BID AC  sodium chloride flush, 5-40 mL, 2 times per day  enoxaparin, 40 mg, Daily  cefTRIAXone (ROCEPHIN) IV, 1,000 mg, Q24H  balsalazide, 2,250 mg, TID      FLUIDS/DRIPS:     sodium chloride      sodium chloride 75 mL/hr at 07/29/21 0703     PRNs: sodium chloride flush, 5-40 mL, PRN  sodium chloride, 25 mL, PRN  ondansetron, 4 mg, Q8H PRN   Or  ondansetron, 4 mg, Q6H PRN  polyethylene glycol, 17 g, Daily PRN  acetaminophen, 650 mg, Q6H PRN   Or  acetaminophen, 650 mg, Q6H PRN  potassium chloride, 40 mEq, PRN   Or  potassium alternative oral replacement, 40 mEq, PRN   Or  potassium chloride, 10 mEq, PRN  magnesium sulfate, 2,000 mg, PRN      ALLERGIES:  She   Allergies   Allergen Reactions    Cortisone Other (See Comments)     Injection site site sunk in    Percocet [Oxycodone-Acetaminophen] Nausea And Vomiting       REVIEW OF SYSTEMS   Pertinent ROS noted in HPI    PHYSICAL EXAM     Vitals:    07/28/21 1605 07/28/21 1620 07/28/21 1931 07/29/21 0628   BP: (!) 179/101  113/64    Pulse: 72  67    Resp: 18  16    Temp: 97.7 °F (36.5 °C)  98.5 °F (36.9 °C)    TempSrc: Oral  Oral    SpO2: 96%  94%    Weight:    164 lb 7.4 oz (74.6 kg)   Height:  4' 10\" (1.473 m)         I/O last 3 completed shifts: In: 809.6 [P.O.:360; I.V.:84; IV Piggyback:365.6]  Out: -       Physical Exam:  General appearance: alert, no distress, very [place]  Eyes: Anicteric  Head: Normocephalic, without obvious abnormality  Lungs: clear to auscultation bilaterally, Normal Effort  Heart: regular rate and rhythm, normal S1 and S2  Abdomen: soft, non-distended, non-tender. Bowel sounds normal  Extremities: atraumatic, no cyanosis or edema  Skin: warm and dry, no jaundice  Neuro: Grossly intact, Alert, confused      LABS AND IMAGING   Laboratory   Recent Labs     07/28/21  1024   WBC 13.3*   HGB 10.9*   HCT 34.0*   MCV 63.8*        Recent Labs     07/28/21  1024 07/29/21  0514    140   K 2.5* 3.4*    107   CO2 23 21   BUN 12 10   CREATININE 1.0 0.5*     Recent Labs     07/28/21  1024   AST 15   ALT <5*   BILITOT 0.4   ALKPHOS 102     Recent Labs     07/28/21  1024   LIPASE 17.0     No results for input(s): PROTIME, INR in the last 72 hours.     Imaging  CT ABDOMEN PELVIS W IV CONTRAST Additional Contrast? None

## 2021-07-29 NOTE — PLAN OF CARE
Problem: Nutrition  Goal: Optimal nutrition therapy  Outcome: Ongoing     Nutrition Problem #1: Inadequate oral intake  Intervention: Food and/or Nutrient Delivery: Continue Current Diet, Start Oral Nutrition Supplement  Nutritional Goals:  Tolerate diet and consume greater than 50% of meals and supplements

## 2021-07-29 NOTE — PROGRESS NOTES
mg Intravenous Q24H    balsalazide  2,250 mg Oral TID     PRN Meds: sodium chloride flush, sodium chloride, ondansetron **OR** ondansetron, polyethylene glycol, acetaminophen **OR** acetaminophen, potassium chloride **OR** potassium alternative oral replacement **OR** potassium chloride, magnesium sulfate      Intake/Output Summary (Last 24 hours) at 7/29/2021 0934  Last data filed at 7/29/2021 0826  Gross per 24 hour   Intake 929.62 ml   Output --   Net 929.62 ml       Physical Exam Performed:    /78   Pulse 64   Temp 97.5 °F (36.4 °C) (Oral)   Resp 13   Ht 4' 10\" (1.473 m)   Wt 164 lb 7.4 oz (74.6 kg)   SpO2 96%   BMI 34.37 kg/m²     General appearance: No apparent distress, appears stated age and cooperative. HEENT: Pupils equal, round, and reactive to light. Conjunctivae/corneas clear. Neck: Supple, with full range of motion. No jugular venous distention. Trachea midline. Respiratory:  Normal respiratory effort. Clear to auscultation  Cardiovascular: Regular rate and rhythm with normal S1/S2   Abdomen: Soft, non-tender, non-distended with normal bowel sounds. Musculoskeletal: No clubbing, cyanosis or edema bilaterally. Skin: Skin color, texture, turgor normal.  No rashes or lesions. Neurologic:  Neurovascularly intact without any focal sensory/motor deficits. Cranial nerves: II-XII intact, grossly non-focal.  Psychiatric: Alert and confused  Capillary Refill: Brisk,< 3 seconds   Peripheral Pulses: +2 palpable, equal bilaterally       Labs:   Recent Labs     07/28/21  1024   WBC 13.3*   HGB 10.9*   HCT 34.0*        Recent Labs     07/28/21  1024 07/29/21  0514    140   K 2.5* 3.4*    107   CO2 23 21   BUN 12 10   CREATININE 1.0 0.5*   CALCIUM 8.1* 7.9*     Recent Labs     07/28/21  1024   AST 15   ALT <5*   BILITOT 0.4   ALKPHOS 102     No results for input(s): INR in the last 72 hours. No results for input(s): Emily Dec in the last 72 hours.     Urinalysis: Lab Results   Component Value Date    NITRU Negative 07/28/2021    WBCUA 11 07/28/2021    BACTERIA 4+ 07/28/2021    RBCUA 2 07/28/2021    BLOODU Negative 07/28/2021    SPECGRAV 1.018 07/28/2021    GLUCOSEU Negative 07/28/2021    GLUCOSEU NEGATIVE 11/02/2010       Radiology:  CT ABDOMEN PELVIS W IV CONTRAST Additional Contrast? None   Final Result   Diffuse colitis. No evidence of obstruction or perforation. Large hiatal hernia. Assessment/Plan:    Diarrhea  C. difficile neg, GI pathogens pending  GI consulted  CT with diffuse colitis  Possible ulcerative colitis flare  Solumedrol 20mg q8     Urinary tract infection  Await culture and sensitivity: enteroccus faecium  Continue Rocephin     Acute metabolic encephalopathy  Likely secondary to above  Treat underlying conditions  resolving     Hypomagnesemia  Replete and recheck  resolved     Hypokalemia  Replete and recheck    DVT Prophylaxis: lovenox  Diet: ADULT DIET;  Regular  Code Status: Full Code    PT/OT Eval Status: Ordered    Dispo - Norma Teixeira MD

## 2021-07-29 NOTE — CONSULTS
Comprehensive Nutrition Assessment    Type and Reason for Visit:  Consult    Nutrition Recommendations/Plan:   Regular diet   Trial Ensure Clear BID  Will monitor nutritional adequacy, nutrition-related labs, weights, BMs, and clinical progress     Nutrition Assessment:  Cosult for poor intake. Pt with hx of ulcerative colitis, dementia, CAD, HTN, HLD. Pt presented with diarrhea, confusion. Being treated for possible UC flare. In [facility] precautions, did not see per dept policy. Per EMR, wt has been stable. Currently on Regular diet, intakes varied. Will trial ONS. Malnutrition Assessment:  Malnutrition Status:  Insufficient data      Estimated Daily Nutrient Needs:  Energy (kcal):  4490-9004 kcal (15-20 kcal/kg ABW); Weight Used for Energy Requirements:  Current     Protein (g):  49-82 gm (1.2-2 gm/kg ABW); Weight Used for Protein Requirements:  Ideal        Fluid (ml/day):   ; Method Used for Fluid Requirements:  1 ml/kcal      Nutrition Related Findings:  BM x 2 x 24 hr      Wounds:  None       Current Nutrition Therapies:    ADULT DIET; Regular    Anthropometric Measures:  · Height: 4' 10\" (147.3 cm)  · Current Body Weight: 164 lb (74.4 kg)   · Admission Body Weight: 165 lb (74.8 kg)    · Usual Body Weight: 164 lb (74.4 kg)     · Ideal Body Weight: 90 lbs; % Ideal Body Weight 182.2 %   · BMI: 34.3  · Adjusted Body Weight:  ; No Adjustment   · BMI Categories: Obese Class 1 (BMI 30.0-34. 9)       Nutrition Diagnosis:   · Inadequate oral intake related to altered GI function as evidenced by intake 26-50%      Nutrition Interventions:   Food and/or Nutrient Delivery:  Continue Current Diet, Start Oral Nutrition Supplement  Nutrition Education/Counseling:  No recommendation at this time   Coordination of Nutrition Care:  Continue to monitor while inpatient    Goals:   Tolerate diet and consume greater than 50% of meals and supplements       Nutrition Monitoring and Evaluation:   Behavioral-Environmental Outcomes:  None Identified   Food/Nutrient Intake Outcomes:  Food and Nutrient Intake, Supplement Intake  Physical Signs/Symptoms Outcomes:  Biochemical Data, Diarrhea, Nutrition Focused Physical Findings, Skin, Weight     Discharge Planning:     Too soon to determine     Electronically signed by Bhavya Early RD, LD on 7/29/21 at 12:11 PM EDT    Contact: [phone]

## 2021-07-29 NOTE — PLAN OF CARE
Problem: Falls - Risk of:  Goal: Will remain free from falls  Description: Will remain free from falls  Outcome: Ongoing  Note: Fall risk assessment completed. Fall precautions in place. Call light within reach. Pt educated on calling for assistance before getting up. Walkway free of clutter. Will continue to monitor. Problem: Falls - Risk of:  Goal: Absence of physical injury  Description: Absence of physical injury  Outcome: Ongoing  Note: Pt is free of injury. No injury noted. Fall precautions in place. Call light within reach. Will monitor. Problem: Skin Integrity:  Goal: Will show no infection signs and symptoms  Description: Will show no infection signs and symptoms  Outcome: Ongoing     Problem: Skin Integrity:  Goal: Absence of new skin breakdown  Description: Absence of new skin breakdown  Outcome: Ongoing  Note: No new skin breakdown noted     Problem: Nutrition  Goal: Optimal nutrition therapy  7/29/2021 1846 by Nellie Fuentes, RN  Outcome: Ongoing  Note: Patient tolerating PO intake well.

## 2021-07-29 NOTE — PROGRESS NOTES
Pt A&Ox4. Morning meds given. Assessment complete. Denies pain, denies nausea. Potassium this AM 3.4, replaced with oral K+ per protocol. No needs voiced. Fall precautions in place. Call light within reach. Will continue to monitor.     Electronically signed by Etienne Grimes RN on 7/29/2021 at 9:53 AM

## 2021-07-29 NOTE — PLAN OF CARE
Problem: Falls - Risk of:  Goal: Will remain free from falls  Description: Will remain free from falls  7/28/2021 2235 by Rosy Chin RN  Outcome: Ongoing     Problem: Falls - Risk of:  Goal: Absence of physical injury  Description: Absence of physical injury  7/28/2021 2235 by Rosy Chin RN  Outcome: Ongoing     Problem: Skin Integrity:  Goal: Will show no infection signs and symptoms  Description: Will show no infection signs and symptoms  7/28/2021 2235 by Rosy Chin RN  Outcome: Ongoing     Problem: Skin Integrity:  Goal: Absence of new skin breakdown  Description: Absence of new skin breakdown  7/28/2021 2235 by Rosy Chin RN  Outcome: Ongoing

## 2021-07-30 LAB
ORGANISM: ABNORMAL
URINE CULTURE, ROUTINE: ABNORMAL

## 2021-07-30 PROCEDURE — 87505 NFCT AGENT DETECTION GI: CPT

## 2021-07-30 PROCEDURE — 94761 N-INVAS EAR/PLS OXIMETRY MLT: CPT

## 2021-07-30 PROCEDURE — 6370000000 HC RX 637 (ALT 250 FOR IP): Performed by: INTERNAL MEDICINE

## 2021-07-30 PROCEDURE — 1200000000 HC SEMI PRIVATE

## 2021-07-30 PROCEDURE — 97530 THERAPEUTIC ACTIVITIES: CPT

## 2021-07-30 PROCEDURE — 97162 PT EVAL MOD COMPLEX 30 MIN: CPT

## 2021-07-30 PROCEDURE — 2580000003 HC RX 258: Performed by: INTERNAL MEDICINE

## 2021-07-30 PROCEDURE — 6360000002 HC RX W HCPCS: Performed by: INTERNAL MEDICINE

## 2021-07-30 RX ORDER — PREDNISONE 20 MG/1
20 TABLET ORAL 2 TIMES DAILY
Status: DISCONTINUED | OUTPATIENT
Start: 2021-07-30 | End: 2021-08-03

## 2021-07-30 RX ORDER — AMOXICILLIN AND CLAVULANATE POTASSIUM 875; 125 MG/1; MG/1
1 TABLET, FILM COATED ORAL EVERY 12 HOURS SCHEDULED
Status: DISCONTINUED | OUTPATIENT
Start: 2021-07-30 | End: 2021-08-05 | Stop reason: HOSPADM

## 2021-07-30 RX ADMIN — ATORVASTATIN CALCIUM 40 MG: 40 TABLET, FILM COATED ORAL at 08:19

## 2021-07-30 RX ADMIN — CLOPIDOGREL BISULFATE 75 MG: 75 TABLET ORAL at 08:19

## 2021-07-30 RX ADMIN — BALSALAZIDE DISODIUM 2250 MG: 750 CAPSULE ORAL at 22:34

## 2021-07-30 RX ADMIN — AMOXICILLIN AND CLAVULANATE POTASSIUM 1 TABLET: 875; 125 TABLET, FILM COATED ORAL at 10:56

## 2021-07-30 RX ADMIN — BALSALAZIDE DISODIUM 2250 MG: 750 CAPSULE ORAL at 16:02

## 2021-07-30 RX ADMIN — LISINOPRIL 20 MG: 20 TABLET ORAL at 08:19

## 2021-07-30 RX ADMIN — LEVETIRACETAM 250 MG: 500 TABLET ORAL at 22:33

## 2021-07-30 RX ADMIN — PREDNISONE 20 MG: 20 TABLET ORAL at 10:56

## 2021-07-30 RX ADMIN — AMOXICILLIN AND CLAVULANATE POTASSIUM 1 TABLET: 875; 125 TABLET, FILM COATED ORAL at 22:34

## 2021-07-30 RX ADMIN — PANTOPRAZOLE SODIUM 40 MG: 40 TABLET, DELAYED RELEASE ORAL at 16:02

## 2021-07-30 RX ADMIN — BALSALAZIDE DISODIUM 2250 MG: 750 CAPSULE ORAL at 08:19

## 2021-07-30 RX ADMIN — ENOXAPARIN SODIUM 40 MG: 40 INJECTION SUBCUTANEOUS at 08:19

## 2021-07-30 RX ADMIN — METOPROLOL SUCCINATE 25 MG: 25 TABLET, FILM COATED, EXTENDED RELEASE ORAL at 08:19

## 2021-07-30 RX ADMIN — PREDNISONE 20 MG: 20 TABLET ORAL at 22:34

## 2021-07-30 RX ADMIN — DONEPEZIL HYDROCHLORIDE 5 MG: 5 TABLET, FILM COATED ORAL at 22:33

## 2021-07-30 RX ADMIN — SODIUM CHLORIDE: 9 INJECTION, SOLUTION INTRAVENOUS at 03:35

## 2021-07-30 RX ADMIN — LEVETIRACETAM 250 MG: 500 TABLET ORAL at 08:19

## 2021-07-30 ASSESSMENT — PAIN SCALES - PAIN ASSESSMENT IN ADVANCED DEMENTIA (PAINAD)
FACIALEXPRESSION: 0
BREATHING: 0
FACIALEXPRESSION: 0
BODYLANGUAGE: 0
BREATHING: 0
CONSOLABILITY: 0
CONSOLABILITY: 0
NEGVOCALIZATION: 0
BODYLANGUAGE: 0
FACIALEXPRESSION: 0
CONSOLABILITY: 0
TOTALSCORE: 0
TOTALSCORE: 0
BREATHING: 0
BODYLANGUAGE: 0
TOTALSCORE: 0
NEGVOCALIZATION: 0
NEGVOCALIZATION: 0

## 2021-07-30 ASSESSMENT — PAIN SCALES - GENERAL
PAINLEVEL_OUTOF10: 0

## 2021-07-30 NOTE — PLAN OF CARE
Problem: Falls - Risk of:  Goal: Will remain free from falls  Description: Will remain free from falls  7/30/2021 0708 by Monica Mitchell RN  Outcome: Ongoing  Note: Will remain free from falls. Electronically signed by Monica Mitchell RN on 7/30/2021 at 7:08 AM    7/29/2021 2228 by Jacky Desir RN  Outcome: Ongoing  7/29/2021 1846 by Teodora Dong RN  Outcome: Ongoing  Note: Fall risk assessment completed. Fall precautions in place. Call light within reach. Pt educated on calling for assistance before getting up. Walkway free of clutter. Will continue to monitor. Goal: Absence of physical injury  Description: Absence of physical injury  7/30/2021 0708 by Monica Mtichell RN  Outcome: Ongoing  Note: Absence of physical injury. Electronically signed by Monica Mitchell RN on 7/30/2021 at 7:08 AM    7/29/2021 2228 by Jacky Desir RN  Outcome: Ongoing  7/29/2021 1846 by Teodora Dong RN  Outcome: Ongoing  Note: Pt is free of injury. No injury noted. Fall precautions in place. Call light within reach. Will monitor. Problem: Skin Integrity:  Goal: Will show no infection signs and symptoms  Description: Will show no infection signs and symptoms  7/30/2021 0708 by Monica Mitchell RN  Outcome: Ongoing  Note: Will show no signs/symptoms infection. Electronically signed by Monica Mitchell RN on 7/30/2021 at 7:09 AM    7/29/2021 2228 by Jacky Desir RN  Outcome: Ongoing  7/29/2021 1846 by Teodora Dong RN  Outcome: Ongoing  Goal: Absence of new skin breakdown  Description: Absence of new skin breakdown  7/30/2021 0708 by Monica Mitchell RN  Outcome: Ongoing  Note: Absence of new skin breakdown.   Electronically signed by Monica Mitchell RN on 7/30/2021 at 7:09 AM    7/29/2021 2228 by Jacky Desir RN  Outcome: Ongoing  7/29/2021 1846 by Teodora Dong RN  Outcome: Ongoing  Note: No new skin breakdown noted     Problem: Nutrition  Goal: Optimal nutrition therapy  7/30/2021 0708 by Monica Mitchell RN  Outcome: Ongoing  Note: Optimal nutrition therapy. Electronically signed by Abelino Cote RN on 7/30/2021 at 7:09 AM    7/29/2021 2228 by Marcos Handley RN  Outcome: Ongoing  7/29/2021 1846 by Frank Rivera RN  Outcome: Ongoing  Note: Patient tolerating PO intake well.

## 2021-07-30 NOTE — PROGRESS NOTES
INPATIENT PROGRESS NOTE        IDENTIFYING DATA/REASON FOR CONSULTATION   PATIENT:  Ольга Perales  MRN:  [id number]  ADMIT DATE: 7/28/2021  TIME OF EVALUATION: 7/30/2021 7:19 AM  HOSPITAL STAY:   LOS: 2 days   CONSULTING PHYSICIAN: Khushi Herrera MD   REASON FOR CONSULTATION: ulcerative colitis    Subjective:    Patient seen in follow-up for ulcerative colitis. She is pleasantly confused this morning. She reports she continues to have diarrhea. 6 bowel movements recorded yesterday, one recorded thus far today. She denies abdominal pain. Her appetite is good. MEDICATIONS   SCHEDULED:  methylPREDNISolone, 20 mg, Q8H  vancomycin (VANCOCIN) intermittent dosing (placeholder), , RX Placeholder  vancomycin, 1,250 mg, Q18H  atorvastatin, 40 mg, Daily  clopidogrel, 75 mg, Daily  donepezil, 5 mg, Nightly  levETIRAcetam, 250 mg, BID  metoprolol succinate, 25 mg, Daily  lisinopril, 20 mg, Daily  pantoprazole, 40 mg, BID AC  sodium chloride flush, 5-40 mL, 2 times per day  enoxaparin, 40 mg, Daily  balsalazide, 2,250 mg, TID      FLUIDS/DRIPS:     sodium chloride      sodium chloride 75 mL/hr at 07/30/21 0335     PRNs: sodium chloride flush, 5-40 mL, PRN  sodium chloride, 25 mL, PRN  ondansetron, 4 mg, Q8H PRN   Or  ondansetron, 4 mg, Q6H PRN  polyethylene glycol, 17 g, Daily PRN  acetaminophen, 650 mg, Q6H PRN   Or  acetaminophen, 650 mg, Q6H PRN  potassium chloride, 40 mEq, PRN   Or  potassium alternative oral replacement, 40 mEq, PRN   Or  potassium chloride, 10 mEq, PRN  magnesium sulfate, 2,000 mg, PRN      ALLERGIES:    Allergies   Allergen Reactions    Cortisone Other (See Comments)     Injection site site sunk in    Percocet [Oxycodone-Acetaminophen] Nausea And Vomiting         PHYSICAL EXAM   VITALS:  /75   Pulse 75   Temp 97.5 °F (36.4 °C) (Oral)   Resp 17   Ht 4' 10\" (1.473 m)   Wt 163 lb 12.8 oz (74.3 kg)   SpO2 97%   BMI 34.23 kg/m²   TEMPERATURE:  Current - Temp: 97.5 °F (36.4 °C);  Max - Temp  Av.5 °F (36.4 °C)  Min: 97.5 °F (36.4 °C)  Max: 97.5 °F (36.4 °C)    Physical Exam:  General appearance: alert, cooperative, no distress  Eyes: Anicteric  Head: Normocephalic, without obvious abnormality  Lungs: clear to auscultation bilaterally, Normal Effort  Heart: regular rate and rhythm, normal S1 and S2, no murmurs or rubs  Abdomen: soft, non-distended, non-tender. Bowel sounds normal.  Extremities: atraumatic, no cyanosis or edema  Skin: warm and dry, no jaundice  Neuro: Grossly intact, alert, confused    LABS AND IMAGING   Laboratory   Recent Labs     21  1024 21  0917   WBC 13.3* 13.4*   HGB 10.9* 10.1*   HCT 34.0* 32.7*   MCV 63.8* 64.7*    329     Recent Labs     21  1024 21  0514    140   K 2.5* 3.4*    107   CO2 23 21   BUN 12 10   CREATININE 1.0 0.5*     Recent Labs     21  1024   AST 15   ALT <5*   BILITOT 0.4   ALKPHOS 102     Recent Labs     21  1024   LIPASE 17.0     No results for input(s): PROTIME, INR in the last 72 hours. Imaging  CT ABDOMEN PELVIS W IV CONTRAST Additional Contrast? None   Final Result   Diffuse colitis. No evidence of obstruction or perforation. Large hiatal hernia. Endoscopy      ASSESSMENT AND RECOMMENDATIONS   Den John is a 77 y.o. female with PMH of ulcerative colitis, dementia, CAD on plavix, HTN, HLD, pacemaker who presented on 2021 with altered mental status and diarrhea. CT showed diffuse colitis. UA was concerning for UTI. Blood work noted hypokalemia, mild leukocytosis. 1. Colitis, likely UC flare from medication noncompliance    -stool studies neg for cdiff, bacterial infection pending  -started on solumedrol IV 20 mg q8hr   -follows with Dr. Pérez Puga for UC, managed on Balsalazide but does not always take it due to memory issues   2. UTI  -on rocephin  3. Hypokalemia  -Primary team replacing  4.  Dementia  -she lives with her son and has history of not taking her medications as prescribed. ? If pt would benefit/qualify for home health services    RECOMMENDATIONS:   Continue Solumedrol 20 mg q8hrs  Diet as tolerated    If you have any questions or need any further information, please feel free to contact us [phone]. Thank you for allowing us to participate in the care of Rebeca Novak. The note was completed using Dragon voice recognition transcription. Every effort was made to ensure accuracy; however, inadvertent transcription errors may be present despite my best efforts to edit errors. Leonora SCOTT    Attending physician addendum:    I have personally seen and examined the patient, reviewed the patient's medical record and pertinent labs and clinical imaging. I have personally staffed the case with Leonora SCOTT. I agree with her consultation note, exam findings, assessment and plans  as written above. I have made appropriate modifications and edited her assessment and plan where needed to reflect my impression and plans for this patient. Katherine Mayfield is a 78 YO female with a PMH of PMH of Ulcerative colitis, Dementia, CAD on plavix, HTN, HLD, pacemaker who presented on 7/28/2021 with altered mental status and diarrhea. CT showed diffuse colitis. Patient has known UC and follows with Dr. Jerel Caceres. She has issues with compliance with Balsalazide due to memory issues. Continue Solumedrol and Balsalazide. C.diff negative. Patient likely will need placement. Concern her dementia interfers with medication compliance. Thank you for allowing me to participate in this patient's care. If there are any questions or concerns regarding this patient, or the plan we have set in place, please feel free to contact me at [phone].      Abel Alcaraz MD

## 2021-07-30 NOTE — PROGRESS NOTES
Hospitalist Progress Note      PCP: Baldemar Garcia    Date of Admission: 7/28/2021    Chief Complaint: [address] Course: The patient is a 77 y.o. female who presents to [facility] with altered mental status. Patient has a history of dementia, ulcerative colitis, hypertension, hyperlipidemia who presents to the emergency department with altered mental status for a few days. Patient lives with her son after recent admission to the hospital followed by a short stay at a SNF. Patient at that point had diarrhea, JUNI, urinary tract infection along with acute metabolic encephalopathy. Patient arrived to the emergency department covered in feces along with her son. Patient's labs on admission showed a potassium of 2.5, magnesium of 1.5 and a white count of 13. Urinalysis was concerning for urinary tract infection and a CT of her abdomen pelvis showed pancolitis. C. difficile along with GI pathogens ordered. Patient started on IV fluids along with broad-spectrum IV antibiotics. Patient also started on prednisone low-dose for possible ulcerative colitis flareup. GI consulted and she will be admitted to the hospital for further care and intervention    7/29 Patient much more awake and alert today than she was yesterday. Ulcerative colitis flare thought to be secondary to noncompliance.     Subjective: Patient seen and examined. No acute issues, continues to have diarrhea.   Consider Imodium if GI pathogen panel returned negative    Medications:  Reviewed    Infusion Medications    sodium chloride       Scheduled Medications    amoxicillin-clavulanate  1 tablet Oral 2 times per day    predniSONE  20 mg Oral BID    atorvastatin  40 mg Oral Daily    clopidogrel  75 mg Oral Daily    donepezil  5 mg Oral Nightly    levETIRAcetam  250 mg Oral BID    metoprolol succinate  25 mg Oral Daily    lisinopril  20 mg Oral Daily    pantoprazole  40 mg Oral BID AC    sodium chloride flush  5-40 mL Intravenous 2 times per day    enoxaparin  40 mg Subcutaneous Daily    balsalazide  2,250 mg Oral TID     PRN Meds: sodium chloride flush, sodium chloride, ondansetron **OR** ondansetron, polyethylene glycol, acetaminophen **OR** acetaminophen, potassium chloride **OR** potassium alternative oral replacement **OR** potassium chloride, magnesium sulfate      Intake/Output Summary (Last 24 hours) at 7/30/2021 1328  Last data filed at 7/30/2021 1150  Gross per 24 hour   Intake 1518.18 ml   Output --   Net 1518.18 ml       Physical Exam Performed:    BP (!) 174/96   Pulse 74   Temp 98.1 °F (36.7 °C) (Oral)   Resp 14   Ht 4' 10\" (1.473 m)   Wt 163 lb 12.8 oz (74.3 kg)   SpO2 98%   BMI 34.23 kg/m²     General appearance: No apparent distress, appears stated age and cooperative. HEENT: Pupils equal, round, and reactive to light. Conjunctivae/corneas clear. Neck: Supple, with full range of motion. No jugular venous distention. Trachea midline. Respiratory:  Normal respiratory effort. Clear to auscultation  Cardiovascular: Regular rate and rhythm with normal S1/S2   Abdomen: Soft, non-tender, non-distended with normal bowel sounds. Musculoskeletal: No clubbing, cyanosis or edema bilaterally. Skin: Skin color, texture, turgor normal.  No rashes or lesions. Neurologic:  Neurovascularly intact without any focal sensory/motor deficits.  Cranial nerves: II-XII intact, grossly non-focal.  Psychiatric: Alert and confused  Capillary Refill: Brisk,< 3 seconds   Peripheral Pulses: +2 palpable, equal bilaterally       Labs:   Recent Labs     07/28/21  1024 07/29/21  0917   WBC 13.3* 13.4*   HGB 10.9* 10.1*   HCT 34.0* 32.7*    329     Recent Labs     07/28/21  1024 07/29/21  0514    140   K 2.5* 3.4*    107   CO2 23 21   BUN 12 10   CREATININE 1.0 0.5*   CALCIUM 8.1* 7.9*     Recent Labs     07/28/21  1024   AST 15   ALT <5*   BILITOT 0.4   ALKPHOS 102     No results for input(s): INR in the last 72 hours. No results for input(s): Robert Breeze in the last 72 hours. Urinalysis:      Lab Results   Component Value Date    NITRU Negative 07/28/2021    WBCUA 11 07/28/2021    BACTERIA 4+ 07/28/2021    RBCUA 2 07/28/2021    BLOODU Negative 07/28/2021    SPECGRAV 1.018 07/28/2021    GLUCOSEU Negative 07/28/2021    GLUCOSEU NEGATIVE 11/02/2010       Radiology:  CT ABDOMEN PELVIS W IV CONTRAST Additional Contrast? None   Final Result   Diffuse colitis. No evidence of obstruction or perforation. Large hiatal hernia. Assessment/Plan:    Diarrhea  C. difficile neg, GI pathogens pending  GI consulted  CT with diffuse colitis  Possible ulcerative colitis flare  Solumedrol 20mg q8, changed to prednisone 20 mg every 8 due to no IV access     Urinary tract infection  Await culture and sensitivity: enteroccus faecium  Continue Rocephin, transition to oral Augmentin     Acute metabolic encephalopathy  Likely secondary to above  Treat underlying conditions  resolving     Hypomagnesemia  Replete and recheck  resolved     Hypokalemia  Replete and recheck    DVT Prophylaxis: lovenox  Diet: ADULT DIET;  Regular  Adult Oral Nutrition Supplement; Clear Liquid Oral Supplement  Code Status: Full Code    PT/OT Eval Status: Ordered    Dispo - Santos Adamson MD

## 2021-07-30 NOTE — PROGRESS NOTES
Loss of IV access. 3 failed attempts by RNs. Nursing supervisor and ultrasound guided RN notified. Pending insertion at this time.  Dr. Soto James made aware

## 2021-07-30 NOTE — PLAN OF CARE
Problem: Falls - Risk of:  Goal: Will remain free from falls  Description: Will remain free from falls  7/29/2021 2228 by Gerri Rosario RN  Outcome: Ongoing     Problem: Falls - Risk of:  Goal: Absence of physical injury  Description: Absence of physical injury  7/29/2021 2228 by Gerri Rosario RN  Outcome: Ongoing     Problem: Skin Integrity:  Goal: Will show no infection signs and symptoms  Description: Will show no infection signs and symptoms  7/29/2021 2228 by Gerri Rosario RN  Outcome: Ongoing     Problem: Skin Integrity:  Goal: Absence of new skin breakdown  Description: Absence of new skin breakdown  7/29/2021 2228 by Gerri Rosario RN  Outcome: Ongoing     Problem: Nutrition  Goal: Optimal nutrition therapy  7/29/2021 2228 by Gerri Rosario RN  Outcome: Ongoing

## 2021-07-30 NOTE — PROGRESS NOTES
Physical Therapy    Facility/Department: [facility] ORTHOPEDICS  Initial Assessment    NAME: Lamar Hickman  : 1945  MRN: [id number]    Date of Service: 2021    Discharge Recommendations:  Continue to assess pending progress    see assessment    Assessment   Body structures, Functions, Activity limitations: Decreased functional mobility ; Decreased balance  Assessment: The patient is a 77 y.o. female who presents to [facility] with altered mental status. Patient has a history of dementia, ulcerative colitis, hypertension, hyperlipidemia who presents to the emergency department on 21 with altered mental status for a few days. Patient lives with her son after recent admission to the hospital followed by a short stay at a SNF. Prior to admission, pt living with son in house - reports she was independent with all ADLs, IADLs, and ambulation without device, however she reports she falls 5 times per week and is unable to get self up. Pt currently able to perform bed mobility independently, sit<>stand with supv, and ambulate 60' with SBA. Unsure why pt continues to fall - claims she trips over things - pt perseverates on her inability to get back up from the floor vs why she is falling and how we can prevent that. The pt will need 24hr supv due to her son's inability to assist when pt falls. If son cannot provide 24hr then she will require continued skilled therapy 3-5x/week. Treatment Diagnosis: impaired dynamic balance  Prognosis: Fair  Decision Making: Medium Complexity  History: see below  Exam: see below  Clinical Presentation: evolving  PT Education: PT Role;Plan of Care;Transfer Training;General Safety; Functional Mobility Training;Gait Training  REQUIRES PT FOLLOW UP: Yes  Activity Tolerance  Activity Tolerance: Patient Tolerated treatment well       Patient Diagnosis(es): The primary encounter diagnosis was Diarrhea, unspecified type.  Diagnoses of Abdominal pain, epigastric, Dehydration, and Hypokalemia were also pertinent to this visit. has a past medical history of Acute MI ([address] Eczema, [place] (hard of hearing), Hypercholesteremia, Hypertension, Pacemaker, Ulcerative colitis, and Wears hearing aid. has a past surgical history that includes Hysterectomy (1996); Pacemaker insertion; Coronary angioplasty with stent; Ankle fracture surgery (7/26/11); rhinoplasty; Upper gastrointestinal endoscopy (N/A, 1/20/2020); and Colonoscopy (N/A, 1/20/2020). Restrictions  Restrictions/Precautions  Restrictions/Precautions: Fall Risk     Vision/Hearing  Hearing: Exceptions to [facility]  Hearing Exceptions: Hard of hearing/hearing concerns       Subjective  General  Chart Reviewed: Yes  Patient assessed for rehabilitation services?: Yes  Additional Pertinent Hx: The patient is a 77 y.o. female who presents to [facility] with altered mental status. Patient has a history of dementia, ulcerative colitis, hypertension, hyperlipidemia who presents to the emergency department on 7/28/21 with altered mental status for a few days. Patient lives with her son after recent admission to the hospital followed by a short stay at a SNF. Response To Previous Treatment: Not applicable  Family / Caregiver Present: No  Referring Practitioner: Brennan Felty, MD  Referral Date : 07/30/21  Diagnosis: AMS - poss UTI  Follows Commands: Within Functional Limits  Subjective  Subjective: Pt is agreeable to PT - repeatedly states \"my problem is that when I fall I cannot get back up, my son cannot help me\". Pt unable to understand that the root cause is the fall, not the inability to get up from the fall.           Orientation  Orientation  Overall Orientation Status: Impaired  Orientation Level: Oriented to place;Oriented to situation;Disoriented to time;Oriented to person     Social/Functional History  Social/Functional History  Lives With: Son (son with hx of CVA and left sided hemiplegia)  Type of Home: House  Home Layout: One level  Home Access: Stairs to enter without rails  Entrance Stairs - Number of Steps: 1+1  Bathroom Shower/Tub: Tub/Shower unit  Bathroom Toilet: Standard  Bathroom Equipment: Shower chair  Bathroom Accessibility: Wheelchair accessible  Home Equipment: Rolling walker  ADL Assistance: Independent  Homemaking Assistance: Independent  Ambulation Assistance: Independent (no device)  Transfer Assistance: Independent  Active : No  Patient's  Info: son drives  Occupation: Retired  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 Pt reporting she falls 5 times per week - unable to get self up and son unable to assist.     Cognition   Cognition  Overall Cognitive Status: Exceptions  Arousal/Alertness: Appropriate responses to stimuli  Following Commands: Follows one step commands with repetition  Attention Span: Attends with cues to redirect  Safety Judgement: Decreased awareness of need for safety;Decreased awareness of need for assistance  Insights: Not aware of deficits  Initiation: Requires cues for some    Objective  Strength RLE  Strength RLE: WFL  Strength LLE  Strength LLE: WFL  Motor Control  Gross Motor?: WFL     Bed mobility  Supine to Sit: Modified independent  Sit to Supine: Unable to assess (in recliner at end of session)  Scooting: Independent  Transfers  Sit to Stand: Supervision  Stand to sit: Supervision  Ambulation  Ambulation?: Yes  Ambulation 1  Surface: level tile  Device: No Device  Assistance: Stand by assistance;Contact guard assistance  Gait Deviations: Decreased step length;Decreased step height  Distance: 60'  Comments: No overt LOB.      Balance  Posture: Good  Sitting - Static: Good  Sitting - Dynamic: Good  Standing - Static: Good  Standing - Dynamic: Good;-        Plan   Plan  Times per week: 2-3x/week  Current Treatment Recommendations: Balance Training, Functional Mobility Training, Gait Training, Neuromuscular Re-education, Safety Education & Training, Patient/Caregiver Education & Training  Safety Devices  Type of devices:  All fall risk precautions in place, Call light within reach, Gait belt, Patient at risk for falls, Left in chair, Chair alarm in place, Nurse notified    AM-PAC Score  AM-PAC Inpatient Mobility Raw Score : 22 (07/30/21 1027)  AM-PAC Inpatient T-Scale Score : 53.28 (07/30/21 1027)  Mobility Inpatient CMS 0-100% Score: 20.91 (07/30/21 1027)  Mobility Inpatient CMS G-Code Modifier : Malika Sonia (07/30/21 1027)          Goals  Short term goals  Time Frame for Short term goals: by acute discharge  Short term goal 1: sit<>stand independently  Short term goal 2: ambulate > 50' independently  Patient Goals   Patient goals : none stated       Therapy Time   Individual Concurrent Group Co-treatment   Time In 0930         Time Out 1020         Minutes 50         Timed Code Treatment Minutes: 40 Minutes       Nicolasa Murillo PT

## 2021-07-31 LAB — GI BACTERIAL PATHOGENS BY PCR: NORMAL

## 2021-07-31 PROCEDURE — 2580000003 HC RX 258: Performed by: INTERNAL MEDICINE

## 2021-07-31 PROCEDURE — 94760 N-INVAS EAR/PLS OXIMETRY 1: CPT

## 2021-07-31 PROCEDURE — 1200000000 HC SEMI PRIVATE

## 2021-07-31 PROCEDURE — 6370000000 HC RX 637 (ALT 250 FOR IP): Performed by: INTERNAL MEDICINE

## 2021-07-31 PROCEDURE — 6360000002 HC RX W HCPCS: Performed by: INTERNAL MEDICINE

## 2021-07-31 PROCEDURE — 97535 SELF CARE MNGMENT TRAINING: CPT

## 2021-07-31 PROCEDURE — 97166 OT EVAL MOD COMPLEX 45 MIN: CPT

## 2021-07-31 RX ORDER — LOPERAMIDE HYDROCHLORIDE 2 MG/1
2 CAPSULE ORAL 4 TIMES DAILY PRN
Status: DISCONTINUED | OUTPATIENT
Start: 2021-07-31 | End: 2021-08-05 | Stop reason: HOSPADM

## 2021-07-31 RX ADMIN — AMOXICILLIN AND CLAVULANATE POTASSIUM 1 TABLET: 875; 125 TABLET, FILM COATED ORAL at 09:41

## 2021-07-31 RX ADMIN — BALSALAZIDE DISODIUM 2250 MG: 750 CAPSULE ORAL at 09:42

## 2021-07-31 RX ADMIN — DONEPEZIL HYDROCHLORIDE 5 MG: 5 TABLET, FILM COATED ORAL at 22:45

## 2021-07-31 RX ADMIN — PREDNISONE 20 MG: 20 TABLET ORAL at 09:41

## 2021-07-31 RX ADMIN — LEVETIRACETAM 250 MG: 500 TABLET ORAL at 22:45

## 2021-07-31 RX ADMIN — BALSALAZIDE DISODIUM 2250 MG: 750 CAPSULE ORAL at 22:45

## 2021-07-31 RX ADMIN — PREDNISONE 20 MG: 20 TABLET ORAL at 22:45

## 2021-07-31 RX ADMIN — BALSALAZIDE DISODIUM 2250 MG: 750 CAPSULE ORAL at 14:56

## 2021-07-31 RX ADMIN — LISINOPRIL 20 MG: 20 TABLET ORAL at 09:42

## 2021-07-31 RX ADMIN — CLOPIDOGREL BISULFATE 75 MG: 75 TABLET ORAL at 09:42

## 2021-07-31 RX ADMIN — ATORVASTATIN CALCIUM 40 MG: 40 TABLET, FILM COATED ORAL at 09:41

## 2021-07-31 RX ADMIN — PANTOPRAZOLE SODIUM 40 MG: 40 TABLET, DELAYED RELEASE ORAL at 06:56

## 2021-07-31 RX ADMIN — LOPERAMIDE HYDROCHLORIDE 2 MG: 2 CAPSULE ORAL at 09:41

## 2021-07-31 RX ADMIN — METOPROLOL SUCCINATE 25 MG: 25 TABLET, FILM COATED, EXTENDED RELEASE ORAL at 09:41

## 2021-07-31 RX ADMIN — PANTOPRAZOLE SODIUM 40 MG: 40 TABLET, DELAYED RELEASE ORAL at 17:05

## 2021-07-31 RX ADMIN — AMOXICILLIN AND CLAVULANATE POTASSIUM 1 TABLET: 875; 125 TABLET, FILM COATED ORAL at 22:45

## 2021-07-31 RX ADMIN — LEVETIRACETAM 250 MG: 500 TABLET ORAL at 09:41

## 2021-07-31 RX ADMIN — ENOXAPARIN SODIUM 40 MG: 40 INJECTION SUBCUTANEOUS at 09:42

## 2021-07-31 ASSESSMENT — PAIN SCALES - PAIN ASSESSMENT IN ADVANCED DEMENTIA (PAINAD)
BODYLANGUAGE: 0
CONSOLABILITY: 0
BODYLANGUAGE: 0
FACIALEXPRESSION: 0
TOTALSCORE: 0
BREATHING: 0
NEGVOCALIZATION: 0
BREATHING: 0
CONSOLABILITY: 0
FACIALEXPRESSION: 0
TOTALSCORE: 0
NEGVOCALIZATION: 0

## 2021-07-31 ASSESSMENT — PAIN DESCRIPTION - LOCATION: LOCATION: ABDOMEN

## 2021-07-31 ASSESSMENT — PAIN DESCRIPTION - PAIN TYPE: TYPE: ACUTE PAIN

## 2021-07-31 ASSESSMENT — PAIN SCALES - GENERAL
PAINLEVEL_OUTOF10: 0
PAINLEVEL_OUTOF10: 0

## 2021-07-31 NOTE — PROGRESS NOTES
Pt rounded on this morning Q2h, whiteboard updated, pt given ice water and needs assessed. Pt sitting up in bed, walking x1 assist to and from the bathroom. PT c/o multiple episodes of diarrhea and incontinence at times. Pt given PRN imodium per MD orders (see eMAR). Pt denies pain, has no further needs or concerns at this time. Call light within reach, pt verbalized understanding to call prior to ambulating. Will continue to monitor and reassess.    Electronically signed by Bhupinder Francis RN on 7/31/2021 at 3:13 PM

## 2021-07-31 NOTE — PROGRESS NOTES
Pt did not sleep well overnight due to getting up frequently to use the BR. She had frequent small loose stools. Remains AAO x4. Denies any needs. Will continue to monitor.

## 2021-07-31 NOTE — PLAN OF CARE
Problem: Falls - Risk of:  Goal: Will remain free from falls  Description: Will remain free from falls  7/31/2021 1715 by Love Bonilla RN  Outcome: Ongoing   Fall risk assessment completed. Fall precautions in place. Call light within reach. Pt educated on calling for assistance before getting up. Walkway free of clutter. Will continue to monitor. Electronically signed by Love Bonilla RN on 7/31/2021 at 5:15 PM    Problem: Falls - Risk of:  Goal: Absence of physical injury  Description: Absence of physical injury  7/31/2021 1715 by Love Bonilla RN  Outcome: Ongoing   Pt is free of injury. No injury noted. Fall precautions in place. Call light within reach. Will monitor. Electronically signed by Love Bonilla RN on 7/31/2021 at 5:16 PM    Problem: Skin Integrity:  Goal: Will show no infection signs and symptoms  Description: Will show no infection signs and symptoms  7/31/2021 1715 by Love Bonilla RN  Outcome: Ongoing   Pt is free of signs and symptoms of infection. Vital signs stable. Will monitor. Electronically signed by Love Bonilla RN on 7/31/2021 at 5:16 PM    Problem: Skin Integrity:  Goal: Absence of new skin breakdown  Description: Absence of new skin breakdown  7/31/2021 1715 by Love Bonilla RN  Outcome: Ongoing   Skin assessment completed. No skin breakdown noted. Pt reminded to turn and reposition frequently. Will continue to monitor and reassess. Electronically signed by Love Bonilla RN on 7/31/2021 at 5:16 PM    Problem: Nutrition  Goal: Optimal nutrition therapy  7/31/2021 1715 by Love Bonilla RN  Outcome: Ongoing   Nutrition Problem #1: Inadequate oral intake  Intervention: Food and/or Nutrient Delivery: Continue Current Diet, Start Oral Nutrition Supplement  Nutritional Goals:  Tolerate diet and consume greater than 50% of meals and supplements

## 2021-07-31 NOTE — PROGRESS NOTES
Occupational Therapy   Occupational Therapy Initial Assessment  Date: 2021   Patient Name: Jennifer Hooper  MRN: [id number]     : 1945    Date of Service: 2021    Assessment: Pt is 77 y.o. F who presents s/p mechanical fall. Presents covered in feces with some confusion - unsure of baseline cognition. Pt found to ahve UTI and pancolitis. Pt with history of dementia. PTA pt lives with son in one story home with 2 KIMBERLY. Pt reports independence in self-care tasks, homemaking responsibilities, and functional mobility with device. Pt reports her son had CVA with L hemiparesis and is unable to assist her. Currently, pt presents with ROM/strength in [facility] for self-care and transfers. Pt completed bed mobility mod I and sit <> stand transfers with SBA/SPV. Pt completed functional mobility with RW with SBA, no overt LOB however reporting frequent falls. Pt with some decreased cognition - unsure of baseline, partially oriented. Pt completed toileting and hand washing with SBA and required min A for LB dressing. Per chart, son is hoping for placement d/t inability to assist patient. Pt would require 24/7 supervision/assist - if family is unable to provide will require skilled care. Discharge Recommendations:  Continue to assess pending progress  OT Equipment Recommendations  Other: TBD    Assessment   Performance deficits / Impairments: Decreased functional mobility ; Decreased strength;Decreased endurance;Decreased ADL status; Decreased safe awareness;Decreased cognition;Decreased balance  Assessment: Pt is 77 y.o. F who presents s/p mechanical fall. Presents covered in feces with some confusion - unsure of baseline cognition. Pt found to ahve UTI and pancolitis. Pt with history of dementia. PTA pt lives with son in one story home with 2 KIMBERLY. Pt reports independence in self-care tasks, homemaking responsibilities, and functional mobility with device.  Pt reports her son had CVA with L hemiparesis and is unable to assist her. Currently, pt presents with ROM/strength in [facility] for self-care and transfers. Pt completed bed mobility mod I and sit <> stand transfers with SBA/SPV. Pt completed functional mobility with RW with SBA, no overt LOB however reporting frequent falls. Pt with some decreased cognition - unsure of baseline, partially oriented. Pt completed toileting and hand washing with SBA and required min A for LB dressing. Per chart, son is hoping for placement d/t inability to assist patient. Pt would require 24/7 supervision/assist - if family is unable to provide will require skilled care. Prognosis: Fair  Decision Making: Medium Complexity  History: PMH: dementia, HTN, Ulcerative colitis  Exam: ADLs, transfers, func mob, bed mob  Assistance / Modification: SBA/SPV for mobility and min A for ADLs  OT Education: OT Role;Transfer Training;Plan of Care;Precautions; ADL Adaptive Strategies  Barriers to Learning: Cognition  REQUIRES OT FOLLOW UP: Yes  Activity Tolerance  Activity Tolerance: Patient Tolerated treatment well;Treatment limited secondary to decreased cognition  Safety Devices  Safety Devices in place: Yes (TERELL Mar) notified)  Type of devices: Nurse notified;Gait belt;Call light within reach; Chair alarm in place; Left in chair           Patient Diagnosis(es): The primary encounter diagnosis was Diarrhea, unspecified type. Diagnoses of Abdominal pain, epigastric, Dehydration, and Hypokalemia were also pertinent to this visit. has a past medical history of Acute MI ([address] Eczema, [place] (hard of hearing), Hypercholesteremia, Hypertension, Pacemaker, Ulcerative colitis, and Wears hearing aid. has a past surgical history that includes Hysterectomy (1996); Pacemaker insertion; Coronary angioplasty with stent; Ankle fracture surgery (7/26/11); rhinoplasty; Upper gastrointestinal endoscopy (N/A, 1/20/2020); and Colonoscopy (N/A, 1/20/2020). Restrictions  Restrictions/Precautions  Restrictions/Precautions: Fall Risk    Subjective   General  Chart Reviewed: Yes  Patient assessed for rehabilitation services?: Yes  Additional Pertinent Hx: Per Kourtney Terrazas MD: Pt is \"66 y.o. female who presents to [facility] with altered mental status. Patient has a history of dementia, ulcerative colitis, hypertension, hyperlipidemia who presents to the emergency department with altered mental status for a few days. Patient lives with her son after recent admission to the hospital followed by a short stay at a SNF. Patient at that point had diarrhea, JUNI, urinary tract infection along with acute metabolic encephalopathy. Patient arrived to the emergency department covered in feces along with her son. Patient's labs on admission showed a potassium of 2.5, magnesium of 1.5 and a white count of 13. Urinalysis was concerning for urinary tract infection and a CT of her abdomen pelvis showed pancolitis. \"  Family / Caregiver Present: No  Referring Practitioner: Jonathan Hoyt MD  Diagnosis: Acute metabolic encephalopathy  Subjective  Subjective: Pt met bedside, call light blinking and pt climbing to EOB. Pt reporting she needs to use restroom. Pt somewhat oriented - unsure of baseline cognition. Pt is very [place].   Patient Currently in Pain:  (No complaints of pain)  Vital Signs  Patient Currently in Pain:  (No complaints of pain)     Social/Functional History  Social/Functional History  Lives With: Son (son with hx of CVA and left sided hemiplegia)  Type of Home: House  Home Layout: One level  Home Access: Stairs to enter without rails  Entrance Stairs - Number of Steps: 1+1  Bathroom Shower/Tub: Tub/Shower unit  Bathroom Toilet: Standard  Bathroom Equipment: Shower chair  Bathroom Accessibility: Wheelchair accessible  Home Equipment: Rolling walker  ADL Assistance: Independent  Homemaking Assistance: Independent  Ambulation Assistance: Independent (no device)  Transfer Assistance: Independent  Active : No  Patient's  Info: son drives  Occupation: Retired  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 Pt reporting she falls 5 times per week - unable to get self up and son unable to assist.       Objective   Vision: Within Functional Limits  Hearing: Exceptions to [facility]  Hearing Exceptions: Hard of hearing/hearing concerns      Orientation  Overall Orientation Status: Impaired  Orientation Level: Oriented to person;Oriented to place; Disoriented to situation (partial orientation to time)     Balance  Sitting Balance: Supervision  Standing Balance: Stand by assistance  Standing Balance  Time: ~2-3 minutes  Activity: Func mob, transfers, and ADL tasks    Functional Mobility  Functional - Mobility Device: Rolling Walker  Activity: To/from bathroom  Assist Level: Stand by assistance  Functional Mobility Comments: Pt completed functional mobility with RW with SBA, no overt LOB. Required cues for RW management - typically does not use RW. Pt reporting several frequent falls d/t loss of balance. Toilet Transfers  Toilet - Technique: Ambulating (RW)  Equipment Used: Grab bars (Comfort height commode)  Toilet Transfer: Stand by assistance;Supervision    ADL  Feeding:  (Some assist opening packages on breakfast tray)  Grooming: Stand by assistance (Pt washed hands at sink with SBA)  LE Dressing: Minimal assistance (Able to doff soiled depends, assist to thread underwear - pt manated over hips)  Toileting: Stand by assistance (Pt managed clothing and pericare with SBA)  Additional Comments: PTA pt reports independence in self-care tasks. Per chart, pt arrived covered in feces. Unsure of thoroughness of ADLs or accuracy of information provided.  REports son is not able to assist d/t L hemiparesis from CVA. Anticipate pt to require up to min A for LB dressing. Declined further ADL needs d/t arrival of breakfast tray. Tone RUE  RUE Tone: Normotonic  Tone LUE  LUE Tone: Normotonic  Coordination  Movements Are Fluid And Coordinated: Yes     Bed mobility  Supine to Sit: Modified independent  Sit to Supine: Unable to assess (in recliner at end of session)  Scooting: Independent     Transfers  Sit to stand: Stand by assistance;Supervision  Stand to sit: Stand by assistance;Supervision  Transfer Comments: SBA/SPV for sit <> stand from EOB to RW and sit to recliner chair     Cognition  Overall Cognitive Status: Exceptions  Arousal/Alertness: Appropriate responses to stimuli  Following Commands:  Follows one step commands with repetition  Attention Span: Attends with cues to redirect  Safety Judgement: Decreased awareness of need for safety;Decreased awareness of need for assistance  Insights: Not aware of deficits  Initiation: Requires cues for some        Sensation  Overall Sensation Status:  (Not formally assessed)        LUE AROM (degrees)  LUE AROM : WFL  Left Hand AROM (degrees)  Left Hand AROM: WFL  RUE AROM (degrees)  RUE AROM : WFL  Right Hand AROM (degrees)  Right Hand AROM: WFL     LUE Strength  Gross LUE Strength: WFL  RUE Strength  Gross RUE Strength: WFL          Plan   Plan  Times per week: 3-5  Current Treatment Recommendations: Strengthening, Endurance Training, Balance Training, Functional Mobility Training, Safety Education & Training, Equipment Evaluation, Education, & procurement, Patient/Caregiver Education & Training, Self-Care / ADL      AM-PAC Score   Continue to assess pending family's ability to assist.      AM-PAC Inpatient Daily Activity Raw Score: 18 (07/31/21 0811)  AM-PAC Inpatient ADL T-Scale Score : 38.66 (07/31/21 0811)  ADL Inpatient CMS 0-100% Score: 46.65 (07/31/21 0811)  ADL Inpatient CMS G-Code Modifier : CK (07/31/21 6800)    Goals  Short term goals  Time Frame for Short term goals: prior to d/c  Short term goal 1: Pt will complete functional mobility and transfers with SPV  Short term goal 2: Pt will complete toileting with SPV  Short term goal 3: Pt will complete dressing with SPV  Short term goal 4: Pt will complete bathing with SPV  Patient Goals   Patient goals : \"to go home\"       Therapy Time   Individual Concurrent Group Co-treatment   Time In 0745         Time Out 0815         Minutes 30         Timed Code Treatment Minutes: 15 Minutes (15 min eval)     If pt is discharged prior to next OT session, this note will serve as the discharge summary.     Constantino Capone, [facility]/T#[id number]

## 2021-07-31 NOTE — PROGRESS NOTES
Hospitalist Progress Note      PCP: Neelima Juaerz    Date of Admission: 7/28/2021    Chief Complaint: [address] Course: The patient is a 77 y.o. female who presents to [facility] with altered mental status. Patient has a history of dementia, ulcerative colitis, hypertension, hyperlipidemia who presents to the emergency department with altered mental status for a few days. Patient lives with her son after recent admission to the hospital followed by a short stay at a SNF. Patient at that point had diarrhea, JNUI, urinary tract infection along with acute metabolic encephalopathy. Patient arrived to the emergency department covered in feces along with her son. Patient's labs on admission showed a potassium of 2.5, magnesium of 1.5 and a white count of 13. Urinalysis was concerning for urinary tract infection and a CT of her abdomen pelvis showed pancolitis. C. difficile along with GI pathogens ordered. Patient started on IV fluids along with broad-spectrum IV antibiotics. Patient also started on prednisone low-dose for possible ulcerative colitis flareup. GI consulted and she will be admitted to the hospital for further care and intervention    7/29 Patient much more awake and alert today than she was yesterday. Ulcerative colitis flare thought to be secondary to noncompliance. 7/30 No acute issues, continues to have diarrhea. Consider Imodium if GI pathogen panel returned negative     Subjective: Patient seen and examined. Still having diarrhea. Started on Imodium as C. difficile and GI pathogens are both negative. Unfortunately we cannot change the patient's oral Augmentin as her cultures grow Enterococcus sensitive to only vancomycin and ampicillin. Patient lost IV access days ago and was a hard stick. I do not think it is worth placing a PICC line to continue to get IV antibiotics.      Medications:  Reviewed    Infusion Medications    sodium chloride       Scheduled Labs     07/28/21  1024 07/29/21  0917   WBC 13.3* 13.4*   HGB 10.9* 10.1*   HCT 34.0* 32.7*    329     Recent Labs     07/28/21  1024 07/29/21  0514    140   K 2.5* 3.4*    107   CO2 23 21   BUN 12 10   CREATININE 1.0 0.5*   CALCIUM 8.1* 7.9*     Recent Labs     07/28/21  1024   AST 15   ALT <5*   BILITOT 0.4   ALKPHOS 102     No results for input(s): INR in the last 72 hours. No results for input(s): [place] in the last 72 hours. Urinalysis:      Lab Results   Component Value Date    NITRU Negative 07/28/2021    WBCUA 11 07/28/2021    BACTERIA 4+ 07/28/2021    RBCUA 2 07/28/2021    BLOODU Negative 07/28/2021    SPECGRAV 1.018 07/28/2021    GLUCOSEU Negative 07/28/2021    GLUCOSEU NEGATIVE 11/02/2010       Radiology:  CT ABDOMEN PELVIS W IV CONTRAST Additional Contrast? None   Final Result   Diffuse colitis. No evidence of obstruction or perforation. Large hiatal hernia. Assessment/Plan:    Diarrhea  C. difficile neg, GI pathogens negative  Imodium started  CT with diffuse colitis  Possible ulcerative colitis flare  Solumedrol 20mg q8, changed to prednisone 20 mg every 8 due to no IV access     Urinary tract infection  Await culture and sensitivity: enteroccus faecium  Continue Rocephin, transition to oral Augmentin     Acute metabolic encephalopathy  Likely secondary to above  Treat underlying conditions  resolving     Hypomagnesemia  Replete and recheck  resolved     Hypokalemia  Replete and recheck    DVT Prophylaxis: lovenox  Diet: ADULT DIET;  Regular  Adult Oral Nutrition Supplement; Clear Liquid Oral Supplement  Code Status: Full Code    PT/OT Eval Status: 3-5 sessions per week    Triston España MD

## 2021-07-31 NOTE — PROGRESS NOTES
Effort  Heart: regular rate and rhythm, normal S1 and S2, no murmurs or rubs  Abdomen: soft, non-distended, non-tender. Bowel sounds normal.  Extremities: atraumatic, no cyanosis or edema  Skin: warm and dry, no jaundice  Neuro: Grossly intact, alert, confused    LABS AND IMAGING   Laboratory   Recent Labs     07/28/21  1024 07/29/21  0917   WBC 13.3* 13.4*   HGB 10.9* 10.1*   HCT 34.0* 32.7*   MCV 63.8* 64.7*    329     Recent Labs     07/28/21  1024 07/29/21  0514    140   K 2.5* 3.4*    107   CO2 23 21   BUN 12 10   CREATININE 1.0 0.5*     Recent Labs     07/28/21  1024   AST 15   ALT <5*   BILITOT 0.4   ALKPHOS 102     Recent Labs     07/28/21  1024   LIPASE 17.0     No results for input(s): PROTIME, INR in the last 72 hours. Imaging  CT ABDOMEN PELVIS W IV CONTRAST Additional Contrast? None   Final Result   Diffuse colitis. No evidence of obstruction or perforation. Large hiatal hernia. Endoscopy      ASSESSMENT AND RECOMMENDATIONS   Darien Melton is a 76 YO female with a PMH of PMH of Ulcerative colitis, Dementia, CAD on plavix, HTN, HLD, pacemaker who presented on 7/28/2021 with altered mental status and diarrhea. CT showed diffuse colitis. Patient has known UC and follows with Dr. Huey Bartholomew. She has issues with compliance with Balsalazide due to memory issues    1. Colitis, likely UC flare from medication noncompliance    -stool studies neg for cdiff, bacterial infection pending  -started on solumedrol IV 20 mg q8hr   -follows with Dr. Huey Bartholomew for UC, managed on Balsalazide but does not always take it due to memory issues   2. UTI  -on Rocephin  3. Hypokalemia  -Primary team replacing  4. Dementia  -She lives with her son and has history of not taking her medications as prescribed. ? If pt would benefit/qualify for home health services    RECOMMENDATIONS:   -Continue Solumedrol and Balsalazide as ordered. C.diff negative.  Continues to have a significant amount of diarrhea. Can we switch Augmentin to an alternative antibiotic? Augmentin is the worst antibiotic for antibiotic associated diarrhea and this could be contributing to persistent diarrhea. She has responded to Balsalazide in the past. Patient to be DC to SNF. Thank you for allowing me to participate in this patient's care. If there are any questions or concerns regarding this patient, or the plan we have set in place, please feel free to contact me at [phone].      Kimberly Ballesteros MD

## 2021-07-31 NOTE — PLAN OF CARE
Problem: Falls - Risk of:  Goal: Will remain free from falls  Description: Will remain free from falls  Outcome: Ongoing  Goal: Absence of physical injury  Description: Absence of physical injury  Outcome: Ongoing   Fall risk assessment completed every shift. All precautions in place. Pt has call light within reach at all times. Room clear of clutter. Pt aware to call for assistance when getting up. Problem: Skin Integrity:  Goal: Will show no infection signs and symptoms  Description: Will show no infection signs and symptoms  Outcome: Ongoing  Goal: Absence of new skin breakdown  Description: Absence of new skin breakdown  Outcome: Ongoing   Skin assessment completed every shift. Pt assessed for incontinence, appropriate barrier wipes used prn. Pt encouraged to turn/rotate every 2 hours. Assistance provided if pt unable to do so themselves.       Problem: Nutrition  Goal: Optimal nutrition therapy  Outcome: Ongoing

## 2021-08-01 LAB
ALBUMIN SERPL-MCNC: 3.1 G/DL (ref 3.4–5)
ANION GAP SERPL CALCULATED.3IONS-SCNC: 12 MMOL/L (ref 3–16)
BUN BLDV-MCNC: 19 MG/DL (ref 7–20)
CALCIUM SERPL-MCNC: 8.1 MG/DL (ref 8.3–10.6)
CHLORIDE BLD-SCNC: 107 MMOL/L (ref 99–110)
CO2: 23 MMOL/L (ref 21–32)
CREAT SERPL-MCNC: 0.7 MG/DL (ref 0.6–1.2)
GFR AFRICAN AMERICAN: >60
GFR NON-AFRICAN AMERICAN: >60
GLUCOSE BLD-MCNC: 192 MG/DL (ref 70–99)
PHOSPHORUS: 2.7 MG/DL (ref 2.5–4.9)
POTASSIUM SERPL-SCNC: 2.8 MMOL/L (ref 3.5–5.1)
SODIUM BLD-SCNC: 142 MMOL/L (ref 136–145)

## 2021-08-01 PROCEDURE — 94760 N-INVAS EAR/PLS OXIMETRY 1: CPT

## 2021-08-01 PROCEDURE — 1200000000 HC SEMI PRIVATE

## 2021-08-01 PROCEDURE — 80069 RENAL FUNCTION PANEL: CPT

## 2021-08-01 PROCEDURE — 6370000000 HC RX 637 (ALT 250 FOR IP): Performed by: INTERNAL MEDICINE

## 2021-08-01 PROCEDURE — 6360000002 HC RX W HCPCS: Performed by: INTERNAL MEDICINE

## 2021-08-01 PROCEDURE — 85025 COMPLETE CBC W/AUTO DIFF WBC: CPT

## 2021-08-01 PROCEDURE — 36415 COLL VENOUS BLD VENIPUNCTURE: CPT

## 2021-08-01 RX ORDER — PREDNISONE 10 MG/1
TABLET ORAL
Qty: 30 TABLET | Refills: 0 | DISCHARGE
Start: 2021-08-01 | End: 2021-08-03

## 2021-08-01 RX ORDER — LOPERAMIDE HYDROCHLORIDE 2 MG/1
2 CAPSULE ORAL 4 TIMES DAILY PRN
Qty: 40 CAPSULE | Refills: 0 | DISCHARGE
Start: 2021-08-01 | End: 2021-08-11

## 2021-08-01 RX ADMIN — CLOPIDOGREL BISULFATE 75 MG: 75 TABLET ORAL at 10:12

## 2021-08-01 RX ADMIN — AMOXICILLIN AND CLAVULANATE POTASSIUM 1 TABLET: 875; 125 TABLET, FILM COATED ORAL at 10:11

## 2021-08-01 RX ADMIN — PREDNISONE 20 MG: 20 TABLET ORAL at 20:32

## 2021-08-01 RX ADMIN — PANTOPRAZOLE SODIUM 40 MG: 40 TABLET, DELAYED RELEASE ORAL at 05:46

## 2021-08-01 RX ADMIN — LOPERAMIDE HYDROCHLORIDE 2 MG: 2 CAPSULE ORAL at 20:24

## 2021-08-01 RX ADMIN — AMOXICILLIN AND CLAVULANATE POTASSIUM 1 TABLET: 875; 125 TABLET, FILM COATED ORAL at 20:23

## 2021-08-01 RX ADMIN — LEVETIRACETAM 250 MG: 500 TABLET ORAL at 10:11

## 2021-08-01 RX ADMIN — ONDANSETRON 4 MG: 4 TABLET, ORALLY DISINTEGRATING ORAL at 20:28

## 2021-08-01 RX ADMIN — LOPERAMIDE HYDROCHLORIDE 2 MG: 2 CAPSULE ORAL at 10:12

## 2021-08-01 RX ADMIN — BALSALAZIDE DISODIUM 2250 MG: 750 CAPSULE ORAL at 20:32

## 2021-08-01 RX ADMIN — PREDNISONE 20 MG: 20 TABLET ORAL at 10:12

## 2021-08-01 RX ADMIN — POTASSIUM BICARBONATE 40 MEQ: 782 TABLET, EFFERVESCENT ORAL at 15:41

## 2021-08-01 RX ADMIN — BALSALAZIDE DISODIUM 2250 MG: 750 CAPSULE ORAL at 10:11

## 2021-08-01 RX ADMIN — DONEPEZIL HYDROCHLORIDE 5 MG: 5 TABLET, FILM COATED ORAL at 20:24

## 2021-08-01 RX ADMIN — METOPROLOL SUCCINATE 25 MG: 25 TABLET, FILM COATED, EXTENDED RELEASE ORAL at 10:12

## 2021-08-01 RX ADMIN — ATORVASTATIN CALCIUM 40 MG: 40 TABLET, FILM COATED ORAL at 10:12

## 2021-08-01 RX ADMIN — LISINOPRIL 20 MG: 20 TABLET ORAL at 10:12

## 2021-08-01 RX ADMIN — PANTOPRAZOLE SODIUM 40 MG: 40 TABLET, DELAYED RELEASE ORAL at 15:42

## 2021-08-01 RX ADMIN — BALSALAZIDE DISODIUM 2250 MG: 750 CAPSULE ORAL at 15:41

## 2021-08-01 RX ADMIN — ENOXAPARIN SODIUM 40 MG: 40 INJECTION SUBCUTANEOUS at 10:12

## 2021-08-01 RX ADMIN — POTASSIUM BICARBONATE 40 MEQ: 782 TABLET, EFFERVESCENT ORAL at 20:24

## 2021-08-01 RX ADMIN — LEVETIRACETAM 250 MG: 500 TABLET ORAL at 20:23

## 2021-08-01 ASSESSMENT — PAIN SCALES - PAIN ASSESSMENT IN ADVANCED DEMENTIA (PAINAD)
TOTALSCORE: 0
BREATHING: 0
CONSOLABILITY: 0
BODYLANGUAGE: 0
TOTALSCORE: 0
FACIALEXPRESSION: 0
NEGVOCALIZATION: 0
CONSOLABILITY: 0
NEGVOCALIZATION: 0
BREATHING: 0
BODYLANGUAGE: 0
FACIALEXPRESSION: 0

## 2021-08-01 ASSESSMENT — PAIN SCALES - GENERAL: PAINLEVEL_OUTOF10: 0

## 2021-08-01 NOTE — PROGRESS NOTES
Pt rounded on this morning Q2h, whiteboard updated, pt given ice water and needs assessed. Pt denies pain, has no needs or concerns at this time. Call light within reach, pt verbalized understanding to call prior to ambulating. Will continue to monitor and reassess.    .Electronically signed by Ronda Herrera RN on 8/1/2021 at 4:48 PM

## 2021-08-01 NOTE — PROGRESS NOTES
INPATIENT PROGRESS NOTE        IDENTIFYING DATA/REASON FOR CONSULTATION   PATIENT:  Radha Santos  MRN:  [id number]  ADMIT DATE: 2021  TIME OF EVALUATION: 2021 9:21 AM  HOSPITAL STAY:   LOS: 4 days   CONSULTING PHYSICIAN: Terence Velez MD   REASON FOR CONSULTATION: ulcerative colitis    Subjective:    -Patient had 1 BM recorded the past 24 hours. Patient reports persistent severe diarrhea. MEDICATIONS   SCHEDULED:  amoxicillin-clavulanate, 1 tablet, 2 times per day  predniSONE, 20 mg, BID  atorvastatin, 40 mg, Daily  clopidogrel, 75 mg, Daily  donepezil, 5 mg, Nightly  levETIRAcetam, 250 mg, BID  metoprolol succinate, 25 mg, Daily  lisinopril, 20 mg, Daily  pantoprazole, 40 mg, BID AC  sodium chloride flush, 5-40 mL, 2 times per day  enoxaparin, 40 mg, Daily  balsalazide, 2,250 mg, TID      FLUIDS/DRIPS:     sodium chloride       PRNs: loperamide, 2 mg, 4x Daily PRN  sodium chloride flush, 5-40 mL, PRN  sodium chloride, 25 mL, PRN  ondansetron, 4 mg, Q8H PRN   Or  ondansetron, 4 mg, Q6H PRN  polyethylene glycol, 17 g, Daily PRN  acetaminophen, 650 mg, Q6H PRN   Or  acetaminophen, 650 mg, Q6H PRN  potassium chloride, 40 mEq, PRN   Or  potassium alternative oral replacement, 40 mEq, PRN   Or  potassium chloride, 10 mEq, PRN  magnesium sulfate, 2,000 mg, PRN      ALLERGIES:    Allergies   Allergen Reactions    Cortisone Other (See Comments)     Injection site site sunk in    Percocet [Oxycodone-Acetaminophen] Nausea And Vomiting         PHYSICAL EXAM   VITALS:  BP (!) 158/81   Pulse 61   Temp 98.1 °F (36.7 °C) (Oral)   Resp 12   Ht 4' 10\" (1.473 m)   Wt 159 lb 2.8 oz (72.2 kg)   SpO2 95%   BMI 33.27 kg/m²   TEMPERATURE:  Current - Temp: 98.1 °F (36.7 °C);  Max - Temp  Av.9 °F (36.6 °C)  Min: 97.6 °F (36.4 °C)  Max: 98.1 °F (36.7 °C)    Physical Exam:  General appearance: alert, cooperative, no distress  Eyes: Anicteric  Head: Normocephalic, without obvious abnormality  Lungs: clear to auscultation bilaterally, Normal Effort  Heart: regular rate and rhythm, normal S1 and S2, no murmurs or rubs  Abdomen: soft, non-distended, non-tender. Bowel sounds normal.  Extremities: atraumatic, no cyanosis or edema  Skin: warm and dry, no jaundice  Neuro: Grossly intact, alert, confused    LABS AND IMAGING   Laboratory   No results for input(s): WBC, HGB, HCT, MCV, PLT in the last 72 hours. No results for input(s): NA, K, CL, CO2, PHOS, BUN, CREATININE in the last 72 hours. Invalid input(s): CA  No results for input(s): AST, ALT, ALB, BILIDIR, BILITOT, ALKPHOS in the last 72 hours. No results for input(s): LIPASE, AMYLASE in the last 72 hours. No results for input(s): PROTIME, INR in the last 72 hours. Imaging  CT ABDOMEN PELVIS W IV CONTRAST Additional Contrast? None   Final Result   Diffuse colitis. No evidence of obstruction or perforation. Large hiatal hernia. Endoscopy      ASSESSMENT AND RECOMMENDATIONS   Harleen Hickman is a 78 YO female with a PMH of PMH of Ulcerative colitis, Dementia, CAD on plavix, HTN, HLD, pacemaker who presented on 7/28/2021 with altered mental status and diarrhea. CT showed diffuse colitis. Patient has known UC and follows with Dr. Cantu Service. She has issues with compliance with Balsalazide due to memory issues    1. Colitis, likely UC flare from medication noncompliance    -stool studies neg for cdiff, bacterial infection pending  -started on solumedrol IV 20 mg q8hr   -follows with Dr. Cantu Service for UC, managed on Balsalazide but does not always take it due to memory issues   2. UTI  -on Rocephin  3. Hypokalemia  -Primary team replacing  4. Dementia  -She lives with her son and has history of not taking her medications as prescribed. ? If pt would benefit/qualify for home health services    RECOMMENDATIONS:   -Continue Prednisone 40 mg daily and Balsalazide as ordered. C.diff negative. Patient only had 1 recorded BM overnight.  Patient will need Prednisone tapered as outpatient.   -Discussed with Dr. Arnold Kim yesterday we need to continue Augmentin for UTI with no other options. Augmentin could be exacerbating diarrhea.   -[facility]. Thank you for allowing me to participate in this patient's care. If there are any questions or concerns regarding this patient, or the plan we have set in place, please feel free to contact me at [phone].      Elena Trinh MD

## 2021-08-01 NOTE — PROGRESS NOTES
Discharge order noted, pt awaiting confirmation of acceptance [facility]. SW following for discharge planning. Pt has no other needs or concerns, will continue to monitor and reassess.  Electronically signed by Vivian Barth RN on 8/1/2021 at 5:00 PM

## 2021-08-01 NOTE — DISCHARGE INSTR - COC
Continuity of Care Form    Patient Name: Minh Krishnan   :    MRN:  [id number]    [facility] date:  2021  Discharge date:  21    Code Status Order: Full Code   Advance Directives:      Admitting Physician:  Maggie Cordova MD  PCP: Kenneth Carbajal    Discharging Nurse: Tavia Alfred RN   [address] Unit/Room#: Q4O-7740/5745-34  Discharging Unit Phone Number: [phone]    Emergency Contact:   Extended Emergency Contact Information  Primary Emergency Contact: Cecelia Phone: [phone]  Mobile Phone: [phone]  Relation: Child  Secondary Emergency Contact: Brett Sutton  [place] Phone: [phone]  Relation: Child    Past Surgical History:  Past Surgical History:   Procedure Laterality Date    ANKLE FRACTURE SURGERY  11    ORIF left ankle fracture    COLONOSCOPY N/A 2020    COLONOSCOPY WITH BIOPSY performed by Sofia Irvin MD at [address]      x5   [address] perez    PACEMAKER INSERTION      RHINOPLASTY      UPPER GASTROINTESTINAL ENDOSCOPY N/A 2020    EGD BIOPSY performed by Sofia Irvin MD at [address] [place] [address]       Immunization History:   Immunization History   Administered Date(s) Administered    Influenza Vaccine, unspecified formulation 2007, 2016    PPD Test 2020    Pneumococcal Conjugate 13-valent ([id number]) 2017    Pneumococcal Conjugate Vaccine 2017       Active Problems:  Patient Active Problem List   Diagnosis Code    Ankle fracture S82.899A    CAD (coronary artery disease) I25.10    Pacemaker Z95.0    HTN (hypertension) I10    Hypercholesteremia E78.00    UC (ulcerative colitis) ([facility] [place] [address] K51.90    Intractable headache R51.9    Acute blood loss anemia D62    Atypical syncope R55    Acute encephalopathy G93.40    Acute metabolic encephalopathy V49.38    Suspected COVID-19 virus infection Z20.822    Falls frequently R29.6 Isolation/Infection:   Isolation            No Isolation          Patient Infection Status       Infection Onset Added Last Indicated Last Indicated By Review Planned Expiration Resolved Resolved By    None active    Resolved    C-diff Rule Out 07/29/21 07/29/21 07/30/21 GI Bacterial Pathogens By PCR (Ordered)   07/30/21 Emery Booker RN    C-diff Rule Out 07/28/21 07/28/21 07/29/21 Clostridium Difficile Toxin/Antigen (Ordered)   07/29/21 Rule-Out Test Resulted    COVID-19 Rule Out 04/18/21 04/18/21 04/19/21 COVID-19 (Ordered)   04/19/21 Rule-Out Test Resulted    COVID-19 Rule Out 04/18/21 04/18/21 04/18/21 COVID-19, Rapid (Ordered)   04/18/21 Rule-Out Test Resulted    C-diff Rule Out 04/17/21 04/17/21 04/19/21 Clostridium Difficile Toxin/Antigen (Ordered)   04/19/21 Rule-Out Test Resulted            Nurse Assessment:  Last Vital Signs: BP (!) 158/81   Pulse 61   Temp 98.1 °F (36.7 °C) (Oral)   Resp 12   Ht 4' 10\" (1.473 m)   Wt 159 lb 2.8 oz (72.2 kg)   SpO2 96%   BMI 33.27 kg/m²     Last documented pain score (0-10 scale): Pain Level: 0  Last Weight:   Wt Readings from Last 1 Encounters:   08/01/21 159 lb 2.8 oz (72.2 kg)     Mental Status:  disoriented, alert and forgetful very [facility]     IV Access:  - None    Nursing Mobility/ADLs:  Walking   Assisted  Transfer  Assisted  Bathing  Assisted  Dressing  Assisted  Toileting  Assisted  Feeding  Assisted  Med Admin  Assisted  Med Delivery   whole    Wound Care Documentation and Therapy:  Incision 07/26/11 Ankle Left (Active)   Number of days: 3659        Elimination:  Continence:   · Bowel: No  · Bladder: No  Urinary Catheter: None   Colostomy/Ileostomy/Ileal Conduit: No       Date of Last BM: 8/4/2021 incontinent     Intake/Output Summary (Last 24 hours) at 8/1/2021 1437  Last data filed at 8/1/2021 0836  Gross per 24 hour   Intake 720 ml   Output --   Net 720 ml     I/O last 3 completed shifts:   In: 5 [P.O.:840]  Out: -     Safety Concerns: [place], History of Falls (last 30 days), At Risk for Falls and History of Seizures    Impairments/Disabilities:      Hearing    Nutrition Therapy:  Current Nutrition Therapy:   - Oral Nutrition Supplement:  oral protein and electrolyte     Routes of Feeding: Oral  Liquids: Thin Liquids  Daily Fluid Restriction: no  Last Modified Barium Swallow with Video (Video Swallowing Test): not done    Treatments at the Time of Hospital Discharge:   Respiratory Treatments: no   Oxygen Therapy:  is not on home oxygen therapy. Ventilator:    - No ventilator support    Rehab Therapies: Physical Therapy and Occupational Therapy  Weight Bearing Status/Restrictions: No weight bearing restirctions  Other Medical Equipment (for information only, NOT a DME order):  walker  Other Treatments: none     Patient's personal belongings (please select all that are sent with patient):  Eyeglasses and barrettes     RN SIGNATURE:  Electronically signed by Ana Brannon RN on 8/5/21 at 11:43 AM EDT    CASE MANAGEMENT/SOCIAL WORK SECTION    Inpatient Status Date: 7/28/21    Readmission Risk Assessment Score:  Readmission Risk              Risk of Unplanned Readmission:  22           Discharging to Facility/ Agency   · Name: [facility]  · Address:  · Phone: [phone]  · Fax:    Dialysis Facility (if applicable)   · Name:  · Address:  · Dialysis Schedule:  · Phone:  · Fax:    / signature:Electronically signed by Guillermina Gomez on 8/5/2021 at 11:36 AM     PHYSICIAN SECTION    Prognosis: Fair    Condition at Discharge: Stable    Rehab Potential (if transferring to Rehab): Fair    Recommended Labs or Other Treatments After Discharge: PT, OT, steroid taper per script    Physician Certification: I certify the above information and transfer of Jaime Pablo  is necessary for the continuing treatment of the diagnosis listed and that she requires [facility] for less 30 days.      Update Admission H&P: No change in H&P    PHYSICIAN SIGNATURE:  Electronically signed by Jonathan Hoyt MD on 8/1/21 at 2:37 PM EDT

## 2021-08-01 NOTE — PLAN OF CARE
Problem: Falls - Risk of:  Goal: Will remain free from falls  Description: Will remain free from falls  8/1/2021 7433 by Terri Hadley RN  Outcome: Ongoing   Fall risk assessment completed. Fall precautions in place. Call light within reach. Pt educated on calling for assistance before getting up. Walkway free of clutter. Will continue to monitor. Electronically signed by Terri Hadley RN on 8/1/2021 at 8:13 AM    Problem: Falls - Risk of:  Goal: Absence of physical injury  Description: Absence of physical injury  8/1/2021 0812 by Terri Hadley RN  Outcome: Ongoing   Pt is free of injury. No injury noted. Fall precautions in place. Call light within reach. Will monitor. Electronically signed by Terri Hadley RN on 8/1/2021 at 8:13 AM    Problem: Skin Integrity:  Goal: Will show no infection signs and symptoms  Description: Will show no infection signs and symptoms  8/1/2021 0812 by Terri Hadley RN  Outcome: Ongoing   Pt is free of signs and symptoms of infection. Vital signs stable. Will monitor. Electronically signed by Terri Hadley RN on 8/1/2021 at 8:13 AM    Problem: Skin Integrity:  Goal: Absence of new skin breakdown  Description: Absence of new skin breakdown  8/1/2021 0812 by Terri Hadley RN  Outcome: Ongoing   Skin assessment completed. No skin breakdown noted. Pt reminded to turn and reposition frequently. Will continue to monitor and reassess. Electronically signed by Terri Hadley RN on 8/1/2021 at 8:13 AM    Problem: Nutrition  Goal: Optimal nutrition therapy  8/1/2021 0812 by Terri Hadley RN  Outcome: Ongoing   Nutrition Problem #1: Inadequate oral intake  Intervention: Food and/or Nutrient Delivery: Continue Current Diet, Start Oral Nutrition Supplement  Nutritional Goals:  Tolerate diet and consume greater than 50% of meals and supplements  Electronically signed by Terri Hadley RN on 8/1/2021 at 8:13 AM

## 2021-08-01 NOTE — PLAN OF CARE
Problem: Falls - Risk of:  Goal: Will remain free from falls  Description: Will remain free from falls  7/31/2021 2329 by Narciso Coyne RN  Outcome: Ongoing  7/31/2021 1715 by Chris Little RN  Outcome: Ongoing  Goal: Absence of physical injury  Description: Absence of physical injury  7/31/2021 2329 by Narciso Coyne RN  Outcome: Ongoing  7/31/2021 1715 by Chris Little RN  Outcome: Ongoing     Problem: Skin Integrity:  Goal: Will show no infection signs and symptoms  Description: Will show no infection signs and symptoms  7/31/2021 2329 by Narciso Coyne RN  Outcome: Ongoing  7/31/2021 1715 by Chris Little RN  Outcome: Ongoing  Goal: Absence of new skin breakdown  Description: Absence of new skin breakdown  7/31/2021 2329 by Narciso Coyne RN  Outcome: Ongoing  7/31/2021 1715 by Chris Little RN  Outcome: Ongoing     Problem: Nutrition  Goal: Optimal nutrition therapy  7/31/2021 2329 by Narciso Coyne RN  Outcome: Ongoing  7/31/2021 1715 by Chris Little RN  Outcome: Ongoing

## 2021-08-01 NOTE — PROGRESS NOTES
Hospitalist Progress Note      PCP: Maryann Landon    Date of Admission: 7/28/2021    Chief Complaint: [address] Course: The patient is a 77 y.o. female who presents to [facility] with altered mental status. Patient has a history of dementia, ulcerative colitis, hypertension, hyperlipidemia who presents to the emergency department with altered mental status for a few days. Patient lives with her son after recent admission to the hospital followed by a short stay at a SNF. Patient at that point had diarrhea, JUNI, urinary tract infection along with acute metabolic encephalopathy. Patient arrived to the emergency department covered in feces along with her son. Patient's labs on admission showed a potassium of 2.5, magnesium of 1.5 and a white count of 13. Urinalysis was concerning for urinary tract infection and a CT of her abdomen pelvis showed pancolitis. C. difficile along with GI pathogens ordered. Patient started on IV fluids along with broad-spectrum IV antibiotics. Patient also started on prednisone low-dose for possible ulcerative colitis flareup. GI consulted and she will be admitted to the hospital for further care and intervention    7/29 Patient much more awake and alert today than she was yesterday. Ulcerative colitis flare thought to be secondary to noncompliance. 7/30 No acute issues, continues to have diarrhea. Consider Imodium if GI pathogen panel returned negative    7/31 Still having diarrhea. Started on Imodium as C. difficile and GI pathogens are both negative. Unfortunately we cannot change the patient's oral Augmentin as her cultures grow Enterococcus sensitive to only vancomycin and ampicillin. Patient lost IV access days ago and was a hard stick. I do not think it is worth placing a PICC line to continue to get IV antibiotics.     Subjective: Patient seen and examined. Patient's diarrhea started to slow down.   Can likely discontinue antibiotics after 5 days of total treatment. Stable for discharge to a SNF. Awaiting precertification from insurance, new. Medications:  Reviewed    Infusion Medications    sodium chloride       Scheduled Medications    amoxicillin-clavulanate  1 tablet Oral 2 times per day    predniSONE  20 mg Oral BID    atorvastatin  40 mg Oral Daily    clopidogrel  75 mg Oral Daily    donepezil  5 mg Oral Nightly    levETIRAcetam  250 mg Oral BID    metoprolol succinate  25 mg Oral Daily    lisinopril  20 mg Oral Daily    pantoprazole  40 mg Oral BID AC    sodium chloride flush  5-40 mL Intravenous 2 times per day    enoxaparin  40 mg Subcutaneous Daily    balsalazide  2,250 mg Oral TID     PRN Meds: loperamide, sodium chloride flush, sodium chloride, ondansetron **OR** ondansetron, polyethylene glycol, acetaminophen **OR** acetaminophen, potassium chloride **OR** potassium alternative oral replacement **OR** potassium chloride, magnesium sulfate      Intake/Output Summary (Last 24 hours) at 8/1/2021 0951  Last data filed at 8/1/2021 0836  Gross per 24 hour   Intake 1080 ml   Output --   Net 1080 ml       Physical Exam Performed:    BP (!) 158/81   Pulse 61   Temp 98.1 °F (36.7 °C) (Oral)   Resp 12   Ht 4' 10\" (1.473 m)   Wt 159 lb 2.8 oz (72.2 kg)   SpO2 95%   BMI 33.27 kg/m²     General appearance: No apparent distress, appears stated age and cooperative. HEENT: Pupils equal, round, and reactive to light. Conjunctivae/corneas clear. Neck: Supple, with full range of motion. No jugular venous distention. Trachea midline. Respiratory:  Normal respiratory effort. Clear to auscultation  Cardiovascular: Regular rate and rhythm with normal S1/S2   Abdomen: Soft, non-tender, non-distended with normal bowel sounds. Musculoskeletal: No clubbing, cyanosis or edema bilaterally. Skin: Skin color, texture, turgor normal.  No rashes or lesions. Neurologic:  Neurovascularly intact without any focal sensory/motor deficits.  Cranial nerves: II-XII intact, grossly non-focal.  Psychiatric: Alert and confused  Capillary Refill: Brisk,< 3 seconds   Peripheral Pulses: +2 palpable, equal bilaterally       Labs:   No results for input(s): WBC, HGB, HCT, PLT in the last 72 hours. No results for input(s): NA, K, CL, CO2, BUN, CREATININE, CALCIUM, PHOS in the last 72 hours. Invalid input(s): MAGNES  No results for input(s): AST, ALT, BILIDIR, BILITOT, ALKPHOS in the last 72 hours. No results for input(s): INR in the last 72 hours. No results for input(s): Jadene Moh in the last 72 hours. Urinalysis:      Lab Results   Component Value Date    NITRU Negative 07/28/2021    WBCUA 11 07/28/2021    BACTERIA 4+ 07/28/2021    RBCUA 2 07/28/2021    BLOODU Negative 07/28/2021    SPECGRAV 1.018 07/28/2021    GLUCOSEU Negative 07/28/2021    GLUCOSEU NEGATIVE 11/02/2010       Radiology:  CT ABDOMEN PELVIS W IV CONTRAST Additional Contrast? None   Final Result   Diffuse colitis. No evidence of obstruction or perforation. Large hiatal hernia. Assessment/Plan:    Diarrhea  C. difficile neg, GI pathogens negative  Imodium started, diarrhea slowing down  CT with diffuse colitis  Possible ulcerative colitis flare  Solumedrol 20mg q8, changed to prednisone 20 mg every 8 due to no IV access  Prednisone taper at discharge     Urinary tract infection  Await culture and sensitivity: enteroccus faecium  Continue Rocephin, transition to oral Augmentin  Finish 5 days of total therapy     Acute metabolic encephalopathy  Likely secondary to above  Treat underlying conditions  resolving     Hypomagnesemia  Replete and recheck  resolved     Hypokalemia  Replete and recheck    DVT Prophylaxis: lovenox  Diet: ADULT DIET;  Regular  Adult Oral Nutrition Supplement; Clear Liquid Oral Supplement  Code Status: Full Code    PT/OT Eval Status: 3-5 sessions per week    Dispo - Inpt, avoidable days as we await pre-CERT    Marybeth King MD

## 2021-08-02 LAB
ALBUMIN SERPL-MCNC: 3.3 G/DL (ref 3.4–5)
ANION GAP SERPL CALCULATED.3IONS-SCNC: 13 MMOL/L (ref 3–16)
BASOPHILS ABSOLUTE: 0 K/UL (ref 0–0.2)
BASOPHILS RELATIVE PERCENT: 0.2 %
BUN BLDV-MCNC: 21 MG/DL (ref 7–20)
CALCIUM SERPL-MCNC: 8.3 MG/DL (ref 8.3–10.6)
CHLORIDE BLD-SCNC: 107 MMOL/L (ref 99–110)
CO2: 25 MMOL/L (ref 21–32)
CREAT SERPL-MCNC: 0.8 MG/DL (ref 0.6–1.2)
EOSINOPHILS ABSOLUTE: 0 K/UL (ref 0–0.6)
EOSINOPHILS RELATIVE PERCENT: 0 %
GFR AFRICAN AMERICAN: >60
GFR NON-AFRICAN AMERICAN: >60
GLUCOSE BLD-MCNC: 142 MG/DL (ref 70–99)
HCT VFR BLD CALC: 30.7 % (ref 36–48)
HEMATOLOGY PATH CONSULT: NO
HEMOGLOBIN: 9.7 G/DL (ref 12–16)
LYMPHOCYTES ABSOLUTE: 2.5 K/UL (ref 1–5.1)
LYMPHOCYTES RELATIVE PERCENT: 15.5 %
MCH RBC QN AUTO: 19.9 PG (ref 26–34)
MCHC RBC AUTO-ENTMCNC: 31.6 G/DL (ref 31–36)
MCV RBC AUTO: 63.1 FL (ref 80–100)
MONOCYTES ABSOLUTE: 0.9 K/UL (ref 0–1.3)
MONOCYTES RELATIVE PERCENT: 5.5 %
NEUTROPHILS ABSOLUTE: 12.7 K/UL (ref 1.7–7.7)
NEUTROPHILS RELATIVE PERCENT: 78.8 %
PDW BLD-RTO: 19.3 % (ref 12.4–15.4)
PHOSPHORUS: 3.2 MG/DL (ref 2.5–4.9)
PLATELET # BLD: 418 K/UL (ref 135–450)
PMV BLD AUTO: 7.1 FL (ref 5–10.5)
POTASSIUM SERPL-SCNC: 3.3 MMOL/L (ref 3.5–5.1)
RBC # BLD: 4.87 M/UL (ref 4–5.2)
SODIUM BLD-SCNC: 145 MMOL/L (ref 136–145)
WBC # BLD: 16.2 K/UL (ref 4–11)

## 2021-08-02 PROCEDURE — 6360000002 HC RX W HCPCS: Performed by: INTERNAL MEDICINE

## 2021-08-02 PROCEDURE — 94761 N-INVAS EAR/PLS OXIMETRY MLT: CPT

## 2021-08-02 PROCEDURE — 1200000000 HC SEMI PRIVATE

## 2021-08-02 PROCEDURE — 6370000000 HC RX 637 (ALT 250 FOR IP): Performed by: INTERNAL MEDICINE

## 2021-08-02 PROCEDURE — 80069 RENAL FUNCTION PANEL: CPT

## 2021-08-02 PROCEDURE — 36415 COLL VENOUS BLD VENIPUNCTURE: CPT

## 2021-08-02 RX ORDER — AMOXICILLIN AND CLAVULANATE POTASSIUM 875; 125 MG/1; MG/1
1 TABLET, FILM COATED ORAL EVERY 12 HOURS SCHEDULED
Qty: 4 TABLET | Refills: 0 | DISCHARGE
Start: 2021-08-02 | End: 2021-08-03 | Stop reason: HOSPADM

## 2021-08-02 RX ADMIN — LEVETIRACETAM 250 MG: 500 TABLET ORAL at 08:50

## 2021-08-02 RX ADMIN — AMOXICILLIN AND CLAVULANATE POTASSIUM 1 TABLET: 875; 125 TABLET, FILM COATED ORAL at 20:08

## 2021-08-02 RX ADMIN — PREDNISONE 20 MG: 20 TABLET ORAL at 08:49

## 2021-08-02 RX ADMIN — POTASSIUM BICARBONATE 40 MEQ: 782 TABLET, EFFERVESCENT ORAL at 20:09

## 2021-08-02 RX ADMIN — ENOXAPARIN SODIUM 40 MG: 40 INJECTION SUBCUTANEOUS at 08:51

## 2021-08-02 RX ADMIN — DONEPEZIL HYDROCHLORIDE 5 MG: 5 TABLET, FILM COATED ORAL at 20:09

## 2021-08-02 RX ADMIN — LOPERAMIDE HYDROCHLORIDE 2 MG: 2 CAPSULE ORAL at 20:10

## 2021-08-02 RX ADMIN — LOPERAMIDE HYDROCHLORIDE 2 MG: 2 CAPSULE ORAL at 03:16

## 2021-08-02 RX ADMIN — ATORVASTATIN CALCIUM 40 MG: 40 TABLET, FILM COATED ORAL at 08:49

## 2021-08-02 RX ADMIN — PREDNISONE 20 MG: 20 TABLET ORAL at 20:08

## 2021-08-02 RX ADMIN — AMOXICILLIN AND CLAVULANATE POTASSIUM 1 TABLET: 875; 125 TABLET, FILM COATED ORAL at 08:49

## 2021-08-02 RX ADMIN — LEVETIRACETAM 250 MG: 500 TABLET ORAL at 20:09

## 2021-08-02 RX ADMIN — BALSALAZIDE DISODIUM 2250 MG: 750 CAPSULE ORAL at 08:49

## 2021-08-02 RX ADMIN — METOPROLOL SUCCINATE 25 MG: 25 TABLET, FILM COATED, EXTENDED RELEASE ORAL at 08:50

## 2021-08-02 RX ADMIN — BALSALAZIDE DISODIUM 2250 MG: 750 CAPSULE ORAL at 20:09

## 2021-08-02 RX ADMIN — CLOPIDOGREL BISULFATE 75 MG: 75 TABLET ORAL at 08:50

## 2021-08-02 RX ADMIN — POTASSIUM BICARBONATE 40 MEQ: 782 TABLET, EFFERVESCENT ORAL at 08:50

## 2021-08-02 RX ADMIN — LISINOPRIL 20 MG: 20 TABLET ORAL at 08:49

## 2021-08-02 RX ADMIN — PANTOPRAZOLE SODIUM 40 MG: 40 TABLET, DELAYED RELEASE ORAL at 05:07

## 2021-08-02 RX ADMIN — LOPERAMIDE HYDROCHLORIDE 2 MG: 2 CAPSULE ORAL at 22:11

## 2021-08-02 RX ADMIN — BALSALAZIDE DISODIUM 2250 MG: 750 CAPSULE ORAL at 16:30

## 2021-08-02 ASSESSMENT — PAIN SCALES - GENERAL
PAINLEVEL_OUTOF10: 0
PAINLEVEL_OUTOF10: 0

## 2021-08-02 NOTE — PROGRESS NOTES
Patient in bed, eating breakfast. She is alert and oriented x4. She is very hard of hearing. She denies pain or needs at this time. Call light and personal items in reach, bed alarm set, will monitor .

## 2021-08-02 NOTE — PROGRESS NOTES
Hospitalist Progress Note      PCP: Alma Hodge    Date of Admission: 7/28/2021    Chief Complaint: [address] Course: The patient is a 68 y. o. female who presents to [facility] with altered mental status.  Patient has a history of dementia, ulcerative colitis, hypertension, hyperlipidemia who presents to the emergency department with altered mental status for a few days.  Patient lives with her son after recent admission to the hospital followed by a short stay at a SNF.  Patient at that point had diarrhea, JUNI, urinary tract infection along with acute metabolic encephalopathy.  Patient arrived to the emergency department covered in feces along with her son.  Patient's labs on admission showed a potassium of 2.5, magnesium of 1.5 and a white count of 13.  Urinalysis was concerning for urinary tract infection and a CT of her abdomen pelvis showed pancolitis.  C. difficile along with GI pathogens ordered.  Patient started on IV fluids along with broad-spectrum IV antibiotics.  Patient also started on prednisone low-dose for possible ulcerative colitis flareup. [facility] consulted and she will be admitted to the hospital for further care and intervention. C. difficile and GI pathogen panels were both negative so Imodium was started and her diarrhea started to slow down. Patient was started on steroids for her Crohn's flare and will discharge on a steroid taper. Her urinary tract infection was treated with Rocephin at first before transitioning to Augmentin. She lost IV access after she pulled out an IV and because it is Enterococcus faecium we are only able to use ampicillin or vancomycin. Patient will discharge on a few more days of Augmentin.     Subjective: Patient seen and examined. Patient had 16 episodes of diarrhea yesterday. Augmentin tomorrow and this will hopefully slow down her diarrhea.   Electrolytes still being replaced, unable to discharge with ongoing diarrhea    Medications: Reviewed    Infusion Medications    sodium chloride       Scheduled Medications    potassium bicarb-citric acid  40 mEq Oral BID    amoxicillin-clavulanate  1 tablet Oral 2 times per day    predniSONE  20 mg Oral BID    atorvastatin  40 mg Oral Daily    clopidogrel  75 mg Oral Daily    donepezil  5 mg Oral Nightly    levETIRAcetam  250 mg Oral BID    metoprolol succinate  25 mg Oral Daily    lisinopril  20 mg Oral Daily    pantoprazole  40 mg Oral BID AC    sodium chloride flush  5-40 mL Intravenous 2 times per day    enoxaparin  40 mg Subcutaneous Daily    balsalazide  2,250 mg Oral TID     PRN Meds: loperamide, sodium chloride flush, sodium chloride, ondansetron **OR** ondansetron, polyethylene glycol, acetaminophen **OR** acetaminophen, magnesium sulfate      Intake/Output Summary (Last 24 hours) at 8/2/2021 1426  Last data filed at 8/2/2021 0901  Gross per 24 hour   Intake 1200 ml   Output --   Net 1200 ml       Physical Exam Performed:    BP (!) 154/78   Pulse 61   Temp 98.3 °F (36.8 °C) (Oral)   Resp 18   Ht 4' 10\" (1.473 m)   Wt 161 lb 13.1 oz (73.4 kg)   SpO2 95%   BMI 33.82 kg/m²     General appearance: No apparent distress, appears stated age and cooperative. HEENT: Pupils equal, round, and reactive to light. Conjunctivae/corneas clear. Neck: Supple, with full range of motion. No jugular venous distention. Trachea midline. Respiratory:  Normal respiratory effort. Clear to auscultation  Cardiovascular: Regular rate and rhythm with normal S1/S2   Abdomen: Soft, non-tender, non-distended with normal bowel sounds. Musculoskeletal: No clubbing, cyanosis or edema bilaterally. Skin: Skin color, texture, turgor normal.  No rashes or lesions. Neurologic:  Neurovascularly intact without any focal sensory/motor deficits.  Cranial nerves: II-XII intact, grossly non-focal.  Psychiatric: Alert and confused  Capillary Refill: Brisk,< 3 seconds   Peripheral Pulses: +2 palpable, equal bilaterally Labs:   Recent Labs     08/01/21  1011   WBC 16.2*   HGB 9.7*   HCT 30.7*        Recent Labs     08/01/21  1011 08/02/21  0904    145   K 2.8* 3.3*    107   CO2 23 25   BUN 19 21*   CREATININE 0.7 0.8   CALCIUM 8.1* 8.3   PHOS 2.7 3.2     No results for input(s): AST, ALT, BILIDIR, BILITOT, ALKPHOS in the last 72 hours. No results for input(s): INR in the last 72 hours. No results for input(s): Merna Zafar in the last 72 hours. Urinalysis:      Lab Results   Component Value Date    NITRU Negative 07/28/2021    WBCUA 11 07/28/2021    BACTERIA 4+ 07/28/2021    RBCUA 2 07/28/2021    BLOODU Negative 07/28/2021    SPECGRAV 1.018 07/28/2021    GLUCOSEU Negative 07/28/2021    GLUCOSEU NEGATIVE 11/02/2010       Radiology:  CT ABDOMEN PELVIS W IV CONTRAST Additional Contrast? None   Final Result   Diffuse colitis. No evidence of obstruction or perforation. Large hiatal hernia. Assessment/Plan:    Diarrhea  C. difficile neg, GI pathogens negative  Imodium started, diarrhea slowing down  CT with diffuse colitis  Possible ulcerative colitis flare  Solumedrol 20mg q8, changed to prednisone 20 mg every 8 due to no IV access, steroid taper at discharge 40mg daily for 28 days then decrease to 30mg daily for 5 days, 20mg daily for 5 days, 15mg daily for 5 days, 10mg daily for 5 days, and 5mg daily for 5 days, then stop    Urinary tract infection  Await culture and sensitivity: enteroccus faecium  Continue Rocephin, transition to oral Augmentin  Finish 5 days of total therapy     Acute metabolic encephalopathy  Likely secondary to above  Treat underlying conditions  resolving     Hypomagnesemia  Replete and recheck  resolved     Hypokalemia  Replete and recheck    DVT Prophylaxis: lovenox  Diet: ADULT DIET;  Regular  Adult Oral Nutrition Supplement; Clear Liquid Oral Supplement  Code Status: Full Code    PT/OT Eval Status: 3-5 sessions per week    Dispo - [address], MD

## 2021-08-02 NOTE — PROGRESS NOTES
Gastroenterology Progress Note    Ezekiel Sacks is a 77 y.o. female patient. Active Problems:    Acute metabolic encephalopathy  Resolved Problems:    * No resolved hospital problems. *      SUBJECTIVE:  She has 16 bowel movements documented yesterday but only 1 BM overnight. No blood in the stool. Has mild abdominal discomfort. No fevers.     Current Facility-Administered Medications: potassium bicarb-citric acid (EFFER-K) effervescent tablet 40 mEq, 40 mEq, Oral, BID  loperamide (IMODIUM) capsule 2 mg, 2 mg, Oral, 4x Daily PRN  amoxicillin-clavulanate (AUGMENTIN) 875-125 MG per tablet 1 tablet, 1 tablet, Oral, 2 times per day  predniSONE (DELTASONE) tablet 20 mg, 20 mg, Oral, BID  atorvastatin (LIPITOR) tablet 40 mg, 40 mg, Oral, Daily  clopidogrel (PLAVIX) tablet 75 mg, 75 mg, Oral, Daily  donepezil (ARICEPT) tablet 5 mg, 5 mg, Oral, Nightly  levETIRAcetam (KEPPRA) tablet 250 mg, 250 mg, Oral, BID  metoprolol succinate (TOPROL XL) extended release tablet 25 mg, 25 mg, Oral, Daily  lisinopril (PRINIVIL;ZESTRIL) tablet 20 mg, 20 mg, Oral, Daily  pantoprazole (PROTONIX) tablet 40 mg, 40 mg, Oral, BID AC  sodium chloride flush 0.9 % injection 5-40 mL, 5-40 mL, Intravenous, 2 times per day  sodium chloride flush 0.9 % injection 5-40 mL, 5-40 mL, Intravenous, PRN  0.9 % sodium chloride infusion, 25 mL, Intravenous, PRN  enoxaparin (LOVENOX) injection 40 mg, 40 mg, Subcutaneous, Daily  ondansetron (ZOFRAN-ODT) disintegrating tablet 4 mg, 4 mg, Oral, Q8H PRN **OR** ondansetron (ZOFRAN) injection 4 mg, 4 mg, Intravenous, Q6H PRN  polyethylene glycol (GLYCOLAX) packet 17 g, 17 g, Oral, Daily PRN  acetaminophen (TYLENOL) tablet 650 mg, 650 mg, Oral, Q6H PRN **OR** acetaminophen (TYLENOL) suppository 650 mg, 650 mg, Rectal, Q6H PRN  magnesium sulfate 2000 mg in 50 mL IVPB premix, 2,000 mg, Intravenous, PRN  balsalazide (COLAZAL) capsule 2,250 mg, 2,250 mg, Oral, TID    Physical    VITALS:  BP (!) 154/78   Pulse 61 Temp 98.3 °F (36.8 °C) (Oral)   Resp 18   Ht 4' 10\" (1.473 m)   Wt 161 lb 13.1 oz (73.4 kg)   SpO2 95%   BMI 33.82 kg/m²   TEMPERATURE:  Current - Temp: 98.3 °F (36.8 °C); Max - Temp  Av.2 °F (36.8 °C)  Min: 98 °F (36.7 °C)  Max: 98.3 °F (36.8 °C)    NAD  Hard of hearing  Eyes: No icterus  RRR  Lungs CTA Bilaterally, normal effort  Abdomen soft, ND, NT, Bowel sounds normal.  Ext: no edema  Neuro: No tremor  Psych: A&Ox3    Data    Data Review:    Recent Labs     21  1011   WBC 16.2*   HGB 9.7*   HCT 30.7*   MCV 63.1*        Recent Labs     21  1011      K 2.8*      CO2 23   PHOS 2.7   BUN 19   CREATININE 0.7     No results for input(s): AST, ALT, ALB, BILIDIR, BILITOT, ALKPHOS in the last 72 hours. No results for input(s): LIPASE, AMYLASE in the last 72 hours. No results for input(s): PROTIME, INR in the last 72 hours. No results for input(s): PTT in the last 72 hours. ASSESSMENT:  77 y.o. female with a PMH of ulcerative colitis, dementia, CAD on plavix, HTN, HLD, pacemaker who presented on 2021 with altered mental status and diarrhea. CT showed diffuse colitis. UA was concerning for UTI. Blood work noted hypokalemia, mild leukocytosis. She follows with Dr. Clemencia Mattson for her UC. She is managed on Balsalazide however has a history of not taking her medications. EGD/colonoscopy 2020 with Dr. Fanny Park showed a 5cm hiatal hernia. Erosive gastritis of the antrum. Biopsies negative for H. Pylori. Normal duodenum.  Biopsies negative for celiac disease. Moderate colitis with erythema, edema, and ulcerations from the rectum to the hepatic flexure with biopsies showing severe active chronic colitis. Normal colonic mucosa in the right colon and cecum.       1.  Ulcerative pancolitis with flare suspected secondary to medication non-compliance. C. Diff and stool culture negative. 2.  Dementia: Lives at home with son. Plan is for placement  3.   UTI on augmentin with plan for 5 days. PLAN :  1.  Change prednisone to 40mg daily on discharge  2. Continue balsalazide  3. On discharge would stay on 40mg daily for 28 days then decrease to 30mg daily for 5 days, 20mg daily for 5 days, 15mg daily for 5 days, 10mg daily for 5 days, and 5mg daily for 5 days, then stop. Will then try to maintain on balsalazide. With placement, medication compliance will be easier to monitor. 4.  Outpatient follow-up with Dr. Ladon Epley in 2-3 weeks. I notified him of admission and put in discharge instructions the follow-up. 5.  Monitor stool output. Needs to do better than 16  BM's per day prior to discharge. Thank you for allowing me to participate in the care of your patient. Please feel free to contact me with any concerns.   [address], MD

## 2021-08-02 NOTE — CARE COORDINATION
8/2 received call from Niranjan Mckeon with [facility] (#[phone]) they are unable to accept patient --states their Medicaid pending beds are capped and their secured unit is full. States she will pass referral on to their sister facilities to check to see if they can accept. Referral made to [facility]   Will follow.   Electronically signed by Balaji Joseph on 8/2/2021 at 10:20 AM  #[phone]

## 2021-08-02 NOTE — DISCHARGE SUMMARY
Hospital Medicine Discharge Summary    Patient ID: Ольга Perales      Patient's PCP: Jose Canada Date: 7/28/2021     Discharge Date:   8/3/21    Admitting Physician: Khushi Herrera MD     Discharge Physician: Khushi Herrera MD     Discharge Diagnoses: Active Hospital Problems    Diagnosis Date Noted    Acute metabolic encephalopathy [R67.99] 11/16/2020       The patient was seen and examined on day of discharge and this discharge summary is in conjunction with any daily progress note from day of discharge. Hospital Course: The patient is a 68 y. o. female who presents to [facility] with altered mental status.  Patient has a history of dementia, ulcerative colitis, hypertension, hyperlipidemia who presents to the emergency department with altered mental status for a few days.  Patient lives with her son after recent admission to the hospital followed by a short stay at a SNF.  Patient at that point had diarrhea, JUNI, urinary tract infection along with acute metabolic encephalopathy.  Patient arrived to the emergency department covered in feces along with her son.  Patient's labs on admission showed a potassium of 2.5, magnesium of 1.5 and a white count of 13.  Urinalysis was concerning for urinary tract infection and a CT of her abdomen pelvis showed pancolitis.  C. difficile along with GI pathogens ordered.  Patient started on IV fluids along with broad-spectrum IV antibiotics.  Patient also started on prednisone low-dose for possible ulcerative colitis flareup. [address] consulted and she will be admitted to the hospital for further care and intervention. C. difficile and GI pathogen panels were both negative so Imodium was started and her diarrhea started to slow down. Patient was started on steroids for her Crohn's flare and will discharge on a steroid taper. Her urinary tract infection was treated with Rocephin at first before transitioning to Augmentin.   She lost IV access after she pulled out an IV and because it is Enterococcus faecium we are only able to use ampicillin or vancomycin. Patient will discharge on a few more days of Augmentin. Patient is stable for discharge to a SNF and has been since August 1 but we are awaiting precertification from insurance. They are avoidable days. Diarrhea  C. difficile neg, GI pathogens negative  Imodium started, diarrhea slowing down  CT with diffuse colitis  Possible ulcerative colitis flare  Solumedrol 20mg q8, changed to prednisone 20 mg every 8 due to no IV access, steroid taper at discharge 40mg daily for 28 days then decrease to 30mg daily for 5 days, 20mg daily for 5 days, 15mg daily for 5 days, 10mg daily for 5 days, and 5mg daily for 5 days, then stop     Urinary tract infection  Await culture and sensitivity: enteroccus faecium  Continue Rocephin, transition to oral Augmentin  Finished 5 days of total therapy     Acute metabolic encephalopathy  Likely secondary to above  Treat underlying conditions  resolving     Hypomagnesemia  Replete and recheck  resolved     Hypokalemia  Replete and recheck    Exam:     BP (!) 130/91   Pulse 64   Temp 98.7 °F (37.1 °C) (Oral)   Resp 14   Ht 4' 10\" (1.473 m)   Wt 162 lb 11.2 oz (73.8 kg)   SpO2 98%   BMI 34.00 kg/m²     General appearance: No apparent distress, appears stated age and cooperative. HEENT: Pupils equal, round, and reactive to light. Conjunctivae/corneas clear, [place]  Neck: Supple, with full range of motion. No jugular venous distention. Trachea midline. Respiratory:  Normal respiratory effort. Clear to auscultation, bilaterally without Rales/Wheezes/Rhonchi. Cardiovascular: Regular rate and rhythm with normal S1/S2 without murmurs, rubs or gallops. Abdomen: Soft, non-tender, non-distended with normal bowel sounds. Musculoskeletal: No clubbing, cyanosis or edema bilaterally. Full range of motion without deformity.   Skin: Skin color, texture, turgor normal.  No rashes or lesions. Neurologic:  Neurovascularly intact without any focal sensory/motor deficits. Cranial nerves: II-XII intact, grossly non-focal.  Psychiatric: Alert and oriented, thought content appropriate, normal insight      Consults:     IP CONSULT TO SOCIAL WORK  IP CONSULT TO HOSPITALIST  IP CONSULT TO GI  IP CONSULT TO DIETITIAN    Significant Diagnostic Studies:     CT ABDOMEN PELVIS W IV CONTRAST Additional Contrast? None   Final Result   Diffuse colitis. No evidence of obstruction or perforation. Large hiatal hernia. Disposition:  SNF     Condition at Discharge: Stable    Discharge Instructions/Follow-up:  PCP    Code Status:  Full Code     Activity: activity as tolerated    Diet: regular diet      Labs: For convenience and continuity at follow-up the following most recent labs are provided:      CBC:    Lab Results   Component Value Date    WBC 16.2 08/01/2021    HGB 9.7 08/01/2021    HCT 30.7 08/01/2021     08/01/2021       Renal:    Lab Results   Component Value Date     08/03/2021    K 4.1 08/03/2021    K 3.4 07/29/2021     08/03/2021    CO2 27 08/03/2021    BUN 20 08/03/2021    CREATININE 0.6 08/03/2021    CALCIUM 8.2 08/03/2021    PHOS 3.0 08/03/2021       Discharge Medications:     Current Discharge Medication List           Details   predniSONE (DELTASONE) 10 MG tablet Take 4 tablets by mouth daily for 28 days, THEN 3 tablets daily for 5 days, THEN 2 tablets daily for 5 days, THEN 1.5 tablets daily for 5 days, THEN 1 tablet daily for 5 days, THEN 0.5 tablets daily for 5 days.   Qty: 152 tablet, Refills: 0      loperamide (IMODIUM) 2 MG capsule Take 1 capsule by mouth 4 times daily as needed for Diarrhea  Qty: 40 capsule, Refills: 0              Details   balsalazide (COLAZAL) 750 MG capsule Take 2,250 mg by mouth 3 times daily      donepezil (ARICEPT) 5 MG tablet Take 1 tablet by mouth nightly  Qty: 30 tablet, Refills: 3      vitamin B-12 1000 MCG tablet Take 1 tablet by mouth daily  Qty: 30 tablet, Refills: 0      levETIRAcetam (KEPPRA) 250 MG tablet Take 1 tablet by mouth 2 times daily  Qty: 60 tablet, Refills: 3      pantoprazole (PROTONIX) 40 MG tablet Take 1 tablet by mouth 2 times daily (before meals)  Qty: 180 tablet, Refills: 1      lisinopril (PRINIVIL;ZESTRIL) 20 MG tablet Take 20 mg by mouth daily. clopidogrel (PLAVIX) 75 MG tablet Take 75 mg by mouth daily. metoprolol (TOPROL-XL) 25 MG XL tablet Take 25 mg by mouth daily. atorvastatin (LIPITOR) 40 MG tablet Take 40 mg by mouth daily. No future appointments. Time Spent on discharge is more than 30 minutes in the examination, evaluation, counseling and review of medications and discharge plan. Signed:    Terence Velez MD   8/3/2021      Thank you Reyes Celis for the opportunity to be involved in this patient's care. If you have any questions or concerns please feel free to contact me at [phone].

## 2021-08-02 NOTE — PLAN OF CARE
Problem: Falls - Risk of:  Goal: Will remain free from falls  Description: Will remain free from falls  8/2/2021 1036 by Simón Mcmanus RN  Outcome: Met This Shift  Note: No falls noted this shift. Patient ambulates with one staff assist and a walker. Bed kept in low position. Safe environment maintained. Bedside table & call light in reach. Uses call light appropriately when needing assistance. 8/1/2021 2244 by Preeti Mills RN  Outcome: Met This Shift  Note: Patient educated on fall prevention. Call light is within reach, bed locked in lowest position, personal items within reach, and bed alarm is on. Will round on patient per unit guidelines. Goal: Absence of physical injury  Description: Absence of physical injury  8/2/2021 1036 by Simón Mcmanus RN  Outcome: Met This Shift  Note: Patient has been assessed for fall risk, fall precautions are in place, environment has been cleared of obstacles and reassessed during rounding, bed is in lowest position with the wheels locked, call light is within reach. ID band has been verified, appropriate transfer methods have been utilized and patient has been educated on safety, bed/chair alarms utilized   8/1/2021 2244 by Preeti Mills RN  Outcome: Met This Shift  Note: Pt is free of injury. No injury noted. Fall precautions in place. Call light within reach. Will monitor. Problem: Nutrition  Goal: Optimal nutrition therapy  8/2/2021 1036 by Simón Mcmanus RN  Outcome: Met This Shift  Note: She is eating 100% of her meals but is not drinking her supplement  8/1/2021 2244 by Preeti Mills RN  Outcome: Ongoing  Note: Patient educated on proper nutrition. Patients intake monitored and patient assessed to make sure no assistance with eating was required. Patient encouraged to eat a well balanced diet.        Problem: Pain:  Goal: Pain level will decrease  Description: Pain level will decrease  8/2/2021 1036 by Simón Mcmanus RN  Outcome: Met This Shift  Note: Patient denies pain this shift. 8/1/2021 2244 by Lubna Loya RN  Outcome: Met This Shift  Note: Pt malena pain at this time. Goal: Control of acute pain  Description: Control of acute pain  8/2/2021 1036 by Malina Kang RN  Outcome: Met This Shift  8/1/2021 2244 by Lubna Loya RN  Outcome: Met This Shift  Note: Pt malena pain at this time. Goal: Control of chronic pain  Description: Control of chronic pain  8/2/2021 1036 by Malina Kang RN  Outcome: Met This Shift  8/1/2021 2244 by Lubna Loya RN  Outcome: Met This Shift  Note: Pt malena pain at this time.

## 2021-08-02 NOTE — CARE COORDINATION
Una at [address] [place] [address] is working with son to see if he is able to obtain the documentation to complete medicaid application.      Electronically signed by ROSIO Walters, YANELISW, Case Management on 8/2/2021 at 9:24 AM  [facility] [id number]

## 2021-08-02 NOTE — PROGRESS NOTES
PT AAO x4 per this shift some confusion when awaking but able to be reoriented. . VSS. Pt having ongoing loose BM. Managed with PRN medicication per MD orders, PO fluids. Pt having some nausea resolved with PRN medication, ice. Pt able to ambulate with walker to bathroom. No futher needs voiced at this time. Fall precautions in place. Bed alarm on. Call light within reach. Will continue to round.  Electronically signed by Preeti Mills RN on 8/2/2021 at 3:55 AM

## 2021-08-03 LAB
ALBUMIN SERPL-MCNC: 3.1 G/DL (ref 3.4–5)
ANION GAP SERPL CALCULATED.3IONS-SCNC: 10 MMOL/L (ref 3–16)
BUN BLDV-MCNC: 20 MG/DL (ref 7–20)
CALCIUM SERPL-MCNC: 8.2 MG/DL (ref 8.3–10.6)
CHLORIDE BLD-SCNC: 105 MMOL/L (ref 99–110)
CO2: 27 MMOL/L (ref 21–32)
CREAT SERPL-MCNC: 0.6 MG/DL (ref 0.6–1.2)
GFR AFRICAN AMERICAN: >60
GFR NON-AFRICAN AMERICAN: >60
GLUCOSE BLD-MCNC: 148 MG/DL (ref 70–99)
PHOSPHORUS: 3 MG/DL (ref 2.5–4.9)
POTASSIUM SERPL-SCNC: 4.1 MMOL/L (ref 3.5–5.1)
SODIUM BLD-SCNC: 142 MMOL/L (ref 136–145)

## 2021-08-03 PROCEDURE — 1200000000 HC SEMI PRIVATE

## 2021-08-03 PROCEDURE — 6370000000 HC RX 637 (ALT 250 FOR IP): Performed by: INTERNAL MEDICINE

## 2021-08-03 PROCEDURE — 94761 N-INVAS EAR/PLS OXIMETRY MLT: CPT

## 2021-08-03 PROCEDURE — 97530 THERAPEUTIC ACTIVITIES: CPT | Performed by: PHYSICAL THERAPIST

## 2021-08-03 PROCEDURE — 97110 THERAPEUTIC EXERCISES: CPT | Performed by: PHYSICAL THERAPIST

## 2021-08-03 PROCEDURE — 97116 GAIT TRAINING THERAPY: CPT | Performed by: PHYSICAL THERAPIST

## 2021-08-03 PROCEDURE — 80069 RENAL FUNCTION PANEL: CPT

## 2021-08-03 PROCEDURE — 36415 COLL VENOUS BLD VENIPUNCTURE: CPT

## 2021-08-03 PROCEDURE — 6360000002 HC RX W HCPCS: Performed by: INTERNAL MEDICINE

## 2021-08-03 RX ORDER — PREDNISONE 10 MG/1
TABLET ORAL
Qty: 152 TABLET | Refills: 0 | DISCHARGE
Start: 2021-08-03 | End: 2021-09-25

## 2021-08-03 RX ORDER — PREDNISONE 20 MG/1
40 TABLET ORAL DAILY
Status: DISCONTINUED | OUTPATIENT
Start: 2021-08-03 | End: 2021-08-05 | Stop reason: HOSPADM

## 2021-08-03 RX ADMIN — LEVETIRACETAM 250 MG: 500 TABLET ORAL at 20:10

## 2021-08-03 RX ADMIN — BALSALAZIDE DISODIUM 2250 MG: 750 CAPSULE ORAL at 08:11

## 2021-08-03 RX ADMIN — ATORVASTATIN CALCIUM 40 MG: 40 TABLET, FILM COATED ORAL at 08:06

## 2021-08-03 RX ADMIN — BALSALAZIDE DISODIUM 2250 MG: 750 CAPSULE ORAL at 15:17

## 2021-08-03 RX ADMIN — PANTOPRAZOLE SODIUM 40 MG: 40 TABLET, DELAYED RELEASE ORAL at 05:14

## 2021-08-03 RX ADMIN — PANTOPRAZOLE SODIUM 40 MG: 40 TABLET, DELAYED RELEASE ORAL at 15:17

## 2021-08-03 RX ADMIN — CLOPIDOGREL BISULFATE 75 MG: 75 TABLET ORAL at 08:07

## 2021-08-03 RX ADMIN — AMOXICILLIN AND CLAVULANATE POTASSIUM 1 TABLET: 875; 125 TABLET, FILM COATED ORAL at 20:10

## 2021-08-03 RX ADMIN — AMOXICILLIN AND CLAVULANATE POTASSIUM 1 TABLET: 875; 125 TABLET, FILM COATED ORAL at 08:06

## 2021-08-03 RX ADMIN — BALSALAZIDE DISODIUM 2250 MG: 750 CAPSULE ORAL at 20:10

## 2021-08-03 RX ADMIN — DONEPEZIL HYDROCHLORIDE 5 MG: 5 TABLET, FILM COATED ORAL at 20:10

## 2021-08-03 RX ADMIN — LISINOPRIL 20 MG: 20 TABLET ORAL at 08:05

## 2021-08-03 RX ADMIN — METOPROLOL SUCCINATE 25 MG: 25 TABLET, FILM COATED, EXTENDED RELEASE ORAL at 08:05

## 2021-08-03 RX ADMIN — PREDNISONE 40 MG: 20 TABLET ORAL at 08:05

## 2021-08-03 RX ADMIN — ENOXAPARIN SODIUM 40 MG: 40 INJECTION SUBCUTANEOUS at 08:07

## 2021-08-03 RX ADMIN — LEVETIRACETAM 250 MG: 500 TABLET ORAL at 08:06

## 2021-08-03 ASSESSMENT — PAIN SCALES - PAIN ASSESSMENT IN ADVANCED DEMENTIA (PAINAD)
TOTALSCORE: 0
CONSOLABILITY: 0
NEGVOCALIZATION: 0
BREATHING: 0
BREATHING: 0
TOTALSCORE: 0
BODYLANGUAGE: 0
BODYLANGUAGE: 0
CONSOLABILITY: 0
FACIALEXPRESSION: 0
BODYLANGUAGE: 0
CONSOLABILITY: 0
BODYLANGUAGE: 0
FACIALEXPRESSION: 0
BREATHING: 0
TOTALSCORE: 0
NEGVOCALIZATION: 0
BREATHING: 0
CONSOLABILITY: 0
NEGVOCALIZATION: 0
TOTALSCORE: 0
NEGVOCALIZATION: 0

## 2021-08-03 ASSESSMENT — PAIN SCALES - GENERAL
PAINLEVEL_OUTOF10: 0

## 2021-08-03 NOTE — PROGRESS NOTES
PT AAO x4 per this shift. VSS. Pt having ongoing loose BM. Managed with PRN medicication per MD orders, PO fluids. Pt malena pain. Pt able to ambulate with walker to bathroom. No futher needs voiced at this time. Fall precautions in place. Bed alarm on. Call light within reach. Will continue to assess.  Electronically signed by Katlyn Vásquez RN on 8/3/2021 at 3:15 AM

## 2021-08-03 NOTE — CARE COORDINATION
Received call back from Srinivas Benoit at [facility]. They cannot accept patient as it appears she needs a locked unit. Spoke with Kanwal Lozoya at [facility] regarding referral--skilled then long term care. She will review for admission. Electronically signed by ROSIO Seals LISW, Case Management on 8/3/2021 at 1:00 PM  [facility] [id number]    2:29 PM  Received messages from [facility] (IYRST--[phone]) and [facility] (AOIEO [phone])--they both can both accept patient. Left message for son Forrest Diaz to see which facility is preferred. Electronically signed by ROSIO Seals LISW, Case Management on 8/3/2021 at 2:37 PM  [address] [id number]    4:20 PM  Left another message for patient's son, Forrest Diaz, regarding patient's dc today and snf choice.      Electronically signed by ROSIO Seals LISW, Case Management on 8/3/2021 at 4:22 PM  [address] [id number]

## 2021-08-03 NOTE — PLAN OF CARE
Problem: Falls - Risk of:  Goal: Will remain free from falls  Description: Will remain free from falls  8/2/2021 2305 by Julietta Osler, RN  Outcome: Met This Shift  Note: Patient educated on fall prevention. Call light is within reach, bed locked in lowest position, personal items within reach, and bed alarm is on. Will round on patient per unit guidelines. 8/2/2021 1036 by Jorge Marques RN  Outcome: Met This Shift  Note: No falls noted this shift. Patient ambulates with one staff assist and a walker. Bed kept in low position. Safe environment maintained. Bedside table & call light in reach. Uses call light appropriately when needing assistance. Goal: Absence of physical injury  Description: Absence of physical injury  8/2/2021 2305 by Julietta Osler, RN  Outcome: Met This Shift  8/2/2021 1036 by Jorge Marques RN  Outcome: Met This Shift  Note: Patient has been assessed for fall risk, fall precautions are in place, environment has been cleared of obstacles and reassessed during rounding, bed is in lowest position with the wheels locked, call light is within reach. ID band has been verified, appropriate transfer methods have been utilized and patient has been educated on safety, bed/chair alarms utilized      Problem: Skin Integrity:  Goal: Will show no infection signs and symptoms  Description: Will show no infection signs and symptoms  8/2/2021 2305 by Julietta Osler, RN  Outcome: Ongoing  Note: Pt assessed for infection, No signs or symptoms of surgical site noted. VVS, WBC being monitored. Reviewed information with pt and family, pt verbalized understanding     8/2/2021 1036 by Jorge Marques RN  Outcome: Met This Shift  Note: Patient has not had any signs/symptoms of infection this shift. Vitals stable. The patient has been afebrile this shift. She is getting oral antibiotics. Hands have been cleaned with soap and water or hand  upon entering and exiting the patient's room.  Gloves have been donned as required. The patient has been educated on prevention of infection as well as signs and symptoms of infection. Goal: Absence of new skin breakdown  Description: Absence of new skin breakdown  8/2/2021 2305 by Jorge Arenas RN  Outcome: Ongoing  Note: Albert score assessed. Patient able to ambulate and turn self. Repositioned patient Q2H and assessed skin. Educated patient on importance of repositioning to prevent skin issues. 8/2/2021 1036 by Khanh Portillo RN  Outcome: Met This Shift  Note: Patient has been assessed for skin breakdown using the Albert scale, patient is continent of urine and stool, no skin new break down noted, patient is able to independently move all extremities     Problem: Nutrition  Goal: Optimal nutrition therapy  8/2/2021 2305 by Jorge Arenas RN  Outcome: Ongoing  Note: Patient educated on proper nutrition. Patients intake monitored and patient assessed to make sure no assistance with eating was required. Patient encouraged to eat a well balanced diet. 8/2/2021 1036 by Khanh Portillo RN  Outcome: Met This Shift  Note: She is eating 100% of her meals but is not drinking her supplement     Problem: Pain:  Goal: Pain level will decrease  Description: Pain level will decrease  8/2/2021 2305 by Jorge Arenas RN  Outcome: Met This Shift  Note: Pt malena pain per this shift. 8/2/2021 1036 by Khanh Portillo RN  Outcome: Met This Shift  Note: Patient denies pain this shift. Goal: Control of acute pain  Description: Control of acute pain  8/2/2021 2305 by Jorge Arenas RN  Outcome: Met This Shift  Note: Pt malena pain per this shift. 8/2/2021 1036 by Khanh Portillo RN  Outcome: Met This Shift  Goal: Control of chronic pain  Description: Control of chronic pain  8/2/2021 2305 by Jorge Arenas RN  Outcome: Met This Shift  Note: Pt malena pain per this shift.    8/2/2021 1036 by Khanh Portillo RN  Outcome: Met This Shift

## 2021-08-03 NOTE — PROGRESS NOTES
Gastroenterology Progress Note    Monet Li is a 77 y.o. female patient. Active Problems:    Acute metabolic encephalopathy  Resolved Problems:    * No resolved hospital problems. *      SUBJECTIVE:  Diarrhea is improving.  5 BM's yesterday. No nausea or vomiting. No abdominal pain. Bleeding has resolved.     Current Facility-Administered Medications: loperamide (IMODIUM) capsule 2 mg, 2 mg, Oral, 4x Daily PRN  amoxicillin-clavulanate (AUGMENTIN) 875-125 MG per tablet 1 tablet, 1 tablet, Oral, 2 times per day  predniSONE (DELTASONE) tablet 20 mg, 20 mg, Oral, BID  atorvastatin (LIPITOR) tablet 40 mg, 40 mg, Oral, Daily  clopidogrel (PLAVIX) tablet 75 mg, 75 mg, Oral, Daily  donepezil (ARICEPT) tablet 5 mg, 5 mg, Oral, Nightly  levETIRAcetam (KEPPRA) tablet 250 mg, 250 mg, Oral, BID  metoprolol succinate (TOPROL XL) extended release tablet 25 mg, 25 mg, Oral, Daily  lisinopril (PRINIVIL;ZESTRIL) tablet 20 mg, 20 mg, Oral, Daily  pantoprazole (PROTONIX) tablet 40 mg, 40 mg, Oral, BID AC  sodium chloride flush 0.9 % injection 5-40 mL, 5-40 mL, Intravenous, 2 times per day  sodium chloride flush 0.9 % injection 5-40 mL, 5-40 mL, Intravenous, PRN  0.9 % sodium chloride infusion, 25 mL, Intravenous, PRN  enoxaparin (LOVENOX) injection 40 mg, 40 mg, Subcutaneous, Daily  ondansetron (ZOFRAN-ODT) disintegrating tablet 4 mg, 4 mg, Oral, Q8H PRN **OR** ondansetron (ZOFRAN) injection 4 mg, 4 mg, Intravenous, Q6H PRN  polyethylene glycol (GLYCOLAX) packet 17 g, 17 g, Oral, Daily PRN  acetaminophen (TYLENOL) tablet 650 mg, 650 mg, Oral, Q6H PRN **OR** acetaminophen (TYLENOL) suppository 650 mg, 650 mg, Rectal, Q6H PRN  magnesium sulfate 2000 mg in 50 mL IVPB premix, 2,000 mg, Intravenous, PRN  balsalazide (COLAZAL) capsule 2,250 mg, 2,250 mg, Oral, TID    Physical    VITALS:  BP (!) 166/82   Pulse 60   Temp 98.6 °F (37 °C) (Oral)   Resp 16   Ht 4' 10\" (1.473 m)   Wt 162 lb 11.2 oz (73.8 kg)   SpO2 95%   BMI Prednisone 40mg daily. 2.  Continue balsalazide  3. On discharge would stay on 40mg daily for 28 days then decrease to 30mg daily for 5 days, 20mg daily for 5 days, 15mg daily for 5 days, 10mg daily for 5 days, and 5mg daily for 5 days, then stop. Will then try to maintain on balsalazide. With placement, medication compliance will be easier to monitor. 4.  Outpatient follow-up with Dr. Efrain Akhtar in 2-3 weeks. I notified him of admission and put in discharge instructions the follow-up. OK for discharge from my standpoint. Will sign off. Please call with questions. Thank you for allowing me to participate in the care of your patient. Please feel free to contact me with any concerns.   [address], MD

## 2021-08-03 NOTE — CARE COORDINATION
Spoke with Sin Melvin at [facility] will review patient for admit. She is aware patient would be medicaid pending.    Electronically signed by ROSIO Calderon, MIGUEL, Case Management on 8/3/2021 at 9:02 AM  [facility], [place] [phone]

## 2021-08-03 NOTE — PROGRESS NOTES
Patient is alert & oriented x4, x1 assist with walker, 2/4 bed rails up, bed in lowest position, fall precautions in place, call light within reach. Morning medications given without complications. Will cont to monitor and reassess.  Electronically signed by Suzanne Reed RN on 8/3/2021 at 8:14 AM

## 2021-08-03 NOTE — PLAN OF CARE
Problem: Falls - Risk of:  Goal: Will remain free from falls  Description: Will remain free from falls  8/3/2021 1029 by Mo Singh RN  Outcome: Ongoing  Note: Fall risk assessment completed. Fall precautions in place. Bed in lowest position, wheels locked, bed/chair exit alarm in place, call light within reach, and non skid footwear on. Walkway free of clutter. Pt alert and oriented and able to make needs known. Pt educated to use call light when needing to get up, and pt utilizes call light to make needs known. Will continue to monitor. Problem: Falls - Risk of:  Goal: Absence of physical injury  Description: Absence of physical injury  8/3/2021 1029 by Mo Singh RN  Outcome: Ongoing  Note: Pt is free of injury. No injury noted. Fall precautions in place. Call light within reach. Will monitor. Problem: Skin Integrity:  Goal: Will show no infection signs and symptoms  Description: Will show no infection signs and symptoms  8/3/2021 1029 by Mo Singh RN  Outcome: Ongoing  Note: Pt is free of signs and symptoms of infection. Incision and dressing are clean, dry and intact. Vital signs stable. Will monitor. Problem: Skin Integrity:  Goal: Absence of new skin breakdown  Description: Absence of new skin breakdown  8/3/2021 1029 by Mo Singh RN  Outcome: Ongoing  Note: Pt assessed for skin break down. Skin was warm and dry to touch. Pt can regulate head of bed without assistance. Pt turns self independently. Will continue to monitor and assess. Problem: Nutrition  Goal: Optimal nutrition therapy  8/3/2021 1029 by Mo Singh RN  Outcome: Ongoing  Note: Patient's nutrition is improving, patient had % of breakfast. Will cont to monitor and reassess.

## 2021-08-04 LAB
ALBUMIN SERPL-MCNC: 3.1 G/DL (ref 3.4–5)
ANION GAP SERPL CALCULATED.3IONS-SCNC: 14 MMOL/L (ref 3–16)
BUN BLDV-MCNC: 16 MG/DL (ref 7–20)
CALCIUM SERPL-MCNC: 8.1 MG/DL (ref 8.3–10.6)
CHLORIDE BLD-SCNC: 107 MMOL/L (ref 99–110)
CO2: 25 MMOL/L (ref 21–32)
CREAT SERPL-MCNC: <0.5 MG/DL (ref 0.6–1.2)
GFR AFRICAN AMERICAN: >60
GFR NON-AFRICAN AMERICAN: >60
GLUCOSE BLD-MCNC: 98 MG/DL (ref 70–99)
PHOSPHORUS: 2.8 MG/DL (ref 2.5–4.9)
POTASSIUM SERPL-SCNC: 3.4 MMOL/L (ref 3.5–5.1)
SODIUM BLD-SCNC: 146 MMOL/L (ref 136–145)

## 2021-08-04 PROCEDURE — 94760 N-INVAS EAR/PLS OXIMETRY 1: CPT

## 2021-08-04 PROCEDURE — 36415 COLL VENOUS BLD VENIPUNCTURE: CPT

## 2021-08-04 PROCEDURE — 97530 THERAPEUTIC ACTIVITIES: CPT

## 2021-08-04 PROCEDURE — 97535 SELF CARE MNGMENT TRAINING: CPT

## 2021-08-04 PROCEDURE — 97110 THERAPEUTIC EXERCISES: CPT

## 2021-08-04 PROCEDURE — 80069 RENAL FUNCTION PANEL: CPT

## 2021-08-04 PROCEDURE — 6360000002 HC RX W HCPCS: Performed by: INTERNAL MEDICINE

## 2021-08-04 PROCEDURE — 1200000000 HC SEMI PRIVATE

## 2021-08-04 PROCEDURE — 97116 GAIT TRAINING THERAPY: CPT

## 2021-08-04 PROCEDURE — 6370000000 HC RX 637 (ALT 250 FOR IP): Performed by: INTERNAL MEDICINE

## 2021-08-04 RX ADMIN — ENOXAPARIN SODIUM 40 MG: 40 INJECTION SUBCUTANEOUS at 07:54

## 2021-08-04 RX ADMIN — BALSALAZIDE DISODIUM 2250 MG: 750 CAPSULE ORAL at 07:53

## 2021-08-04 RX ADMIN — BALSALAZIDE DISODIUM 2250 MG: 750 CAPSULE ORAL at 14:17

## 2021-08-04 RX ADMIN — BALSALAZIDE DISODIUM 2250 MG: 750 CAPSULE ORAL at 20:24

## 2021-08-04 RX ADMIN — CLOPIDOGREL BISULFATE 75 MG: 75 TABLET ORAL at 07:54

## 2021-08-04 RX ADMIN — LEVETIRACETAM 250 MG: 500 TABLET ORAL at 20:23

## 2021-08-04 RX ADMIN — PANTOPRAZOLE SODIUM 40 MG: 40 TABLET, DELAYED RELEASE ORAL at 17:00

## 2021-08-04 RX ADMIN — LOPERAMIDE HYDROCHLORIDE 2 MG: 2 CAPSULE ORAL at 20:23

## 2021-08-04 RX ADMIN — AMOXICILLIN AND CLAVULANATE POTASSIUM 1 TABLET: 875; 125 TABLET, FILM COATED ORAL at 07:54

## 2021-08-04 RX ADMIN — AMOXICILLIN AND CLAVULANATE POTASSIUM 1 TABLET: 875; 125 TABLET, FILM COATED ORAL at 20:22

## 2021-08-04 RX ADMIN — LISINOPRIL 20 MG: 20 TABLET ORAL at 07:54

## 2021-08-04 RX ADMIN — PREDNISONE 40 MG: 20 TABLET ORAL at 07:53

## 2021-08-04 RX ADMIN — ATORVASTATIN CALCIUM 40 MG: 40 TABLET, FILM COATED ORAL at 07:54

## 2021-08-04 RX ADMIN — METOPROLOL SUCCINATE 25 MG: 25 TABLET, FILM COATED, EXTENDED RELEASE ORAL at 07:54

## 2021-08-04 RX ADMIN — PANTOPRAZOLE SODIUM 40 MG: 40 TABLET, DELAYED RELEASE ORAL at 05:55

## 2021-08-04 RX ADMIN — LEVETIRACETAM 250 MG: 500 TABLET ORAL at 07:54

## 2021-08-04 RX ADMIN — DONEPEZIL HYDROCHLORIDE 5 MG: 5 TABLET, FILM COATED ORAL at 20:23

## 2021-08-04 ASSESSMENT — PAIN SCALES - GENERAL
PAINLEVEL_OUTOF10: 0

## 2021-08-04 NOTE — PROGRESS NOTES
Occupational Therapy  Facility/Department: [address] ORTHOPEDICS  Daily Treatment Note  NAME: Wei Lofton  : 1945  MRN: [id number]    Date of Service: 2021    Discharge Recommendations:  3-5 sessions per week     Wei Lofton scored a 17/24 on the AM-PAC ADL Inpatient form. Current research shows that an AM-PAC score of 17 or less is typically not associated with a discharge to the patient's home setting. Based on the patient's AM-PAC score and their current ADL deficits, it is recommended that the patient have 3-5 sessions per week of Occupational Therapy at d/c to increase the patient's independence. Please see assessment section for further patient specific details. If patient discharges prior to next session this note will serve as a discharge summary. Please see below for the latest assessment towards goals. Assessment: Discussed with OTR am pac score is 17 which indicates need for continued skilled OT to increase [place] and decrease caregiver burden. Patient able to complete functional mobility with RW with CGA, slow steady gait with no overt LOB noted, cues for walker management and safety. CGA for sit<>stand from recliner chair to RW to recliner chair with cures for hand placement. Mod A for overall for grooming tasks. Patient is unable to return home at this time due to assist level requirments. Cont with POC. Patient Diagnosis(es): The primary encounter diagnosis was Diarrhea, unspecified type. Diagnoses of Abdominal pain, epigastric, Dehydration, and Hypokalemia were also pertinent to this visit. has a past medical history of Acute MI ([address] Eczema, [place] (hard of hearing), Hypercholesteremia, Hypertension, Pacemaker, Ulcerative colitis, and Wears hearing aid. has a past surgical history that includes Hysterectomy (); Pacemaker insertion; Coronary angioplasty with stent; Ankle fracture surgery (11); rhinoplasty;  Upper gastrointestinal endoscopy (N/A, 1/20/2020); and Colonoscopy (N/A, 1/20/2020). Restrictions  Restrictions/Precautions  Restrictions/Precautions: Fall Risk  Subjective   General  Chart Reviewed: Yes  Patient assessed for rehabilitation services?: Yes  Additional Pertinent Hx: Per Arliss Hodgkins, MD: Pt is \"66 y.o. female who presents to [facility] with altered mental status. Patient has a history of dementia, ulcerative colitis, hypertension, hyperlipidemia who presents to the emergency department with altered mental status for a few days. Patient lives with her son after recent admission to the hospital followed by a short stay at a SNF. Patient at that point had diarrhea, JUNI, urinary tract infection along with acute metabolic encephalopathy. Patient arrived to the emergency department covered in feces along with her son. Patient's labs on admission showed a potassium of 2.5, magnesium of 1.5 and a white count of 13. Urinalysis was concerning for urinary tract infection and a CT of her abdomen pelvis showed pancolitis. \"  Response to previous treatment: Patient with no complaints from previous session  Family / Caregiver Present: No  Referring Practitioner: Mary Ram MD  Diagnosis: Acute metabolic encephalopathy  Subjective  Subjective: Patient seated in recliner chair upon arrival to room. Patient agreeable to therapy. Patient very [facility]      Orientation  Orientation  Overall Orientation Status: Impaired  Orientation Level: Oriented to person;Oriented to place; Disoriented to situation  Objective    ADL  Grooming:  Moderate assistance (Patient able to wash face and hands, Mod A to wash and brush hair)        Balance  Sitting Balance: Supervision  Standing Balance: Contact guard assistance  Standing Balance  Activity: static standing for ADL tasks  Functional Mobility  Functional - Mobility Device: Rolling Walker  Activity: To/from bathroom  Assist Level: Contact guard assistance  Functional Mobility Comments: Functional mobility with RW with CGA, slow steady gait with no overt LOB noted, cues for walker management and safety  Bed mobility  Supine to Sit: Unable to assess  Sit to Supine: Unable to assess  Comment: up in chair at start and end of session  Transfers  Sit to stand: Contact guard assistance  Stand to sit: Contact guard assistance  Transfer Comments: CGA for sit<>stand from recliner chair to RW to recliner chair with cues for hand placement      Cognition  Overall Cognitive Status: Exceptions  Arousal/Alertness: Appropriate responses to stimuli  Following Commands: Follows one step commands with repetition  Attention Span: Attends with cues to redirect  Safety Judgement: Decreased awareness of need for safety;Decreased awareness of need for assistance  Insights: Not aware of deficits  Initiation: Requires cues for some  Sequencing: Requires cues for some  Cognition Comment: hard of hearing, needs instructions/questions repeated frequently     Assessment   Performance deficits / Impairments: Decreased functional mobility ; Decreased strength;Decreased endurance;Decreased ADL status; Decreased safe awareness;Decreased cognition;Decreased balance  Assessment: Discussed with OTR am pac score is 17 which indicates need for continued skilled OT to increase [place] and decrease caregiver burden. Patient able to complete functional mobility with RW with CGA, slow steady gait with no overt LOB noted, cues for walker management and safety. CGA for sit<>stand from recliner chair to RW to recliner chair with cures for hand placement. Mod A for overall for grooming tasks. Patient is unable to return home at this time due to assist level requirments. Cont with POC. OT Education: OT Role;Transfer Training;Plan of Care;Precautions; ADL Adaptive Strategies  REQUIRES OT FOLLOW UP: Yes  Activity Tolerance  Activity Tolerance: Patient Tolerated treatment well  Safety Devices  Safety Devices in place: Yes  Type of devices: Nurse notified;Gait belt;Call light within reach; Chair alarm in place; Left in chair          Plan   Plan  Times per week: 3-5  Current Treatment Recommendations: Strengthening, Endurance Training, Balance Training, Functional Mobility Training, Safety Education & Training, Equipment Evaluation, Education, & procurement, Patient/Caregiver Education & Training, Self-Care / ADL    AM-PAC Score        AM-PAC Inpatient Daily Activity Raw Score: 17 (08/04/21 1046)  AM-PAC Inpatient ADL T-Scale Score : 37.26 (08/04/21 1046)  ADL Inpatient CMS 0-100% Score: 50.11 (08/04/21 1046)  ADL Inpatient CMS G-Code Modifier : CK (08/04/21 1046)    Goals  Short term goals  Time Frame for Short term goals: prior to d/c: all goals ongoing  Short term goal 1: Pt will complete functional mobility and transfers with SPV  Short term goal 2: Pt will complete toileting with SPV  Short term goal 3: Pt will complete dressing with SPV  Short term goal 4: Pt will complete bathing with SPV  Patient Goals   Patient goals : \"to go home\"       Therapy Time   Individual Concurrent Group Co-treatment   Time In 1010         Time Out 1055         Minutes 45                 Electronically signed by Autumn Hardin [id number] on 8/4/2021 at 10:57 AM

## 2021-08-04 NOTE — PROGRESS NOTES
Physical Therapy  Facility/Department: [facility]  Daily Treatment Note  NAME: Ezekiel Sacks  : 1945  MRN: [id number]    Date of Service: 2021  Discharge Recommendations:  Patient would benefit from continued therapy after discharge, 3-5 sessions per week   PT Equipment Recommendations  Other: defer to next level of care    Assessment   Body structures, Functions, Activity limitations: Decreased functional mobility ; Decreased balance;Decreased safe awareness;Decreased endurance  Assessment: The patient is a 77 y.o. female who presents to [facility] with altered mental status. Patient has a history of dementia, ulcerative colitis, hypertension, hyperlipidemia who presents to the emergency department on 21 with altered mental status for a few days. Patient lives with her son after recent admission to the hospital followed by a short stay at a SNF. Prior to admission, pt living with son in house - reports she was independent with all ADLs, IADLs, and ambulation without device, however she reports she falls 5 times per week and is unable to get self up--pt again reported this to PT this date. Again today, pt was able to perform bed mobility with supervision, sit<>stand with CGA/SBA with many cues for safety and hand placement, and only ambulated x 40-50 ft in room with CGA/SBA with wheeled walker and issues with safety concerns especially in tighter areas. Patient's mentation and lack of awareness for safety likely contributing to recurrent fall. The pt will need 24hr assist and would benefit from continued skilled therapy 3-5x/week. Ezekiel Sacks scored a 17/24 on the AM-PAC short mobility form. Current research shows that an AM-PAC score of 17 or less is typically not associated with a discharge to the patient's home setting.  Based on the patient's AM-PAC score and their current functional mobility deficits, it is recommended that the patient have 3-5 sessions per week of Physical Therapy at d/c to increase the patient's independence. Please see assessment section for further patient specific details. If patient discharges prior to next session this note will serve as a discharge summary. Please see below for the latest assessment towards goals. Treatment Diagnosis: impaired dynamic balance  PT Education: PT Role;Plan of Care;Transfer Training;General Safety; Functional Mobility Training;Gait Training  REQUIRES PT FOLLOW UP: Yes  Activity Tolerance  Activity Tolerance: Patient Tolerated treatment well;Patient limited by endurance; Patient limited by fatigue  Activity Tolerance: very hard of hearing and needed instructions repeated several times, noted safety concerns with patient being alone at home     Patient Diagnosis(es): The primary encounter diagnosis was Diarrhea, unspecified type. Diagnoses of Abdominal pain, epigastric, Dehydration, and Hypokalemia were also pertinent to this visit. has a past medical history of Acute MI ([address] Eczema, [place] (hard of hearing), Hypercholesteremia, Hypertension, Pacemaker, Ulcerative colitis, and Wears hearing aid. has a past surgical history that includes Hysterectomy (1996); Pacemaker insertion; Coronary angioplasty with stent; Ankle fracture surgery (7/26/11); rhinoplasty; Upper gastrointestinal endoscopy (N/A, 1/20/2020); and Colonoscopy (N/A, 1/20/2020). Restrictions  Restrictions/Precautions  Restrictions/Precautions: Fall Risk  Subjective   General  Chart Reviewed: Yes  Additional Pertinent Hx: The patient is a 77 y.o. female who presents to [facility] with altered mental status. Patient has a history of dementia, ulcerative colitis, hypertension, hyperlipidemia who presents to the emergency department on 7/28/21 with altered mental status for a few days. Patient lives with her son after recent admission to the hospital followed by a short stay at a SNF.   Response To Previous Treatment: Patient reporting fatigue but able to participate. Family / Caregiver Present: No  Referring Practitioner: Mallory Mike MD  Subjective  Subjective: Pt finishing breakfast in bed when PT arrived. Pt very [place] so increased time was needed to complete tasks at hand. Pt denied c/o pain or other. Pt repetitive that she falls at home, and cannot get up off the floor. Pt reported again too, that her son cannot help her off the floor, as he has had a stroke and cannot use his L side to help her up. Attempted to educate pt on importance of ongong therapy for strengthening and other needs, but difficulty due to [facility] Orientation  Orientation  Overall Orientation Status: Impaired  Orientation Level: Oriented to place;Oriented to situation;Disoriented to time;Oriented to person (repetitive about her home situation this date.)     Objective   Bed mobility  Supine to Sit: Supervision;Stand by assistance (with HOB elevated.)  Sit to Supine: Unable to assess (nt--pt up in recliner at end of session.)  Transfers  Sit to Stand: Contact guard assistance;Stand by assistance (with MAX cues for hand placement/safety--pt does not always follow. Pt tends to pull up onto device to obtain stance. [place] may by limiting factor.)  Stand to sit: Contact guard assistance;Stand by assistance  Comment: Trasnfers several times from eob, recliner, commode surfaces this date. Ambulation  Ambulation?: Yes  Ambulation 1  Surface: level tile  Device: Rolling Walker  Assistance: Stand by assistance;Contact guard assistance  Quality of Gait: fairly steady, with several turns in room only, as pt reported some fatigue today, as well as need to use commode during session. Pt does require many cues for RW safety, yanelis with turns/backing up to sit down.   Gait Deviations: Decreased step length;Decreased step height  Distance: x 40-50 ft in room only this date, toileting needs limiting  Stairs/Curb  Stairs?: No     Balance  Sitting - Static: Good  Sitting - Dynamic: Good  Standing - Static: Good;-  Standing - Dynamic: Good;-  Comments: S sitting eob, SB/CGA stance and amb with RW suppor. Exercises  Hip Flexion: x 10, BLEs  Knee Long Arc Quad: x 10, BLEs  Ankle Pumps: x 10, BLEs  Comments: seated B LE ther ex as above. Comment: Toilet transfers with SBA using grab bar, able to manage undergarment down and up and wash hands with SBA for standing balance. .  Pt with reported fatigue after increased time in bathroom this date. AM-PAC Score  AM-PAC Inpatient Mobility Raw Score : 17 (08/04/21 0848)  AM-PAC Inpatient T-Scale Score : 42.13 (08/04/21 0848)  Mobility Inpatient CMS 0-100% Score: 50.57 (08/04/21 0848)  Mobility Inpatient CMS G-Code Modifier : CK (08/04/21 0848)     Goals  Short term goals  Time Frame for Short term goals: by acute discharge--goals ongoing and updated. Short term goal 1: sit<>stand independently  Short term goal 2: ambulate > 150' independently  Long term goals  Time Frame for Long term goals : tbd at next level of care  Patient Goals   Patient goals : none stated    Plan    Plan  Times per week: 3-5x/week while on acute, recommend 3-5  Current Treatment Recommendations: Balance Training, Functional Mobility Training, Gait Training, Neuromuscular Re-education, Safety Education & Training, Patient/Caregiver Education & Training  Safety Devices  Type of devices:  All fall risk precautions in place, Call light within reach, Gait belt, Patient at risk for falls, Left in chair, Chair alarm in place, Nurse notified   Therapy Time   Individual Concurrent Group Co-treatment   Time In 0800         Time Out 0845         Minutes 45          1 ex, 1 gt, 1 act charges   Gonsalo Dumas PT Electronically signed by Gonsalo Dumas PT on 8/4/2021 at 8:49 AM

## 2021-08-04 NOTE — DISCHARGE SUMMARY
Hospital Medicine Discharge Summary    Patient ID: Michelle Guzman      Patient's PCP: Ita Chester Date: 7/28/2021     Discharge Date:   8/4/21    Admitting Physician: Tad Mcintosh MD     Discharge Physician: Tad Mcintosh MD     Discharge Diagnoses: Active Hospital Problems    Diagnosis Date Noted    Acute metabolic encephalopathy [B04.98] 11/16/2020       The patient was seen and examined on day of discharge and this discharge summary is in conjunction with any daily progress note from day of discharge. Hospital Course: The patient is a 68 y. o. female who presents to [facility] with altered mental status.  Patient has a history of dementia, ulcerative colitis, hypertension, hyperlipidemia who presents to the emergency department with altered mental status for a few days.  Patient lives with her son after recent admission to the hospital followed by a short stay at a SNF.  Patient at that point had diarrhea, JUNI, urinary tract infection along with acute metabolic encephalopathy.  Patient arrived to the emergency department covered in feces along with her son.  Patient's labs on admission showed a potassium of 2.5, magnesium of 1.5 and a white count of 13.  Urinalysis was concerning for urinary tract infection and a CT of her abdomen pelvis showed pancolitis.  C. difficile along with GI pathogens ordered.  Patient started on IV fluids along with broad-spectrum IV antibiotics.  Patient also started on prednisone low-dose for possible ulcerative colitis flareup. [facility] and she will be admitted to the hospital for further care and intervention. C. difficile and GI pathogen panels were both negative so Imodium was started and her diarrhea started to slow down. Patient was started on steroids for her Crohn's flare and will discharge on a steroid taper. Her urinary tract infection was treated with Rocephin at first before transitioning to Augmentin.   She lost IV access after she pulled out an IV and because it is Enterococcus faecium we are only able to use ampicillin or vancomycin. Patient will discharge on a few more days of Augmentin. Patient is stable for discharge to a SNF and has been since August 1 but we are awaiting precertification from insurance. They are avoidable days. Diarrhea  C. difficile neg, GI pathogens negative  Imodium started, diarrhea slowing down  CT with diffuse colitis  Possible ulcerative colitis flare  Solumedrol 20mg q8, changed to prednisone 20 mg every 8 due to no IV access, steroid taper at discharge 40mg daily for 28 days then decrease to 30mg daily for 5 days, 20mg daily for 5 days, 15mg daily for 5 days, 10mg daily for 5 days, and 5mg daily for 5 days, then stop     Urinary tract infection  Await culture and sensitivity: enteroccus faecium  Continue Rocephin, transition to oral Augmentin  Finished 5 days of total therapy     Acute metabolic encephalopathy  Likely secondary to above  Treat underlying conditions  resolving     Hypomagnesemia  Replete and recheck  resolved     Hypokalemia  Replete and recheck    Exam:     BP (!) 167/81   Pulse 71   Temp 97.7 °F (36.5 °C) (Oral)   Resp 14   Ht 4' 10\" (1.473 m)   Wt 162 lb 4.1 oz (73.6 kg)   SpO2 95%   BMI 33.91 kg/m²     General appearance: No apparent distress, appears stated age and cooperative. HEENT: Pupils equal, round, and reactive to light. Conjunctivae/corneas clear, [place]  Neck: Supple, with full range of motion. No jugular venous distention. Trachea midline. Respiratory:  Normal respiratory effort. Clear to auscultation, bilaterally without Rales/Wheezes/Rhonchi. Cardiovascular: Regular rate and rhythm with normal S1/S2 without murmurs, rubs or gallops. Abdomen: Soft, non-tender, non-distended with normal bowel sounds. Musculoskeletal: No clubbing, cyanosis or edema bilaterally. Full range of motion without deformity.   Skin: Skin color, texture, turgor normal.  No rashes or lesions. Neurologic:  Neurovascularly intact without any focal sensory/motor deficits. Cranial nerves: II-XII intact, grossly non-focal.  Psychiatric: Alert and oriented, thought content appropriate, normal insight      Consults:     IP CONSULT TO SOCIAL WORK  IP CONSULT TO HOSPITALIST  IP CONSULT TO GI  IP CONSULT TO DIETITIAN    Significant Diagnostic Studies:     CT ABDOMEN PELVIS W IV CONTRAST Additional Contrast? None   Final Result   Diffuse colitis. No evidence of obstruction or perforation. Large hiatal hernia. Disposition:  SNF     Condition at Discharge: Stable    Discharge Instructions/Follow-up:  PCP    Code Status:  Full Code     Activity: activity as tolerated    Diet: regular diet      Labs: For convenience and continuity at follow-up the following most recent labs are provided:      CBC:    Lab Results   Component Value Date    WBC 16.2 08/01/2021    HGB 9.7 08/01/2021    HCT 30.7 08/01/2021     08/01/2021       Renal:    Lab Results   Component Value Date     08/04/2021    K 3.4 08/04/2021    K 3.4 07/29/2021     08/04/2021    CO2 25 08/04/2021    BUN 16 08/04/2021    CREATININE <0.5 08/04/2021    CALCIUM 8.1 08/04/2021    PHOS 2.8 08/04/2021       Discharge Medications:     Current Discharge Medication List           Details   predniSONE (DELTASONE) 10 MG tablet Take 4 tablets by mouth daily for 28 days, THEN 3 tablets daily for 5 days, THEN 2 tablets daily for 5 days, THEN 1.5 tablets daily for 5 days, THEN 1 tablet daily for 5 days, THEN 0.5 tablets daily for 5 days.   Qty: 152 tablet, Refills: 0      loperamide (IMODIUM) 2 MG capsule Take 1 capsule by mouth 4 times daily as needed for Diarrhea  Qty: 40 capsule, Refills: 0              Details   balsalazide (COLAZAL) 750 MG capsule Take 2,250 mg by mouth 3 times daily      donepezil (ARICEPT) 5 MG tablet Take 1 tablet by mouth nightly  Qty: 30 tablet, Refills: 3      vitamin B-12 1000 MCG tablet Take 1 tablet

## 2021-08-04 NOTE — CARE COORDINATION
[facility] now reports patient is out of network. [facility] [address] and [facility] can accept. Discussed with son who prefers [facility] [address]. They will initiate precert immediately.   Electronically signed by Adrien Erazo on 8/4/2021 at 2:12 PM

## 2021-08-05 VITALS
WEIGHT: 161.6 LBS | HEIGHT: 58 IN | HEART RATE: 53 BPM | SYSTOLIC BLOOD PRESSURE: 173 MMHG | OXYGEN SATURATION: 96 % | DIASTOLIC BLOOD PRESSURE: 83 MMHG | TEMPERATURE: 98.5 F | BODY MASS INDEX: 33.92 KG/M2 | RESPIRATION RATE: 15 BRPM

## 2021-08-05 LAB
ALBUMIN SERPL-MCNC: 2.9 G/DL (ref 3.4–5)
ANION GAP SERPL CALCULATED.3IONS-SCNC: 7 MMOL/L (ref 3–16)
BUN BLDV-MCNC: 19 MG/DL (ref 7–20)
CALCIUM SERPL-MCNC: 7.9 MG/DL (ref 8.3–10.6)
CHLORIDE BLD-SCNC: 104 MMOL/L (ref 99–110)
CO2: 30 MMOL/L (ref 21–32)
CREAT SERPL-MCNC: 0.6 MG/DL (ref 0.6–1.2)
GFR AFRICAN AMERICAN: >60
GFR NON-AFRICAN AMERICAN: >60
GLUCOSE BLD-MCNC: 104 MG/DL (ref 70–99)
PHOSPHORUS: 2.9 MG/DL (ref 2.5–4.9)
POTASSIUM SERPL-SCNC: 3.2 MMOL/L (ref 3.5–5.1)
SODIUM BLD-SCNC: 141 MMOL/L (ref 136–145)

## 2021-08-05 PROCEDURE — 80069 RENAL FUNCTION PANEL: CPT

## 2021-08-05 PROCEDURE — 94761 N-INVAS EAR/PLS OXIMETRY MLT: CPT

## 2021-08-05 PROCEDURE — 6370000000 HC RX 637 (ALT 250 FOR IP): Performed by: INTERNAL MEDICINE

## 2021-08-05 PROCEDURE — 6360000002 HC RX W HCPCS: Performed by: INTERNAL MEDICINE

## 2021-08-05 PROCEDURE — 36415 COLL VENOUS BLD VENIPUNCTURE: CPT

## 2021-08-05 RX ADMIN — AMOXICILLIN AND CLAVULANATE POTASSIUM 1 TABLET: 875; 125 TABLET, FILM COATED ORAL at 09:49

## 2021-08-05 RX ADMIN — BALSALAZIDE DISODIUM 2250 MG: 750 CAPSULE ORAL at 13:31

## 2021-08-05 RX ADMIN — LEVETIRACETAM 250 MG: 500 TABLET ORAL at 09:49

## 2021-08-05 RX ADMIN — CLOPIDOGREL BISULFATE 75 MG: 75 TABLET ORAL at 09:50

## 2021-08-05 RX ADMIN — PREDNISONE 40 MG: 20 TABLET ORAL at 09:50

## 2021-08-05 RX ADMIN — ATORVASTATIN CALCIUM 40 MG: 40 TABLET, FILM COATED ORAL at 09:49

## 2021-08-05 RX ADMIN — METOPROLOL SUCCINATE 25 MG: 25 TABLET, FILM COATED, EXTENDED RELEASE ORAL at 09:50

## 2021-08-05 RX ADMIN — LISINOPRIL 20 MG: 20 TABLET ORAL at 09:49

## 2021-08-05 RX ADMIN — BALSALAZIDE DISODIUM 2250 MG: 750 CAPSULE ORAL at 09:49

## 2021-08-05 RX ADMIN — PANTOPRAZOLE SODIUM 40 MG: 40 TABLET, DELAYED RELEASE ORAL at 05:35

## 2021-08-05 RX ADMIN — ENOXAPARIN SODIUM 40 MG: 40 INJECTION SUBCUTANEOUS at 09:49

## 2021-08-05 NOTE — PROGRESS NOTES
Patient is resting in bed. Alert and oriented X3. Patient is forgetful. Denies pain at this time. Patient continues with diarrhea, Prn medication given (see MAR). Assessment compete. All patient needs are met at this time. Fall precautions are in place. Call light is in reach. Will continue to monitor.    Electronically signed by Ren Ayala RN on 8/4/2021 at 9:45 PM

## 2021-08-05 NOTE — PROGRESS NOTES
Report called to [facility] at [facility] FOR [facility] AT [facility].

## 2021-08-05 NOTE — CARE COORDINATION
After expediting brittany received call from Leila Dakins ([phone]) at [facility] with approval. #[id number]  Today thru Monday 8/9. Arranged for transfer to [facility] AT [facility] today at 2pm via [address]. Jazmin completed. Son aware an in agreement with plan.   Report to [phone]  Electronically signed by Martell Loo on 8/5/2021 at 1:02 PM

## 2021-08-05 NOTE — PROGRESS NOTES
Data- discharge order received, pt or Son Swathi Palomino  (appointed legal authority) verbalized agreement to discharge, disposition to [facility] , [facility] Philomena Andres reviewed and signed by physician. Action- AVS prepared, PATIENCE completed/ reported faxed by case management/. Discharge instruction summary: Diet- regular , Activity- stand-by assist with front wheel walker , immunizations reviewed and got first Covid 19 vaccine , medications prescriptions to be filled at receiving facility except for the controlled prescriptions to be sent: none , Transfer code status: Full Code, LDAs to remain with discharge: none . DME used: none . Response- Bedside RN to call report to receiving facility. Pt belongings gathered, peripheral IV and cardiac monitoring removed. Disposition to Discharged via cart/stretcher and via ambulance to skilled nursing by EMS transportation, no complications reported.

## 2021-08-05 NOTE — PLAN OF CARE
Problem: SAFETY  Goal: Free from accidental physical injury  Outcome: Ongoing  Note: Pt is free of injury. No injury noted. Fall precautions in place. Call light within reach. Will monitor. Goal: Free from intentional harm  Outcome: Ongoing  Note: Pt is free of intentional harm. No intentions noted. Will monitor. Problem: DAILY CARE  Goal: Daily care needs are met  Outcome: Ongoing  Note: Patients daily care needs will be met this shift.       Electronically signed by Stef Collins RN on 8/4/2021 at 9:44 PM
